# Patient Record
Sex: FEMALE | Race: WHITE | URBAN - METROPOLITAN AREA
[De-identification: names, ages, dates, MRNs, and addresses within clinical notes are randomized per-mention and may not be internally consistent; named-entity substitution may affect disease eponyms.]

---

## 2019-11-14 ENCOUNTER — EMERGENCY (EMERGENCY)
Facility: HOSPITAL | Age: 24
LOS: 0 days | Discharge: HOME | End: 2019-11-14
Attending: EMERGENCY MEDICINE | Admitting: EMERGENCY MEDICINE
Payer: COMMERCIAL

## 2019-11-14 VITALS
DIASTOLIC BLOOD PRESSURE: 57 MMHG | RESPIRATION RATE: 19 BRPM | HEART RATE: 87 BPM | OXYGEN SATURATION: 98 % | TEMPERATURE: 97 F | SYSTOLIC BLOOD PRESSURE: 121 MMHG

## 2019-11-14 DIAGNOSIS — R55 SYNCOPE AND COLLAPSE: ICD-10-CM

## 2019-11-14 DIAGNOSIS — R06.02 SHORTNESS OF BREATH: ICD-10-CM

## 2019-11-14 DIAGNOSIS — N93.9 ABNORMAL UTERINE AND VAGINAL BLEEDING, UNSPECIFIED: ICD-10-CM

## 2019-11-14 LAB
ALBUMIN SERPL ELPH-MCNC: 5 G/DL — SIGNIFICANT CHANGE UP (ref 3.5–5.2)
ALP SERPL-CCNC: 53 U/L — SIGNIFICANT CHANGE UP (ref 30–115)
ALT FLD-CCNC: 25 U/L — SIGNIFICANT CHANGE UP (ref 0–41)
ANION GAP SERPL CALC-SCNC: 16 MMOL/L — HIGH (ref 7–14)
APPEARANCE UR: CLEAR — SIGNIFICANT CHANGE UP
AST SERPL-CCNC: 27 U/L — SIGNIFICANT CHANGE UP (ref 0–41)
BACTERIA # UR AUTO: NEGATIVE — SIGNIFICANT CHANGE UP
BASOPHILS # BLD AUTO: 0.05 K/UL — SIGNIFICANT CHANGE UP (ref 0–0.2)
BASOPHILS NFR BLD AUTO: 0.5 % — SIGNIFICANT CHANGE UP (ref 0–1)
BILIRUB SERPL-MCNC: 0.7 MG/DL — SIGNIFICANT CHANGE UP (ref 0.2–1.2)
BILIRUB UR-MCNC: NEGATIVE — SIGNIFICANT CHANGE UP
BUN SERPL-MCNC: 11 MG/DL — SIGNIFICANT CHANGE UP (ref 10–20)
CALCIUM SERPL-MCNC: 9.9 MG/DL — SIGNIFICANT CHANGE UP (ref 8.5–10.1)
CHLORIDE SERPL-SCNC: 106 MMOL/L — SIGNIFICANT CHANGE UP (ref 98–110)
CO2 SERPL-SCNC: 22 MMOL/L — SIGNIFICANT CHANGE UP (ref 17–32)
COLOR SPEC: SIGNIFICANT CHANGE UP
CREAT SERPL-MCNC: 0.9 MG/DL — SIGNIFICANT CHANGE UP (ref 0.7–1.5)
DIFF PNL FLD: ABNORMAL
EOSINOPHIL # BLD AUTO: 0.04 K/UL — SIGNIFICANT CHANGE UP (ref 0–0.7)
EOSINOPHIL NFR BLD AUTO: 0.4 % — SIGNIFICANT CHANGE UP (ref 0–8)
EPI CELLS # UR: 3 /HPF — SIGNIFICANT CHANGE UP (ref 0–5)
GLUCOSE SERPL-MCNC: 92 MG/DL — SIGNIFICANT CHANGE UP (ref 70–99)
GLUCOSE UR QL: NEGATIVE — SIGNIFICANT CHANGE UP
HCT VFR BLD CALC: 42.3 % — SIGNIFICANT CHANGE UP (ref 37–47)
HGB BLD-MCNC: 14 G/DL — SIGNIFICANT CHANGE UP (ref 12–16)
HYALINE CASTS # UR AUTO: 1 /LPF — SIGNIFICANT CHANGE UP (ref 0–7)
IMM GRANULOCYTES NFR BLD AUTO: 0.6 % — HIGH (ref 0.1–0.3)
KETONES UR-MCNC: NEGATIVE — SIGNIFICANT CHANGE UP
LEUKOCYTE ESTERASE UR-ACNC: NEGATIVE — SIGNIFICANT CHANGE UP
LYMPHOCYTES # BLD AUTO: 1.41 K/UL — SIGNIFICANT CHANGE UP (ref 1.2–3.4)
LYMPHOCYTES # BLD AUTO: 14.5 % — LOW (ref 20.5–51.1)
MCHC RBC-ENTMCNC: 27.9 PG — SIGNIFICANT CHANGE UP (ref 27–31)
MCHC RBC-ENTMCNC: 33.1 G/DL — SIGNIFICANT CHANGE UP (ref 32–37)
MCV RBC AUTO: 84.4 FL — SIGNIFICANT CHANGE UP (ref 81–99)
MONOCYTES # BLD AUTO: 0.53 K/UL — SIGNIFICANT CHANGE UP (ref 0.1–0.6)
MONOCYTES NFR BLD AUTO: 5.5 % — SIGNIFICANT CHANGE UP (ref 1.7–9.3)
NEUTROPHILS # BLD AUTO: 7.61 K/UL — HIGH (ref 1.4–6.5)
NEUTROPHILS NFR BLD AUTO: 78.5 % — HIGH (ref 42.2–75.2)
NITRITE UR-MCNC: NEGATIVE — SIGNIFICANT CHANGE UP
NRBC # BLD: 0 /100 WBCS — SIGNIFICANT CHANGE UP (ref 0–0)
PH UR: 7.5 — SIGNIFICANT CHANGE UP (ref 5–8)
PLATELET # BLD AUTO: 222 K/UL — SIGNIFICANT CHANGE UP (ref 130–400)
POTASSIUM SERPL-MCNC: 4.3 MMOL/L — SIGNIFICANT CHANGE UP (ref 3.5–5)
POTASSIUM SERPL-SCNC: 4.3 MMOL/L — SIGNIFICANT CHANGE UP (ref 3.5–5)
PROT SERPL-MCNC: 7.6 G/DL — SIGNIFICANT CHANGE UP (ref 6–8)
PROT UR-MCNC: NEGATIVE — SIGNIFICANT CHANGE UP
RBC # BLD: 5.01 M/UL — SIGNIFICANT CHANGE UP (ref 4.2–5.4)
RBC # FLD: 12.8 % — SIGNIFICANT CHANGE UP (ref 11.5–14.5)
RBC CASTS # UR COMP ASSIST: 23 /HPF — HIGH (ref 0–4)
SODIUM SERPL-SCNC: 144 MMOL/L — SIGNIFICANT CHANGE UP (ref 135–146)
SP GR SPEC: 1.01 — LOW (ref 1.01–1.02)
UROBILINOGEN FLD QL: SIGNIFICANT CHANGE UP
WBC # BLD: 9.7 K/UL — SIGNIFICANT CHANGE UP (ref 4.8–10.8)
WBC # FLD AUTO: 9.7 K/UL — SIGNIFICANT CHANGE UP (ref 4.8–10.8)
WBC UR QL: 1 /HPF — SIGNIFICANT CHANGE UP (ref 0–5)

## 2019-11-14 PROCEDURE — 93010 ELECTROCARDIOGRAM REPORT: CPT

## 2019-11-14 PROCEDURE — 99284 EMERGENCY DEPT VISIT MOD MDM: CPT

## 2019-11-14 RX ORDER — IBUPROFEN 200 MG
600 TABLET ORAL ONCE
Refills: 0 | Status: COMPLETED | OUTPATIENT
Start: 2019-11-14 | End: 2019-11-14

## 2019-11-14 RX ADMIN — Medication 600 MILLIGRAM(S): at 19:32

## 2019-11-14 NOTE — ED PROVIDER NOTE - OBJECTIVE STATEMENT
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 PT was working when she began to feel hot and lightheaded, sat down, and reportedly lost consciousness for a few seconds to minutes. PT denies CP, SOB, n/v/d, abd pain, dysuria, hematuria.

## 2019-11-14 NOTE — ED PROVIDER NOTE - ATTENDING CONTRIBUTION TO CARE
24yoF previously healthy, smoker, presents with near syncope. States was at work and felt lightheaded, hot, and sat down and may have passed out. Had some SOB during that but none since. Is on her period but not bleeding heavily and is her usual. Has her usual menstrual cramps as well. Denies CP, leg pain or swelling, OCP use, vomiting, hematochezia, or any other symptoms. Had only coffee today. On exam, afebrile, hemodynamically stable, saturating well, NAD, well appearing, head NCAT, EOMI grossly, anicteric, MMM, no JVD, RRR, nml S1/S2, no m/r/g, lungs CTAB, no w/r/r, abd soft, NT, ND, nml BS, no rebound or guarding, AAO, CN's 3-12 grossly intact, NICHOLE spontaneously, no leg cyanosis or edema, skin warm, well perfused, no rashes or hives. Character low suspicion for PE and no PERC negative. No e/o arrhythmia. No e/o aortic outflow obstruction. No anemia and no large bleeding. No free fluid on bedside u/s to suggest ruptured cyst or other process. She has been dehydrated today. Given fluids. 24yoF previously healthy, smoker, presents with near syncope. States was at work and felt lightheaded, hot, and sat down and may have passed out. Had some SOB during that but none since. Is on her period but not bleeding heavily and is her usual. Has her usual menstrual cramps as well. Denies CP, leg pain or swelling, OCP use, vomiting, hematochezia, or any other symptoms. Had only coffee today. On exam, afebrile, hemodynamically stable, saturating well, NAD, well appearing, head NCAT, EOMI grossly, anicteric, MMM, no JVD, RRR, nml S1/S2, no m/r/g, lungs CTAB, no w/r/r, abd soft, NT, ND, nml BS, no rebound or guarding, AAO, CN's 3-12 grossly intact, NICHOLE spontaneously, no leg cyanosis or edema, skin warm, well perfused, no rashes or hives. Character low suspicion for PE and no PERC negative. No e/o arrhythmia. No e/o aortic outflow obstruction. No anemia and no large bleeding. No free fluid on bedside u/s to suggest ruptured cyst or other process. She has been dehydrated today. Given fluids. Patient is well appearing, NAD, afebrile, hemodynamically stable. Asymptomatic. Discharged with instructions in further symptomatic care, return precautions, and need for PMD f/u.

## 2019-11-14 NOTE — ED PROVIDER NOTE - CLINICAL SUMMARY MEDICAL DECISION MAKING FREE TEXT BOX
24yoF previously healthy, smoker, presents with near syncope. States was at work and felt lightheaded, hot, and sat down and may have passed out. Had some SOB during that but none since. Is on her period but not bleeding heavily and is her usual. Has her usual menstrual cramps as well. Denies CP, leg pain or swelling, OCP use, vomiting, hematochezia, or any other symptoms. Had only coffee today. On exam, afebrile, hemodynamically stable, saturating well, NAD, well appearing, head NCAT, EOMI grossly, anicteric, MMM, no JVD, RRR, nml S1/S2, no m/r/g, lungs CTAB, no w/r/r, abd soft, NT, ND, nml BS, no rebound or guarding, AAO, CN's 3-12 grossly intact, NICHOLE spontaneously, no leg cyanosis or edema, skin warm, well perfused, no rashes or hives. Character low suspicion for PE and no PERC negative. No e/o arrhythmia. No e/o aortic outflow obstruction. No anemia and no large bleeding. No free fluid on bedside u/s to suggest ruptured cyst or other process. She has been dehydrated today. Given fluids. Patient is well appearing, NAD, afebrile, hemodynamically stable. Asymptomatic. Discharged with instructions in further symptomatic care, return precautions, and need for PMD f/u.

## 2019-11-14 NOTE — ED ADULT NURSE NOTE - NSIMPLEMENTINTERV_GEN_ALL_ED
Implemented All Universal Safety Interventions:  [place] to call system. Call bell, personal items and telephone within reach. Instruct patient to call for assistance. Room bathroom lighting operational. Non-slip footwear when patient is off stretcher. Physically safe environment: no spills, clutter or unnecessary equipment. Stretcher in lowest position, wheels locked, appropriate side rails in place.

## 2019-11-14 NOTE — ED PROVIDER NOTE - PATIENT PORTAL LINK FT
You can access the FollowMyHealth Patient Portal offered by [facility] by registering at the following website: [email or web address]. By joining [employer]’s FollowMyHealth portal, you will also be able to view your health information using other applications (apps) compatible with our system.

## 2019-11-14 NOTE — ED PROVIDER NOTE - NS ED ROS FT
Constitutional: (-) fever (-) vomiting  Eyes/ENT: (-) eye discharge, (-) runny nose  Cardiovascular: (-) chest pain, (-) syncope  Respiratory: (-) cough, (-) shortness of breath  Gastrointestinal: (-) vomiting, (-) diarrhea, (-) abdominal pain  : (-) dysuria   Musculoskeletal: (-) neck pain, (-) back pain, (-) joint pain  Integumentary: (-) rash, (-) edema  Neurological: (-) headache, (+) loc  Allergic/Immunologic: (-) pruritus  Endocrine: No history of thyroid disease or diabetes.

## 2019-11-14 NOTE — ED ADULT TRIAGE NOTE - CHIEF COMPLAINT QUOTE
pt c.o abdominal pain x 2 days. associated symptoms include vaginal bleeding due to her menstrual cycle. pt stated her flow is more than normal the past 24hrs

## 2019-11-14 NOTE — ED ADULT NURSE NOTE - OBJECTIVE STATEMENT
Pt a&ox3, pt states she "blacked out" while at work, pt states she sat in a chair because she felt dizzy  in one room wand woke up not knowing what was going on. Pt c/o menstrual pain which began today. Pt bleeding heavily, day 1 of menstrual cycle. Pt denies n/v/d, Cp/ sob, palpitations, change in vision, numbness or tingling.

## 2020-09-17 ENCOUNTER — APPOINTMENT (OUTPATIENT)
Dept: OBGYN | Facility: CLINIC | Age: 25
End: 2020-09-17

## 2020-09-18 ENCOUNTER — APPOINTMENT (OUTPATIENT)
Dept: OBGYN | Facility: CLINIC | Age: 25
End: 2020-09-18
Payer: COMMERCIAL

## 2020-09-18 PROCEDURE — 81025 URINE PREGNANCY TEST: CPT

## 2020-09-18 PROCEDURE — 99385 PREV VISIT NEW AGE 18-39: CPT

## 2020-09-18 PROCEDURE — 81002 URINALYSIS NONAUTO W/O SCOPE: CPT

## 2020-10-23 ENCOUNTER — APPOINTMENT (OUTPATIENT)
Dept: OBGYN | Facility: CLINIC | Age: 25
End: 2020-10-23
Payer: COMMERCIAL

## 2020-10-23 PROCEDURE — 0502F SUBSEQUENT PRENATAL CARE: CPT

## 2020-11-06 ENCOUNTER — APPOINTMENT (OUTPATIENT)
Dept: OBGYN | Facility: CLINIC | Age: 25
End: 2020-11-06
Payer: COMMERCIAL

## 2020-11-06 PROCEDURE — 0502F SUBSEQUENT PRENATAL CARE: CPT

## 2020-12-01 PROBLEM — Z00.00 ENCOUNTER FOR PREVENTIVE HEALTH EXAMINATION: Status: ACTIVE | Noted: 2020-12-01

## 2020-12-04 ENCOUNTER — APPOINTMENT (OUTPATIENT)
Dept: OBGYN | Facility: CLINIC | Age: 25
End: 2020-12-04
Payer: COMMERCIAL

## 2020-12-04 PROCEDURE — 0502F SUBSEQUENT PRENATAL CARE: CPT

## 2020-12-22 ENCOUNTER — APPOINTMENT (OUTPATIENT)
Dept: OBGYN | Facility: CLINIC | Age: 25
End: 2020-12-22
Payer: COMMERCIAL

## 2020-12-22 PROCEDURE — 0502F SUBSEQUENT PRENATAL CARE: CPT

## 2021-01-06 ENCOUNTER — NON-APPOINTMENT (OUTPATIENT)
Age: 26
End: 2021-01-06

## 2021-01-06 DIAGNOSIS — Z34.00 ENCOUNTER FOR SUPERVISION OF NORMAL FIRST PREGNANCY, UNSPECIFIED TRIMESTER: ICD-10-CM

## 2021-01-06 DIAGNOSIS — F17.200 NICOTINE DEPENDENCE, UNSPECIFIED, UNCOMPLICATED: ICD-10-CM

## 2021-01-06 LAB — CYTOLOGY CVX/VAG DOC THIN PREP: NORMAL

## 2021-01-06 RX ORDER — PNV/FERROUS SULFATE/FOLIC ACID 27-<0.5MG
TABLET ORAL
Refills: 0 | Status: ACTIVE | COMMUNITY

## 2021-01-14 ENCOUNTER — APPOINTMENT (OUTPATIENT)
Dept: OBGYN | Facility: CLINIC | Age: 26
End: 2021-01-14
Payer: COMMERCIAL

## 2021-01-14 PROCEDURE — 0502F SUBSEQUENT PRENATAL CARE: CPT

## 2021-01-21 ENCOUNTER — APPOINTMENT (OUTPATIENT)
Dept: OBGYN | Facility: CLINIC | Age: 26
End: 2021-01-21
Payer: COMMERCIAL

## 2021-01-21 PROCEDURE — 0502F SUBSEQUENT PRENATAL CARE: CPT

## 2021-02-11 ENCOUNTER — APPOINTMENT (OUTPATIENT)
Dept: OBGYN | Facility: CLINIC | Age: 26
End: 2021-02-11
Payer: COMMERCIAL

## 2021-02-11 PROCEDURE — 0502F SUBSEQUENT PRENATAL CARE: CPT

## 2021-03-01 ENCOUNTER — LABORATORY RESULT (OUTPATIENT)
Age: 26
End: 2021-03-01

## 2021-03-04 ENCOUNTER — APPOINTMENT (OUTPATIENT)
Dept: OBGYN | Facility: CLINIC | Age: 26
End: 2021-03-04
Payer: COMMERCIAL

## 2021-03-04 PROCEDURE — 0502F SUBSEQUENT PRENATAL CARE: CPT

## 2021-03-19 ENCOUNTER — APPOINTMENT (OUTPATIENT)
Dept: OBGYN | Facility: CLINIC | Age: 26
End: 2021-03-19
Payer: COMMERCIAL

## 2021-03-19 PROCEDURE — 0502F SUBSEQUENT PRENATAL CARE: CPT

## 2021-04-06 ENCOUNTER — APPOINTMENT (OUTPATIENT)
Dept: OBGYN | Facility: CLINIC | Age: 26
End: 2021-04-06
Payer: COMMERCIAL

## 2021-04-06 PROCEDURE — 0502F SUBSEQUENT PRENATAL CARE: CPT

## 2021-04-20 ENCOUNTER — APPOINTMENT (OUTPATIENT)
Dept: OBGYN | Facility: CLINIC | Age: 26
End: 2021-04-20

## 2021-05-01 ENCOUNTER — APPOINTMENT (OUTPATIENT)
Dept: OBGYN | Facility: CLINIC | Age: 26
End: 2021-05-01
Payer: COMMERCIAL

## 2021-05-01 PROCEDURE — 0502F SUBSEQUENT PRENATAL CARE: CPT

## 2021-05-07 ENCOUNTER — LABORATORY RESULT (OUTPATIENT)
Age: 26
End: 2021-05-07

## 2021-05-15 ENCOUNTER — APPOINTMENT (OUTPATIENT)
Dept: OBGYN | Facility: CLINIC | Age: 26
End: 2021-05-15
Payer: COMMERCIAL

## 2021-05-15 ENCOUNTER — LABORATORY RESULT (OUTPATIENT)
Age: 26
End: 2021-05-15

## 2021-05-15 PROCEDURE — 0502F SUBSEQUENT PRENATAL CARE: CPT

## 2021-05-19 ENCOUNTER — APPOINTMENT (OUTPATIENT)
Dept: OBGYN | Facility: CLINIC | Age: 26
End: 2021-05-19
Payer: COMMERCIAL

## 2021-05-19 PROCEDURE — 0502F SUBSEQUENT PRENATAL CARE: CPT

## 2021-05-19 PROCEDURE — 59025 FETAL NON-STRESS TEST: CPT

## 2021-05-26 ENCOUNTER — APPOINTMENT (OUTPATIENT)
Dept: OBGYN | Facility: CLINIC | Age: 26
End: 2021-05-26

## 2021-05-26 ENCOUNTER — APPOINTMENT (OUTPATIENT)
Dept: OBGYN | Facility: CLINIC | Age: 26
End: 2021-05-26
Payer: COMMERCIAL

## 2021-05-26 ENCOUNTER — NON-APPOINTMENT (OUTPATIENT)
Age: 26
End: 2021-05-26

## 2021-05-26 PROCEDURE — 59025 FETAL NON-STRESS TEST: CPT

## 2021-05-26 PROCEDURE — 0502F SUBSEQUENT PRENATAL CARE: CPT

## 2021-06-01 ENCOUNTER — APPOINTMENT (OUTPATIENT)
Dept: OBGYN | Facility: CLINIC | Age: 26
End: 2021-06-01
Payer: COMMERCIAL

## 2021-06-01 PROCEDURE — 0502F SUBSEQUENT PRENATAL CARE: CPT

## 2021-06-01 PROCEDURE — 59025 FETAL NON-STRESS TEST: CPT

## 2021-12-21 ENCOUNTER — LABORATORY RESULT (OUTPATIENT)
Age: 26
End: 2021-12-21

## 2021-12-22 ENCOUNTER — LABORATORY RESULT (OUTPATIENT)
Age: 26
End: 2021-12-22

## 2021-12-22 ENCOUNTER — APPOINTMENT (OUTPATIENT)
Dept: OBGYN | Facility: CLINIC | Age: 26
End: 2021-12-22
Payer: MEDICAID

## 2021-12-22 VITALS
BODY MASS INDEX: 26.67 KG/M2 | HEART RATE: 97 BPM | TEMPERATURE: 98.6 F | WEIGHT: 176 LBS | DIASTOLIC BLOOD PRESSURE: 62 MMHG | OXYGEN SATURATION: 98 % | SYSTOLIC BLOOD PRESSURE: 102 MMHG | HEIGHT: 68 IN

## 2021-12-22 DIAGNOSIS — Z86.19 PERSONAL HISTORY OF OTHER INFECTIOUS AND PARASITIC DISEASES: ICD-10-CM

## 2021-12-22 DIAGNOSIS — R87.612 LOW GRADE SQUAMOUS INTRAEPITHELIAL LESION ON CYTOLOGIC SMEAR OF CERVIX (LGSIL): ICD-10-CM

## 2021-12-22 DIAGNOSIS — Z01.419 ENCOUNTER FOR GYNECOLOGICAL EXAMINATION (GENERAL) (ROUTINE) W/OUT ABNORMAL FINDINGS: ICD-10-CM

## 2021-12-22 LAB
BILIRUB UR QL STRIP: NORMAL
GLUCOSE UR-MCNC: NORMAL
HCG UR QL: POSITIVE
HGB UR QL STRIP.AUTO: NORMAL
KETONES UR-MCNC: NORMAL
LEUKOCYTE ESTERASE UR QL STRIP: NORMAL
NITRITE UR QL STRIP: NORMAL
PROT UR STRIP-MCNC: NORMAL
QUALITY CONTROL: YES

## 2021-12-22 PROCEDURE — 81003 URINALYSIS AUTO W/O SCOPE: CPT | Mod: QW

## 2021-12-22 PROCEDURE — 81025 URINE PREGNANCY TEST: CPT

## 2021-12-22 PROCEDURE — 99395 PREV VISIT EST AGE 18-39: CPT

## 2021-12-22 NOTE — HISTORY OF PRESENT ILLNESS
[Patient reported PAP Smear was abnormal] : Patient reported PAP Smear was abnormal [Gonorrhea test offered] : Gonorrhea test offered [Chlamydia test offered] : Chlamydia test offered [Trichomonas test offered] : Trichomonas test offered [HPV test offered] : HPV test offered [Y] : Positive pregnancy history [FreeTextEntry1] : pt present for new pregnancy  [TextBox_4] : annual/amenorrhea [Mammogramdate] : 0 [PapSmeardate] : 9/18/2020 [TextBox_31] : lgsil [LMPDate] : 11/7/2021 [PGxTotal] : 1 [[facility]] : 1

## 2021-12-22 NOTE — PLAN
[FreeTextEntry1] : annual with amenorrhea \par tvs- + early IUP at 6.3 weeks with haley 8/14/22 \par pnv qd/c/d/ e/ folic acid \par prenatal w/u and then repeat pn labs #2\par increase po fluids \par pregnancy counseling \par rto 3 weeks /prn

## 2021-12-22 NOTE — PROCEDURE
[Cervical Pap Smear] : cervical Pap smear [Liquid Base] : liquid base [GC & Chlamydia via Pap] : GC & Chlamydia via Pap [Tolerated Well] : the patient tolerated the procedure well [No Complications] : there were no complications [de-identified] : HPV

## 2022-01-12 ENCOUNTER — NON-APPOINTMENT (OUTPATIENT)
Age: 27
End: 2022-01-12

## 2022-01-13 ENCOUNTER — NON-APPOINTMENT (OUTPATIENT)
Age: 27
End: 2022-01-13

## 2022-01-13 ENCOUNTER — APPOINTMENT (OUTPATIENT)
Dept: OBGYN | Facility: CLINIC | Age: 27
End: 2022-01-13
Payer: MEDICAID

## 2022-01-13 VITALS
DIASTOLIC BLOOD PRESSURE: 60 MMHG | TEMPERATURE: 98 F | BODY MASS INDEX: 26.67 KG/M2 | HEART RATE: 88 BPM | SYSTOLIC BLOOD PRESSURE: 110 MMHG | HEIGHT: 68 IN | OXYGEN SATURATION: 99 % | WEIGHT: 176 LBS

## 2022-01-13 PROCEDURE — 0502F SUBSEQUENT PRENATAL CARE: CPT

## 2022-01-18 ENCOUNTER — APPOINTMENT (OUTPATIENT)
Dept: OBGYN | Facility: CLINIC | Age: 27
End: 2022-01-18
Payer: MEDICAID

## 2022-01-18 VITALS
DIASTOLIC BLOOD PRESSURE: 70 MMHG | HEART RATE: 88 BPM | OXYGEN SATURATION: 99 % | SYSTOLIC BLOOD PRESSURE: 110 MMHG | TEMPERATURE: 97.9 F | BODY MASS INDEX: 26.98 KG/M2 | HEIGHT: 68 IN | WEIGHT: 178 LBS

## 2022-01-18 PROCEDURE — 0502F SUBSEQUENT PRENATAL CARE: CPT

## 2022-02-03 ENCOUNTER — APPOINTMENT (OUTPATIENT)
Dept: OBGYN | Facility: CLINIC | Age: 27
End: 2022-02-03
Payer: MEDICAID

## 2022-02-03 VITALS
SYSTOLIC BLOOD PRESSURE: 110 MMHG | HEART RATE: 92 BPM | HEIGHT: 68 IN | OXYGEN SATURATION: 100 % | DIASTOLIC BLOOD PRESSURE: 70 MMHG | WEIGHT: 177 LBS | TEMPERATURE: 98 F | BODY MASS INDEX: 26.83 KG/M2

## 2022-02-03 PROCEDURE — 0502F SUBSEQUENT PRENATAL CARE: CPT

## 2022-02-17 ENCOUNTER — NON-APPOINTMENT (OUTPATIENT)
Age: 27
End: 2022-02-17

## 2022-02-17 ENCOUNTER — APPOINTMENT (OUTPATIENT)
Dept: OBGYN | Facility: CLINIC | Age: 27
End: 2022-02-17
Payer: MEDICAID

## 2022-02-17 VITALS
SYSTOLIC BLOOD PRESSURE: 118 MMHG | TEMPERATURE: 97.7 F | WEIGHT: 179 LBS | HEIGHT: 68 IN | HEART RATE: 90 BPM | DIASTOLIC BLOOD PRESSURE: 71 MMHG | OXYGEN SATURATION: 99 % | BODY MASS INDEX: 27.13 KG/M2

## 2022-02-17 PROCEDURE — 0502F SUBSEQUENT PRENATAL CARE: CPT

## 2022-02-25 ENCOUNTER — APPOINTMENT (OUTPATIENT)
Dept: OBGYN | Facility: CLINIC | Age: 27
End: 2022-02-25
Payer: MEDICAID

## 2022-02-25 VITALS
OXYGEN SATURATION: 98 % | BODY MASS INDEX: 26.67 KG/M2 | DIASTOLIC BLOOD PRESSURE: 80 MMHG | HEIGHT: 68 IN | HEART RATE: 110 BPM | WEIGHT: 176 LBS | SYSTOLIC BLOOD PRESSURE: 120 MMHG | TEMPERATURE: 98 F

## 2022-02-25 PROCEDURE — 59425 ANTEPARTUM CARE ONLY: CPT

## 2022-02-25 PROCEDURE — 0502F SUBSEQUENT PRENATAL CARE: CPT

## 2022-03-03 ENCOUNTER — APPOINTMENT (OUTPATIENT)
Dept: OBGYN | Facility: CLINIC | Age: 27
End: 2022-03-03
Payer: MEDICAID

## 2022-03-03 VITALS
DIASTOLIC BLOOD PRESSURE: 70 MMHG | HEIGHT: 68 IN | WEIGHT: 171 LBS | TEMPERATURE: 97 F | OXYGEN SATURATION: 98 % | SYSTOLIC BLOOD PRESSURE: 110 MMHG | BODY MASS INDEX: 25.91 KG/M2 | HEART RATE: 85 BPM

## 2022-03-03 DIAGNOSIS — Z87.59 PERSONAL HISTORY OF OTHER COMPLICATIONS OF PREGNANCY, CHILDBIRTH AND THE PUERPERIUM: ICD-10-CM

## 2022-03-03 PROCEDURE — 99213 OFFICE O/P EST LOW 20 MIN: CPT

## 2022-03-03 PROCEDURE — 81003 URINALYSIS AUTO W/O SCOPE: CPT | Mod: QW

## 2022-03-03 NOTE — HISTORY OF PRESENT ILLNESS
[Patient reported mammogram was normal] : Patient reported mammogram was normal [Patient reported PAP Smear was abnormal] : Patient reported PAP Smear was abnormal [Y] : Positive pregnancy history [FreeTextEntry1] : PT PRESENT FOR FOLLOW UP MAB [TextBox_4] : pt s/p SAB 2/25/22 at 15/5 weeks gestation  [Mammogramdate] : 0 [PapSmeardate] : 9/18/2020 [TextBox_31] : LSIL [LMPDate] : 11/7/2021 [PGHxTotal] : 2 [[facility] Center[facility]ulerm] : 1 [[facility] Hospitaliving] : 1 [PGHxABSpont] : 1

## 2022-03-03 NOTE — PLAN
[FreeTextEntry1] : S/P sab at 15.5 weeks on 2/25/22 \par pt currently denies pain, no dysuria, no s/s of infection \par pt has light bleeding \par pt will have labs bhcg/and repeat if necessary \par pt also to f/u 2 weeks for TVS \par report any s/s of infection/any problems \par d/w/p pregnancy/loss and future pregnancy \par pt coping well and has good family support \par pt advised to cotninue PNV qd/c/d/e//folic acid \par matinain healthy diet/lifestyle \par avoid smoking \par increaes po fluids \par rto 2 weeks/prn \par

## 2022-03-09 ENCOUNTER — LABORATORY RESULT (OUTPATIENT)
Age: 27
End: 2022-03-09

## 2022-03-17 ENCOUNTER — APPOINTMENT (OUTPATIENT)
Dept: OBGYN | Facility: CLINIC | Age: 27
End: 2022-03-17
Payer: MEDICAID

## 2022-03-17 ENCOUNTER — LABORATORY RESULT (OUTPATIENT)
Age: 27
End: 2022-03-17

## 2022-03-17 VITALS
BODY MASS INDEX: 25.76 KG/M2 | HEIGHT: 68 IN | OXYGEN SATURATION: 97 % | HEART RATE: 97 BPM | SYSTOLIC BLOOD PRESSURE: 100 MMHG | TEMPERATURE: 98.2 F | WEIGHT: 170 LBS | DIASTOLIC BLOOD PRESSURE: 70 MMHG

## 2022-03-17 LAB
BILIRUB UR QL STRIP: NORMAL
CLARITY UR: CLEAR
COLLECTION METHOD: NORMAL
GLUCOSE UR-MCNC: NEGATIVE
HCG UR QL: 0.2 EU/DL
HGB UR QL STRIP.AUTO: NORMAL
KETONES UR-MCNC: NEGATIVE
LEUKOCYTE ESTERASE UR QL STRIP: NORMAL
NITRITE UR QL STRIP: NEGATIVE
PH UR STRIP: 6
PROT UR STRIP-MCNC: NORMAL
SP GR UR STRIP: 1.03

## 2022-03-17 PROCEDURE — 81003 URINALYSIS AUTO W/O SCOPE: CPT | Mod: QW

## 2022-03-17 PROCEDURE — 99213 OFFICE O/P EST LOW 20 MIN: CPT

## 2022-03-17 NOTE — PLAN
[FreeTextEntry1] : pt s/p MAB 2/22/22 with last beta on 3/9/22  66.6 \par TVS today reveals vascularity to endometrium \par pt reports continues with some bleeding/light staining\par pt denies pain, no temp, \par pt advised f/u beta \par will discuss with dr. shen as well \par report any s/s of pain, heavy bleeding/temp \par rto 1 week for f/u sono

## 2022-03-17 NOTE — HISTORY OF PRESENT ILLNESS
[Ultrasound] : ultrasound [Yes] : pregnancy [TextBox_4] : pt for f/u to MAB @ 15+ weeks \par no c/o pain, light spotting , no dysuria  [Mammogramdate] : -0- [BreastSonogramDate] : -0- [PapSmeardate] : 12/22/21 [BoneDensityDate] : -0- [ColonoscopyDate] : -0- [LMPDate] : 11/7/21 [PGHxTotal] : 2 [[facility] CenterxFulerm] : 1 [[facility]iving] : 1 [PGHxABSpont] : 1 [TextBox_27] : TVS/ PELVIC US- 3/17/22 [TextBox_6] : 11/7/21 [TextBox_9] : 13 [TextBox_13] : 30 [TextBox_15] : 5 [FreeTextEntry1] : 11/7/21

## 2022-03-24 ENCOUNTER — APPOINTMENT (OUTPATIENT)
Dept: OBGYN | Facility: CLINIC | Age: 27
End: 2022-03-24
Payer: MEDICAID

## 2022-03-24 VITALS
SYSTOLIC BLOOD PRESSURE: 100 MMHG | TEMPERATURE: 96.7 F | HEIGHT: 68 IN | HEART RATE: 100 BPM | DIASTOLIC BLOOD PRESSURE: 60 MMHG | BODY MASS INDEX: 25.76 KG/M2 | OXYGEN SATURATION: 99 % | WEIGHT: 170 LBS

## 2022-03-24 PROCEDURE — 99213 OFFICE O/P EST LOW 20 MIN: CPT

## 2022-03-24 NOTE — REVIEW OF SYSTEMS
[Abn Vaginal bleeding] : abnormal vaginal bleeding [Negative] : Heme/Lymph [FreeTextEntry8] : pt still with light bleeding has turned a brighter red with mucus but not heavy

## 2022-03-24 NOTE — HISTORY OF PRESENT ILLNESS
[Ultrasound] : ultrasound [FreeTextEntry1] : PATIENT PRESENT FOR FOLLOW UP VISIT/ MAB 2/25//22. [Mammogramdate] : -0- [BreastSonogramDate] : -0- [PapSmeardate] : 12/22/21 [BoneDensityDate] : -0- [ColonoscopyDate] : -0- [LMPDate] : 11/7/21 [PGHxTotal] : 2 [[facility] Centeriving] : 1 [PGHxABSpont] : 1 [TextBox_27] : TVS/ PELVIC US-

## 2022-03-24 NOTE — PLAN
[FreeTextEntry1] : pt with s/p sab on 2/25/22 \par no pain, no temp, light vaginal bleeding now a brighter red with mucus but not heavy \par had pain x 1 day tuesday but resolved \par beta decreased from 66.6 to 20.8 \par tvs- irregular endometrium\par will repeat beta/monitor symptoms \par will consult with covering doctor \par rto 1 week/prn

## 2022-03-30 ENCOUNTER — LABORATORY RESULT (OUTPATIENT)
Age: 27
End: 2022-03-30

## 2022-03-31 ENCOUNTER — APPOINTMENT (OUTPATIENT)
Dept: OBGYN | Facility: CLINIC | Age: 27
End: 2022-03-31
Payer: MEDICAID

## 2022-03-31 VITALS
SYSTOLIC BLOOD PRESSURE: 110 MMHG | OXYGEN SATURATION: 99 % | DIASTOLIC BLOOD PRESSURE: 70 MMHG | BODY MASS INDEX: 25.76 KG/M2 | HEIGHT: 68 IN | HEART RATE: 78 BPM | TEMPERATURE: 98 F | WEIGHT: 170 LBS

## 2022-03-31 DIAGNOSIS — O03.4 INCOMPLETE SPONTANEOUS ABORTION W/OUT COMPLICATION: ICD-10-CM

## 2022-03-31 PROCEDURE — 81003 URINALYSIS AUTO W/O SCOPE: CPT | Mod: QW

## 2022-03-31 PROCEDURE — 99213 OFFICE O/P EST LOW 20 MIN: CPT

## 2022-03-31 NOTE — REVIEW OF SYSTEMS
[Abn Vaginal bleeding] : abnormal vaginal bleeding [Negative] : Heme/Lymph [FreeTextEntry8] : started menses 3/24/22 and states still bleeding-heavy

## 2022-03-31 NOTE — PLAN
[FreeTextEntry1] : pt s/p incomplete ab \par bhcg-1.7 as of 3/30/22 \par TVS-still with irregular endometrium r/o retained POC \par pt has been bleeding since she feels she started a menstrual cycle on 3/24/22 \par and states its heavy \par deneis pain,denies temp \par pt requests a D & C \par pt has been d/w Dr. Enamorado-office notified   \par pt will be scheduled for D & C \par and f/u in this office 2 weeks after procedure/prn

## 2022-03-31 NOTE — HISTORY OF PRESENT ILLNESS
[Patient reported PAP Smear was normal] : Patient reported PAP Smear was normal [Gonorrhea test offered] : Gonorrhea test offered [Chlamydia test offered] : Chlamydia test offered [Trichomonas test offered] : Trichomonas test offered [HPV test offered] : HPV test offered [Ultrasound] : ultrasound [FreeTextEntry1] : PT PRESENT FOLLOW UP MAB [TextBox_4] : pt s/p MAB on 2/25/22 \par have been following with beta and sono \par pt reports started menses 3/24/22 and still bleeding \par  [PapSmeardate] : 12/21 [GonorrheaDate] : 12/21 [ChlamydiaDate] : 12/21 [TrichomonasDate] : 12/21 [HPVDate] : 12/21 [TextBox_27] : 03/31/22 TOTAL SONO TVS

## 2022-04-01 ENCOUNTER — APPOINTMENT (OUTPATIENT)
Dept: OBGYN | Facility: CLINIC | Age: 27
End: 2022-04-01
Payer: MEDICAID

## 2022-04-01 ENCOUNTER — OUTPATIENT (OUTPATIENT)
Dept: OUTPATIENT SERVICES | Facility: HOSPITAL | Age: 27
LOS: 1 days | Discharge: HOME | End: 2022-04-01

## 2022-04-01 VITALS
DIASTOLIC BLOOD PRESSURE: 66 MMHG | SYSTOLIC BLOOD PRESSURE: 110 MMHG | BODY MASS INDEX: 25.16 KG/M2 | HEIGHT: 68 IN | WEIGHT: 166 LBS

## 2022-04-01 DIAGNOSIS — O03.9 COMPLETE OR UNSPECIFIED SPONTANEOUS ABORTION W/OUT COMPLICATION: ICD-10-CM

## 2022-04-01 PROCEDURE — 99214 OFFICE O/P EST MOD 30 MIN: CPT | Mod: 25

## 2022-04-01 PROCEDURE — 76817 TRANSVAGINAL US OBSTETRIC: CPT | Mod: 26

## 2022-04-01 NOTE — COUNSELING
[Contraception/ Emergency Contraception/ Safe Sexual Practices] : contraception, emergency contraception, safe sexual practices [Pre/Post Op Instructions] : pre/post op instructions

## 2022-04-01 NOTE — HISTORY OF PRESENT ILLNESS
[FreeTextEntry1] : 26yo P1 s/p SAB in February here with continued bleeding since that time. Now using 3-4 pads per day. Pt was trending HCG level which is now undetectable, however she continues to bleed. CBC  was sent and HGB was 14. Pt frustrated that she has been continuously bleeding for this long. Pt was given cytotec when she went to ER in Feb to pass the pregnancy, wanted a D&C at that time.

## 2022-04-01 NOTE — DISCUSSION/SUMMARY
[FreeTextEntry1] : 28yo s/p SAB with continued bleeding, negative HCG\par -discusssed conservative and surgical options for management, discussed that HGB is 14 so bleeding is not dangerous, can be observed, can try another dose of cytotec, or can try a course of birth control. Pt refusing any hormonal treatment and wants a D&C so bleeding will end sooner.\par -reviewed risks of D&C include bleeding, infection, damage to uterus\par -contraception discussed, pt does not want birth control, she was not trying to prevent pregnancy, has used pull out method, and would be ok if she got pregnant soon.\par -schedule for next week wed.

## 2022-04-03 ENCOUNTER — TRANSCRIPTION ENCOUNTER (OUTPATIENT)
Age: 27
End: 2022-04-03

## 2022-04-04 ENCOUNTER — LABORATORY RESULT (OUTPATIENT)
Age: 27
End: 2022-04-04

## 2022-04-06 ENCOUNTER — RESULT REVIEW (OUTPATIENT)
Age: 27
End: 2022-04-06

## 2022-04-06 ENCOUNTER — OUTPATIENT (OUTPATIENT)
Dept: OUTPATIENT SERVICES | Facility: HOSPITAL | Age: 27
LOS: 1 days | Discharge: HOME | End: 2022-04-06
Payer: MEDICAID

## 2022-04-06 ENCOUNTER — TRANSCRIPTION ENCOUNTER (OUTPATIENT)
Age: 27
End: 2022-04-06

## 2022-04-06 VITALS
HEART RATE: 60 BPM | SYSTOLIC BLOOD PRESSURE: 101 MMHG | RESPIRATION RATE: 20 BRPM | OXYGEN SATURATION: 100 % | TEMPERATURE: 98 F | DIASTOLIC BLOOD PRESSURE: 65 MMHG

## 2022-04-06 VITALS
HEART RATE: 76 BPM | RESPIRATION RATE: 18 BRPM | SYSTOLIC BLOOD PRESSURE: 110 MMHG | TEMPERATURE: 99 F | WEIGHT: 170.42 LBS | HEIGHT: 67 IN | DIASTOLIC BLOOD PRESSURE: 66 MMHG

## 2022-04-06 PROCEDURE — 88305 TISSUE EXAM BY PATHOLOGIST: CPT | Mod: 26

## 2022-04-06 PROCEDURE — 59812 TREATMENT OF MISCARRIAGE: CPT

## 2022-04-06 RX ORDER — HYDROMORPHONE HYDROCHLORIDE 2 MG/ML
0.5 INJECTION INTRAMUSCULAR; INTRAVENOUS; SUBCUTANEOUS ONCE
Refills: 0 | Status: DISCONTINUED | OUTPATIENT
Start: 2022-04-06 | End: 2022-04-06

## 2022-04-06 RX ORDER — SODIUM CHLORIDE 9 MG/ML
1000 INJECTION, SOLUTION INTRAVENOUS
Refills: 0 | Status: DISCONTINUED | OUTPATIENT
Start: 2022-04-06 | End: 2022-04-20

## 2022-04-06 RX ORDER — ACETAMINOPHEN 500 MG
650 TABLET ORAL ONCE
Refills: 0 | Status: DISCONTINUED | OUTPATIENT
Start: 2022-04-06 | End: 2022-04-20

## 2022-04-06 RX ADMIN — SODIUM CHLORIDE 100 MILLILITER(S): 9 INJECTION, SOLUTION INTRAVENOUS at 08:04

## 2022-04-06 NOTE — CHART NOTE - NSCHARTNOTEFT_GEN_A_CORE
PACU ANESTHESIA ADMISSION NOTE      Procedure: Surgical treatment, , incomplete      Post op diagnosis:  Incomplete         ____  Intubated  TV:______       Rate: ______      FiO2: ______    _x___  Patent Airway    _x___  Full return of protective reflexes    _x___  Full recovery from anesthesia / back to baseline status    Vitals:    See anesthesia record      Mental Status:  _x___ Awake   _____ Alert   _____ Drowsy   _____ Sedated    Nausea/Vomiting:  _x___  NO       ______Yes,   See Post - Op Orders         Pain Scale (0-10):  __0___    Treatment: _x___ None    ____ See Post - Op/PCA Orders    Post - Operative Fluids:   __x__ Oral   ____ See Post - Op Orders    Plan: Discharge:   _x___Home       _____Floor     _____Critical Care    _____  Other:_________________    Comments:  No anesthesia issues or complications noted.  Discharge when criteria met.

## 2022-04-06 NOTE — ASU DISCHARGE PLAN (ADULT/PEDIATRIC) - CARE PROVIDER_API CALL
Toby Melo)  OBN  [address] [place] [address]  Phone: [phone]  Fax: [phone]  Established Patient  Follow Up Time: 2 weeks

## 2022-04-06 NOTE — ASU DISCHARGE PLAN (ADULT/PEDIATRIC) - NS MD DC FALL RISK RISK
For information on Fall & Injury Prevention, visit: [email or web address] OR  [email or web address] OR  https://www.cdc.gov/steadi/patient.html

## 2022-04-06 NOTE — H&P ADULT - ASSESSMENT
28yo, incomplete ab, continued bleeding  -reviewed risks of procedure include bleeding, infection, damage to uterus and perforation  -all questions answered, informed consent signed

## 2022-04-06 NOTE — BRIEF OPERATIVE NOTE - OPERATION/FINDINGS
uterus 8wk size, anteverted, no adnexal mass  normal vagina and cervix  retained POC removed with suction curette

## 2022-04-06 NOTE — H&P ADULT - HISTORY OF PRESENT ILLNESS
26yo  s/p 16 week pregnancy loss/miscarriage in Feb here for D&C due to continued heavy vaginal bleeding. HCG level tracked down to 0, but pt continues to have bleeding and possible retained POC on ultrasound. Desiring D&C, offered conservative management as well

## 2022-04-07 LAB — SURGICAL PATHOLOGY STUDY: SIGNIFICANT CHANGE UP

## 2022-04-12 DIAGNOSIS — N71.1 CHRONIC INFLAMMATORY DISEASE OF UTERUS: ICD-10-CM

## 2022-04-12 RX ORDER — DOXYCYCLINE 100 MG/1
100 CAPSULE ORAL TWICE DAILY
Qty: 20 | Refills: 0 | Status: ACTIVE | COMMUNITY
Start: 2022-04-12 | End: 1900-01-01

## 2022-04-13 DIAGNOSIS — O03.4 INCOMPLETE SPONTANEOUS ABORTION WITHOUT COMPLICATION: ICD-10-CM

## 2022-05-12 ENCOUNTER — OUTPATIENT (OUTPATIENT)
Dept: OUTPATIENT SERVICES | Facility: HOSPITAL | Age: 27
LOS: 1 days | Discharge: HOME | End: 2022-05-12

## 2022-05-12 ENCOUNTER — APPOINTMENT (OUTPATIENT)
Dept: OBGYN | Facility: CLINIC | Age: 27
End: 2022-05-12
Payer: MEDICAID

## 2022-05-12 VITALS
BODY MASS INDEX: 25.31 KG/M2 | DIASTOLIC BLOOD PRESSURE: 61 MMHG | SYSTOLIC BLOOD PRESSURE: 110 MMHG | WEIGHT: 167 LBS | HEIGHT: 68 IN

## 2022-05-12 DIAGNOSIS — N91.2 AMENORRHEA, UNSPECIFIED: ICD-10-CM

## 2022-05-12 PROCEDURE — 99024 POSTOP FOLLOW-UP VISIT: CPT

## 2022-05-16 PROBLEM — N91.2 AMENORRHEA: Status: ACTIVE | Noted: 2021-12-22

## 2022-05-16 NOTE — DISCUSSION/SUMMARY
[FreeTextEntry1] : 28yo s/p D&C for incomplete ab, doing well\par -f/u for annual exam\par -reviewed again contraception, pt planning to use timed intercourse, understands risk of pregnancy and would be ok if she were pregnant again

## 2022-05-16 NOTE — HISTORY OF PRESENT ILLNESS
[FreeTextEntry1] : 26yo s/p d&c for retained POC, doing well, no complaints, no bleeding, no pain\par s/p doxy for chronic endometritis

## 2022-05-16 NOTE — PHYSICAL EXAM
[Chaperone Present] : A chaperone was present in the examining room during all aspects of the physical examination [Appropriately responsive] : appropriately responsive [Alert] : alert [No Acute Distress] : no acute distress [Labia Majora] : normal [Normal] : normal [Tenderness] : nontender

## 2023-05-01 NOTE — ED ADULT NURSE NOTE - NS ED NURSE RECORD ANOTHER VITAL SIGN
Refill Routing Note   Medication(s) are not appropriate for processing by [facility] for the following reason(s):      Patient seen in ED/Hospital since LOV with PCP  Required labs outdated    [facility] action(s):  Defer Labs due          Appointments  past 12m or future 3m with PCP    Date Provider   Last Visit   8/23/2022 Masood Khan MD   Next Visit   Visit date not found Masood Khan MD   ED visits in past 90 days: 0        Note composed:9:24 AM 05/01/2023           Yes

## 2023-07-15 ENCOUNTER — EMERGENCY (EMERGENCY)
Facility: HOSPITAL | Age: 28
LOS: 0 days | Discharge: HOME | End: 2023-07-15
Attending: EMERGENCY MEDICINE
Payer: MEDICAID

## 2023-07-15 VITALS
RESPIRATION RATE: 19 BRPM | HEART RATE: 92 BPM | OXYGEN SATURATION: 100 % | DIASTOLIC BLOOD PRESSURE: 80 MMHG | SYSTOLIC BLOOD PRESSURE: 116 MMHG

## 2023-07-15 VITALS
HEART RATE: 109 BPM | DIASTOLIC BLOOD PRESSURE: 89 MMHG | OXYGEN SATURATION: 100 % | SYSTOLIC BLOOD PRESSURE: 124 MMHG | RESPIRATION RATE: 24 BRPM | TEMPERATURE: 99 F

## 2023-07-15 DIAGNOSIS — O26.891 OTHER SPECIFIED PREGNANCY RELATED CONDITIONS, FIRST TRIMESTER: ICD-10-CM

## 2023-07-15 DIAGNOSIS — Z3A.01 LESS THAN 8 WEEKS GESTATION OF PREGNANCY: ICD-10-CM

## 2023-07-15 DIAGNOSIS — R07.81 PLEURODYNIA: ICD-10-CM

## 2023-07-15 DIAGNOSIS — M54.89 OTHER DORSALGIA: ICD-10-CM

## 2023-07-15 DIAGNOSIS — R07.89 OTHER CHEST PAIN: ICD-10-CM

## 2023-07-15 LAB
ALBUMIN SERPL ELPH-MCNC: 4.5 G/DL — SIGNIFICANT CHANGE UP (ref 3.5–5.2)
ALP SERPL-CCNC: 65 U/L — SIGNIFICANT CHANGE UP (ref 30–115)
ALT FLD-CCNC: 17 U/L — SIGNIFICANT CHANGE UP (ref 0–41)
ANION GAP SERPL CALC-SCNC: 13 MMOL/L — SIGNIFICANT CHANGE UP (ref 7–14)
APPEARANCE UR: CLEAR — SIGNIFICANT CHANGE UP
AST SERPL-CCNC: 19 U/L — SIGNIFICANT CHANGE UP (ref 0–41)
BACTERIA # UR AUTO: ABNORMAL /HPF
BASOPHILS # BLD AUTO: 0.03 K/UL — SIGNIFICANT CHANGE UP (ref 0–0.2)
BASOPHILS NFR BLD AUTO: 0.4 % — SIGNIFICANT CHANGE UP (ref 0–1)
BILIRUB SERPL-MCNC: 0.6 MG/DL — SIGNIFICANT CHANGE UP (ref 0.2–1.2)
BILIRUB UR-MCNC: NEGATIVE — SIGNIFICANT CHANGE UP
BUN SERPL-MCNC: 7 MG/DL — LOW (ref 10–20)
CALCIUM SERPL-MCNC: 9.5 MG/DL — SIGNIFICANT CHANGE UP (ref 8.4–10.5)
CHLORIDE SERPL-SCNC: 106 MMOL/L — SIGNIFICANT CHANGE UP (ref 98–110)
CO2 SERPL-SCNC: 22 MMOL/L — SIGNIFICANT CHANGE UP (ref 17–32)
COD CRY URNS QL: NEGATIVE — SIGNIFICANT CHANGE UP
COLOR SPEC: YELLOW — SIGNIFICANT CHANGE UP
CREAT SERPL-MCNC: 0.8 MG/DL — SIGNIFICANT CHANGE UP (ref 0.7–1.5)
D DIMER BLD IA.RAPID-MCNC: 154 NG/ML DDU — SIGNIFICANT CHANGE UP
DIFF PNL FLD: NEGATIVE — SIGNIFICANT CHANGE UP
EGFR: 103 ML/MIN/1.73M2 — SIGNIFICANT CHANGE UP
EOSINOPHIL # BLD AUTO: 0.11 K/UL — SIGNIFICANT CHANGE UP (ref 0–0.7)
EOSINOPHIL NFR BLD AUTO: 1.3 % — SIGNIFICANT CHANGE UP (ref 0–8)
EPI CELLS # UR: 25 /HPF — SIGNIFICANT CHANGE UP (ref 0–5)
GLUCOSE SERPL-MCNC: 79 MG/DL — SIGNIFICANT CHANGE UP (ref 70–99)
GLUCOSE UR QL: NEGATIVE MG/DL — SIGNIFICANT CHANGE UP
GRAN CASTS # UR COMP ASSIST: NEGATIVE — SIGNIFICANT CHANGE UP
HCG SERPL QL: POSITIVE — SIGNIFICANT CHANGE UP
HCG SERPL-ACNC: 17.9 MIU/ML — HIGH
HCT VFR BLD CALC: 37.7 % — SIGNIFICANT CHANGE UP (ref 37–47)
HGB BLD-MCNC: 12.3 G/DL — SIGNIFICANT CHANGE UP (ref 12–16)
HYALINE CASTS # UR AUTO: NEGATIVE — SIGNIFICANT CHANGE UP
IMM GRANULOCYTES NFR BLD AUTO: 0.2 % — SIGNIFICANT CHANGE UP (ref 0.1–0.3)
KETONES UR-MCNC: NEGATIVE — SIGNIFICANT CHANGE UP
LEUKOCYTE ESTERASE UR-ACNC: ABNORMAL
LYMPHOCYTES # BLD AUTO: 2.59 K/UL — SIGNIFICANT CHANGE UP (ref 1.2–3.4)
LYMPHOCYTES # BLD AUTO: 30.3 % — SIGNIFICANT CHANGE UP (ref 20.5–51.1)
MAGNESIUM SERPL-MCNC: 1.9 MG/DL — SIGNIFICANT CHANGE UP (ref 1.8–2.4)
MCHC RBC-ENTMCNC: 26.3 PG — LOW (ref 27–31)
MCHC RBC-ENTMCNC: 32.6 G/DL — SIGNIFICANT CHANGE UP (ref 32–37)
MCV RBC AUTO: 80.7 FL — LOW (ref 81–99)
MONOCYTES # BLD AUTO: 0.51 K/UL — SIGNIFICANT CHANGE UP (ref 0.1–0.6)
MONOCYTES NFR BLD AUTO: 6 % — SIGNIFICANT CHANGE UP (ref 1.7–9.3)
NEUTROPHILS # BLD AUTO: 5.3 K/UL — SIGNIFICANT CHANGE UP (ref 1.4–6.5)
NEUTROPHILS NFR BLD AUTO: 61.8 % — SIGNIFICANT CHANGE UP (ref 42.2–75.2)
NITRITE UR-MCNC: NEGATIVE — SIGNIFICANT CHANGE UP
NRBC # BLD: 0 /100 WBCS — SIGNIFICANT CHANGE UP (ref 0–0)
PH UR: 7.5 — SIGNIFICANT CHANGE UP (ref 5–8)
PLATELET # BLD AUTO: 274 K/UL — SIGNIFICANT CHANGE UP (ref 130–400)
PMV BLD: 9.9 FL — SIGNIFICANT CHANGE UP (ref 7.4–10.4)
POTASSIUM SERPL-MCNC: 4.1 MMOL/L — SIGNIFICANT CHANGE UP (ref 3.5–5)
POTASSIUM SERPL-SCNC: 4.1 MMOL/L — SIGNIFICANT CHANGE UP (ref 3.5–5)
PROT SERPL-MCNC: 7.2 G/DL — SIGNIFICANT CHANGE UP (ref 6–8)
PROT UR-MCNC: ABNORMAL MG/DL
RBC # BLD: 4.67 M/UL — SIGNIFICANT CHANGE UP (ref 4.2–5.4)
RBC # FLD: 13.2 % — SIGNIFICANT CHANGE UP (ref 11.5–14.5)
RBC CASTS # UR COMP ASSIST: ABNORMAL /HPF (ref 0–4)
SODIUM SERPL-SCNC: 141 MMOL/L — SIGNIFICANT CHANGE UP (ref 135–146)
SP GR SPEC: 1.02 — SIGNIFICANT CHANGE UP (ref 1.01–1.03)
TRI-PHOS CRY UR QL COMP ASSIST: NEGATIVE — SIGNIFICANT CHANGE UP
TROPONIN T SERPL-MCNC: <0.01 NG/ML — SIGNIFICANT CHANGE UP
URATE CRY FLD QL MICRO: NEGATIVE — SIGNIFICANT CHANGE UP
UROBILINOGEN FLD QL: 0.2 MG/DL — SIGNIFICANT CHANGE UP
WBC # BLD: 8.56 K/UL — SIGNIFICANT CHANGE UP (ref 4.8–10.8)
WBC # FLD AUTO: 8.56 K/UL — SIGNIFICANT CHANGE UP (ref 4.8–10.8)
WBC UR QL: ABNORMAL /HPF (ref 0–5)

## 2023-07-15 PROCEDURE — 85379 FIBRIN DEGRADATION QUANT: CPT

## 2023-07-15 PROCEDURE — 99285 EMERGENCY DEPT VISIT HI MDM: CPT

## 2023-07-15 PROCEDURE — 99285 EMERGENCY DEPT VISIT HI MDM: CPT | Mod: 25

## 2023-07-15 PROCEDURE — 80053 COMPREHEN METABOLIC PANEL: CPT

## 2023-07-15 PROCEDURE — 76856 US EXAM PELVIC COMPLETE: CPT

## 2023-07-15 PROCEDURE — 71046 X-RAY EXAM CHEST 2 VIEWS: CPT | Mod: 26

## 2023-07-15 PROCEDURE — 93010 ELECTROCARDIOGRAM REPORT: CPT

## 2023-07-15 PROCEDURE — 84703 CHORIONIC GONADOTROPIN ASSAY: CPT

## 2023-07-15 PROCEDURE — 84702 CHORIONIC GONADOTROPIN TEST: CPT

## 2023-07-15 PROCEDURE — 93005 ELECTROCARDIOGRAM TRACING: CPT

## 2023-07-15 PROCEDURE — 76856 US EXAM PELVIC COMPLETE: CPT | Mod: 26

## 2023-07-15 PROCEDURE — 71046 X-RAY EXAM CHEST 2 VIEWS: CPT

## 2023-07-15 PROCEDURE — 81001 URINALYSIS AUTO W/SCOPE: CPT

## 2023-07-15 PROCEDURE — 85025 COMPLETE CBC W/AUTO DIFF WBC: CPT

## 2023-07-15 PROCEDURE — 36415 COLL VENOUS BLD VENIPUNCTURE: CPT

## 2023-07-15 PROCEDURE — 84484 ASSAY OF TROPONIN QUANT: CPT

## 2023-07-15 PROCEDURE — 83735 ASSAY OF MAGNESIUM: CPT

## 2023-07-15 RX ORDER — ACETAMINOPHEN 500 MG
975 TABLET ORAL ONCE
Refills: 0 | Status: COMPLETED | OUTPATIENT
Start: 2023-07-15 | End: 2023-07-15

## 2023-07-15 RX ORDER — NITROFURANTOIN MACROCRYSTAL 50 MG
1 CAPSULE ORAL
Qty: 14 | Refills: 0
Start: 2023-07-15 | End: 2023-07-21

## 2023-07-15 RX ADMIN — Medication 975 MILLIGRAM(S): at 17:34

## 2023-07-15 RX ADMIN — Medication 975 MILLIGRAM(S): at 18:13

## 2023-07-15 NOTE — ED PROVIDER NOTE - NS ED ATTENDING STATEMENT MOD
This was a shared visit with the SHANDA. I reviewed and verified the documentation and independently performed the documented:

## 2023-07-15 NOTE — ED PROVIDER NOTE - ATTENDING APP SHARED VISIT CONTRIBUTION OF CARE
29 yo F no pmh present with right upper chest pain. States that for the last few days has been having pain to the right upper ribs. Pain worse with movement and taking deep breaths. no fall or trauma. no heavy lifting or unusual activity. no fevers or recent illness. no n/v, no abdominal pain. no leg swelling or pain. no chest pain or pressure. + shortness of breath, no palpitations. no OCP use. no recent travel or surgeries. LMP 1 month ago.     CONSTITUTIONAL: Well-developed; well-nourished; in no acute distress.   SKIN: warm, dry. no ecchymosis, no rashes.   HEAD: Normocephalic; atraumatic.  EYES: PERRL, EOMI, no conjunctival erythema  ENT: No nasal discharge; airway clear.  NECK: Supple; non tender.  CARD: S1, S2 normal;  Regular rate and rhythm.   RESP: No wheezes, rales or rhonchi. + right lateral chest wall tenderness.   ABD: soft non tender, non distended, no rebound or guarding  EXT: Normal ROM.  5/5 strength in all 4 extremities   LYMPH: No acute cervical adenopathy.  NEURO: Alert, oriented, grossly unremarkable. neurovascularly intact  PSYCH: Cooperative, appropriate.

## 2023-07-15 NOTE — ED PROVIDER NOTE - OBJECTIVE STATEMENT
27 y/o female presents to the Ed with 3 days of worsening right mid back pain worse with movement and deep breathing . patient denies any heavy lifting or falls. no hemoptysis, not on birth control and no recent travel . no calf pain or swelling. no palpitations or chest pain . patient states pain worse today.

## 2023-07-15 NOTE — ED PROVIDER NOTE - PROGRESS NOTE DETAILS
patient's pregnancy test positive. Results discussed. labs, sono ordered. LMP was june 22nd. Patient signed out to Dr. Plascencia pending imaging and reassessment.

## 2023-07-15 NOTE — ED ADULT NURSE NOTE - NSFALLHARMRISKINTERV_ED_ALL_ED

## 2023-07-15 NOTE — ED PROVIDER NOTE - PATIENT PORTAL LINK FT
You can access the FollowMyHealth Patient Portal offered by [facility] by registering at the following website: [email or web address]. By joining [employer]’s FollowMyHealth portal, you will also be able to view your health information using other applications (apps) compatible with our system.

## 2023-07-15 NOTE — ED PROVIDER NOTE - PHYSICAL EXAMINATION
Vital Signs: I have reviewed the initial vital signs.  Constitutional: well-nourished, no acute distress  Eyes: PERRLA, EOMI, clear conjunctiva  ENT: MMM,   Cardiovascular: regular rate, regular rhythm, no murmur appreciated  Respiratory: unlabored respiratory effort, clear to auscultation bilaterally, +tenderness right mid chest wall to palpation, no bony step off   Gastrointestinal: soft, non-tender, non-distended  abdomen, no pulsatile mass  Musculoskeletal: supple neck, no lower extremity edema or tenderness , no bony tenderness  Integumentary: warm, dry, no rash  Neurologic: awake, alert, cranial nerves II-XII grossly intact, extremities’ motor and sensory functions grossly intact, no focal deficits

## 2023-07-15 NOTE — ED PROVIDER NOTE - CARE PROVIDER_API CALL
Amrik Dobbins  Obstetrics and Gynecology  [address] [place] [id number]  Phone: [phone]  Fax: [phone]  Follow Up Time:

## 2023-07-15 NOTE — ED PROVIDER NOTE - CLINICAL SUMMARY MEDICAL DECISION MAKING FREE TEXT BOX
28-year-old female, no past medical history presents with right rib pain for few days.  Exam unremarkable.  Labs negative including troponin and D-dimer.  Urinalysis WBC 6-10, bacteria moderate.  Serum beta-hCG 17.9.  EKG normal sinus rhythm no acute changes.  Chest x-ray no acute disease.  Pelvic ultrasound no IUP.  Will DC on Macrobid and refer to OB.

## 2023-07-15 NOTE — ED PROVIDER NOTE - NSFOLLOWUPINSTRUCTIONS_ED_ALL_ED_FT
Urinary Tract Infection    A urinary tract infection (UTI) is an infection of any part of the urinary tract, which includes the kidneys, ureters, bladder, and urethra. Risk factors include ignoring your need to urinate, wiping back to front if female, being an uncircumcised male, and having diabetes or a weak immune system. Symptoms include frequent urination, pain or burning with urination, foul smelling urine, cloudy urine, pain in the lower abdomen, blood in the urine, and fever. If you were prescribed an antibiotic medicine, take it as told by your health care provider. Do not stop taking the antibiotic even if you start to feel better.    SEEK IMMEDIATE MEDICAL CARE IF YOU HAVE ANY OF THE FOLLOWING SYMPTOMS: severe back or abdominal pain, fever, inability to keep fluids or medicine down, dizziness/lightheadedness, or a change in mental status.      Chest Wall Pain    Chest wall pain is pain in or around the bones and muscles of your chest. Sometimes, an injury causes this pain. Sometimes, the cause may not be known. This pain may take several weeks or longer to get better.     HOME CARE  Pay attention to any changes in your symptoms. Take these actions to help with your pain:    Rest as told by your doctor.  Avoid activities that cause pain. Try not to use your chest, belly (abdominal), or side muscles to lift heavy things.  If directed, apply ice to the painful area:  Put ice in a plastic bag.  Place a towel between your skin and the bag.  Leave the ice on for 20 minutes, 2–3 times per day.  Take over-the-counter and prescription medicines only as told by your doctor.  Do not use tobacco products, including cigarettes, chewing tobacco, and e-cigarettes. If you need help quitting, ask your doctor.  Keep all follow-up visits as told by your doctor. This is important.    GET HELP IF:  You have a fever.  Your chest pain gets worse.  You have new symptoms.    GET HELP RIGHT AWAY IF:  You feel sick to your stomach (nauseous) or you throw up (vomit).  You feel sweaty or light-headed.  You have a cough with phlegm (sputum) or you cough up blood.  You are short of breath.    ADDITIONAL NOTES AND INSTRUCTIONS    Please follow up with your Primary MD in 24-48 hr.  Seek immediate medical care for any new/worsening signs or symptoms.

## 2023-07-19 ENCOUNTER — EMERGENCY (EMERGENCY)
Facility: HOSPITAL | Age: 28
LOS: 0 days | Discharge: HOME | End: 2023-07-19
Attending: EMERGENCY MEDICINE
Payer: MEDICAID

## 2023-07-19 VITALS
WEIGHT: 158.07 LBS | OXYGEN SATURATION: 100 % | RESPIRATION RATE: 17 BRPM | TEMPERATURE: 99 F | SYSTOLIC BLOOD PRESSURE: 124 MMHG | DIASTOLIC BLOOD PRESSURE: 82 MMHG | HEART RATE: 98 BPM | HEIGHT: 66 IN

## 2023-07-19 VITALS
DIASTOLIC BLOOD PRESSURE: 69 MMHG | RESPIRATION RATE: 18 BRPM | HEART RATE: 71 BPM | OXYGEN SATURATION: 99 % | SYSTOLIC BLOOD PRESSURE: 106 MMHG

## 2023-07-19 DIAGNOSIS — R07.81 PLEURODYNIA: ICD-10-CM

## 2023-07-19 DIAGNOSIS — Z3A.01 LESS THAN 8 WEEKS GESTATION OF PREGNANCY: ICD-10-CM

## 2023-07-19 DIAGNOSIS — O99.891 OTHER SPECIFIED DISEASES AND CONDITIONS COMPLICATING PREGNANCY: ICD-10-CM

## 2023-07-19 LAB
ALBUMIN SERPL ELPH-MCNC: 4.6 G/DL — SIGNIFICANT CHANGE UP (ref 3.5–5.2)
ALP SERPL-CCNC: 68 U/L — SIGNIFICANT CHANGE UP (ref 30–115)
ALT FLD-CCNC: 19 U/L — SIGNIFICANT CHANGE UP (ref 0–41)
ANION GAP SERPL CALC-SCNC: 11 MMOL/L — SIGNIFICANT CHANGE UP (ref 7–14)
APPEARANCE UR: CLEAR — SIGNIFICANT CHANGE UP
APTT BLD: 37.8 SEC — SIGNIFICANT CHANGE UP (ref 27–39.2)
AST SERPL-CCNC: 18 U/L — SIGNIFICANT CHANGE UP (ref 0–41)
BACTERIA # UR AUTO: ABNORMAL /HPF
BASOPHILS # BLD AUTO: 0.04 K/UL — SIGNIFICANT CHANGE UP (ref 0–0.2)
BASOPHILS NFR BLD AUTO: 0.5 % — SIGNIFICANT CHANGE UP (ref 0–1)
BILIRUB SERPL-MCNC: 0.3 MG/DL — SIGNIFICANT CHANGE UP (ref 0.2–1.2)
BILIRUB UR-MCNC: NEGATIVE — SIGNIFICANT CHANGE UP
BLD GP AB SCN SERPL QL: SIGNIFICANT CHANGE UP
BUN SERPL-MCNC: 8 MG/DL — LOW (ref 10–20)
CALCIUM SERPL-MCNC: 9.3 MG/DL — SIGNIFICANT CHANGE UP (ref 8.4–10.5)
CHLORIDE SERPL-SCNC: 104 MMOL/L — SIGNIFICANT CHANGE UP (ref 98–110)
CO2 SERPL-SCNC: 24 MMOL/L — SIGNIFICANT CHANGE UP (ref 17–32)
COD CRY URNS QL: NEGATIVE — SIGNIFICANT CHANGE UP
COLOR SPEC: YELLOW — SIGNIFICANT CHANGE UP
CREAT SERPL-MCNC: 0.7 MG/DL — SIGNIFICANT CHANGE UP (ref 0.7–1.5)
DIFF PNL FLD: NEGATIVE — SIGNIFICANT CHANGE UP
EGFR: 121 ML/MIN/1.73M2 — SIGNIFICANT CHANGE UP
EOSINOPHIL # BLD AUTO: 0.17 K/UL — SIGNIFICANT CHANGE UP (ref 0–0.7)
EOSINOPHIL NFR BLD AUTO: 2 % — SIGNIFICANT CHANGE UP (ref 0–8)
EPI CELLS # UR: ABNORMAL /HPF (ref 0–5)
GLUCOSE SERPL-MCNC: 107 MG/DL — HIGH (ref 70–99)
GLUCOSE UR QL: NEGATIVE MG/DL — SIGNIFICANT CHANGE UP
GRAN CASTS # UR COMP ASSIST: NEGATIVE — SIGNIFICANT CHANGE UP
HCG SERPL-ACNC: 268.3 MIU/ML — HIGH
HCT VFR BLD CALC: 40.6 % — SIGNIFICANT CHANGE UP (ref 37–47)
HGB BLD-MCNC: 13.3 G/DL — SIGNIFICANT CHANGE UP (ref 12–16)
HYALINE CASTS # UR AUTO: NEGATIVE — SIGNIFICANT CHANGE UP
IMM GRANULOCYTES NFR BLD AUTO: 0.5 % — HIGH (ref 0.1–0.3)
INR BLD: 0.97 RATIO — SIGNIFICANT CHANGE UP (ref 0.65–1.3)
KETONES UR-MCNC: NEGATIVE — SIGNIFICANT CHANGE UP
LEUKOCYTE ESTERASE UR-ACNC: ABNORMAL
LYMPHOCYTES # BLD AUTO: 1.99 K/UL — SIGNIFICANT CHANGE UP (ref 1.2–3.4)
LYMPHOCYTES # BLD AUTO: 23.5 % — SIGNIFICANT CHANGE UP (ref 20.5–51.1)
MCHC RBC-ENTMCNC: 26.5 PG — LOW (ref 27–31)
MCHC RBC-ENTMCNC: 32.8 G/DL — SIGNIFICANT CHANGE UP (ref 32–37)
MCV RBC AUTO: 81 FL — SIGNIFICANT CHANGE UP (ref 81–99)
MONOCYTES # BLD AUTO: 0.45 K/UL — SIGNIFICANT CHANGE UP (ref 0.1–0.6)
MONOCYTES NFR BLD AUTO: 5.3 % — SIGNIFICANT CHANGE UP (ref 1.7–9.3)
NEUTROPHILS # BLD AUTO: 5.79 K/UL — SIGNIFICANT CHANGE UP (ref 1.4–6.5)
NEUTROPHILS NFR BLD AUTO: 68.2 % — SIGNIFICANT CHANGE UP (ref 42.2–75.2)
NITRITE UR-MCNC: NEGATIVE — SIGNIFICANT CHANGE UP
NRBC # BLD: 0 /100 WBCS — SIGNIFICANT CHANGE UP (ref 0–0)
PH UR: 7 — SIGNIFICANT CHANGE UP (ref 5–8)
PLATELET # BLD AUTO: 271 K/UL — SIGNIFICANT CHANGE UP (ref 130–400)
PMV BLD: 10 FL — SIGNIFICANT CHANGE UP (ref 7.4–10.4)
POTASSIUM SERPL-MCNC: 4.2 MMOL/L — SIGNIFICANT CHANGE UP (ref 3.5–5)
POTASSIUM SERPL-SCNC: 4.2 MMOL/L — SIGNIFICANT CHANGE UP (ref 3.5–5)
PROT SERPL-MCNC: 7.2 G/DL — SIGNIFICANT CHANGE UP (ref 6–8)
PROT UR-MCNC: NEGATIVE MG/DL — SIGNIFICANT CHANGE UP
PROTHROM AB SERPL-ACNC: 11.1 SEC — SIGNIFICANT CHANGE UP (ref 9.95–12.87)
RBC # BLD: 5.01 M/UL — SIGNIFICANT CHANGE UP (ref 4.2–5.4)
RBC # FLD: 13.3 % — SIGNIFICANT CHANGE UP (ref 11.5–14.5)
RBC CASTS # UR COMP ASSIST: NEGATIVE — SIGNIFICANT CHANGE UP (ref 0–4)
SODIUM SERPL-SCNC: 139 MMOL/L — SIGNIFICANT CHANGE UP (ref 135–146)
SP GR SPEC: 1.02 — SIGNIFICANT CHANGE UP (ref 1.01–1.03)
TRI-PHOS CRY UR QL COMP ASSIST: NEGATIVE — SIGNIFICANT CHANGE UP
URATE CRY FLD QL MICRO: NEGATIVE — SIGNIFICANT CHANGE UP
UROBILINOGEN FLD QL: 0.2 MG/DL — SIGNIFICANT CHANGE UP
WBC # BLD: 8.48 K/UL — SIGNIFICANT CHANGE UP (ref 4.8–10.8)
WBC # FLD AUTO: 8.48 K/UL — SIGNIFICANT CHANGE UP (ref 4.8–10.8)
WBC UR QL: ABNORMAL /HPF (ref 0–5)

## 2023-07-19 PROCEDURE — 76830 TRANSVAGINAL US NON-OB: CPT

## 2023-07-19 PROCEDURE — 81001 URINALYSIS AUTO W/SCOPE: CPT

## 2023-07-19 PROCEDURE — 86900 BLOOD TYPING SEROLOGIC ABO: CPT

## 2023-07-19 PROCEDURE — 80053 COMPREHEN METABOLIC PANEL: CPT

## 2023-07-19 PROCEDURE — 85730 THROMBOPLASTIN TIME PARTIAL: CPT

## 2023-07-19 PROCEDURE — 99284 EMERGENCY DEPT VISIT MOD MDM: CPT | Mod: 25

## 2023-07-19 PROCEDURE — 76830 TRANSVAGINAL US NON-OB: CPT | Mod: 26

## 2023-07-19 PROCEDURE — 85025 COMPLETE CBC W/AUTO DIFF WBC: CPT

## 2023-07-19 PROCEDURE — 36415 COLL VENOUS BLD VENIPUNCTURE: CPT

## 2023-07-19 PROCEDURE — 99284 EMERGENCY DEPT VISIT MOD MDM: CPT

## 2023-07-19 PROCEDURE — 85610 PROTHROMBIN TIME: CPT

## 2023-07-19 PROCEDURE — 86850 RBC ANTIBODY SCREEN: CPT

## 2023-07-19 PROCEDURE — 86901 BLOOD TYPING SEROLOGIC RH(D): CPT

## 2023-07-19 PROCEDURE — 84702 CHORIONIC GONADOTROPIN TEST: CPT

## 2023-07-19 NOTE — ED PROVIDER NOTE - OBJECTIVE STATEMENT
28-year-old female presents to the ED for evaluation.  Patient was seen in the ED over the weekend had a full work-up was found to be pregnant.  Patient states that she was given antibiotics for bacteria in her urine.  Patient states she received a call the next day saying that she had a lucency on her rib.  Patient followed up with her PMD and was told to see a pulmonologist.  Patient came to the ED for further evaluation.  Patient's PMD also wanted her to have repeat labs and ultrasound for the pregnancy

## 2023-07-19 NOTE — ED ADULT NURSE NOTE - NSFALLUNIVINTERV_ED_ALL_ED
Bed/Stretcher in lowest position, wheels locked, appropriate side rails in place/Call bell, personal items and telephone in reach/Instruct patient to call for assistance before getting out of bed/chair/stretcher/Non-slip footwear applied when patient is off stretcher/[place] to call system/Physically safe environment - no spills, clutter or unnecessary equipment/Purposeful proactive rounding/Room/bathroom lighting operational, light cord in reach
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
## 2023-07-19 NOTE — ED PROVIDER NOTE - CLINICAL SUMMARY MEDICAL DECISION MAKING FREE TEXT BOX
28yF p/w R rib pain, sent back to the ED by her PCP given lucency on her rib identified by radiology on the recent CXR and given early pregnancy w/o visualized IUP on US (yet).  HCG rising appropriately but still below threshhold for detection on US; accordingly, US done tonight once again does not show IUP.  Pt counseled on likely findings from earlier chest xray and plan for further o/p f/u.  Also counseled pt on need for f/u with gyn to r/o ectopic.  Pt to f/u as indicated.  Recommend supportive care and return precautions.

## 2023-07-19 NOTE — ED PROVIDER NOTE - ATTENDING APP SHARED VISIT CONTRIBUTION OF CARE
28yF seen a few days ago for R side pain (also found to be pregnant, though HCG 17 and US w/o visualized IUP) was called back that radiology saw a lucency on her rib on CXR.  Pt went to her PCP who gave her a referral for a thoracic surgeon but also told her to go back to the ED for further evaluation.  Pain is still present in her side but improved compared to prior.

## 2023-07-19 NOTE — ED PROVIDER NOTE - PATIENT PORTAL LINK FT
You can access the FollowMyHealth Patient Portal offered by [facility] by registering at the following website: [email or web address]. By joining [employer]’s FollowMyHealth portal, you will also be able to view your health information using other applications (apps) compatible with our system.
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
## 2023-07-19 NOTE — ED PROVIDER NOTE - NSFOLLOWUPINSTRUCTIONS_ED_ALL_ED_FT
you need to follow up with your ob gyn this week for further evaluation      follow up with pulmonology as Suggested by your PCP

## 2023-07-31 ENCOUNTER — LABORATORY RESULT (OUTPATIENT)
Age: 28
End: 2023-07-31

## 2023-08-01 ENCOUNTER — APPOINTMENT (OUTPATIENT)
Dept: OBGYN | Facility: CLINIC | Age: 28
End: 2023-08-01
Payer: MEDICAID

## 2023-08-01 ENCOUNTER — LABORATORY RESULT (OUTPATIENT)
Age: 28
End: 2023-08-01

## 2023-08-01 PROCEDURE — 81003 URINALYSIS AUTO W/O SCOPE: CPT | Mod: QW

## 2023-08-01 PROCEDURE — 99395 PREV VISIT EST AGE 18-39: CPT

## 2023-08-01 PROCEDURE — 99213 OFFICE O/P EST LOW 20 MIN: CPT | Mod: 25

## 2023-08-03 ENCOUNTER — LABORATORY RESULT (OUTPATIENT)
Age: 28
End: 2023-08-03

## 2023-08-08 ENCOUNTER — APPOINTMENT (OUTPATIENT)
Dept: OBGYN | Facility: CLINIC | Age: 28
End: 2023-08-08
Payer: MEDICAID

## 2023-08-08 VITALS
OXYGEN SATURATION: 99 % | BODY MASS INDEX: 27.67 KG/M2 | TEMPERATURE: 98.2 F | DIASTOLIC BLOOD PRESSURE: 66 MMHG | SYSTOLIC BLOOD PRESSURE: 108 MMHG | WEIGHT: 182 LBS | HEART RATE: 88 BPM

## 2023-08-08 DIAGNOSIS — O30.001 TWIN PREGNANCY, UNSPECIFIED NUMBER OF PLACENTA AND UNSPECIFIED NUMBER OF AMNIOTIC SACS, FIRST TRIMESTER: ICD-10-CM

## 2023-08-08 PROCEDURE — 0502F SUBSEQUENT PRENATAL CARE: CPT

## 2023-08-19 ENCOUNTER — LABORATORY RESULT (OUTPATIENT)
Age: 28
End: 2023-08-19

## 2023-08-19 LAB
ABO + RH PNL BLD: NORMAL
BLD GP AB SCN SERPL QL: NORMAL
GLUCOSE SERPL-MCNC: 124 MG/DL
HCG SERPL-MCNC: ABNORMAL MIU/ML

## 2023-08-20 LAB
APPEARANCE: ABNORMAL
BILIRUBIN URINE: NEGATIVE
BLOOD URINE: NEGATIVE
COLOR: YELLOW
GLUCOSE QUALITATIVE U: NEGATIVE MG/DL
HBV CORE IGG+IGM SER QL: NONREACTIVE
HBV SURFACE AB SER QL: REACTIVE
HBV SURFACE AG SER QL: NONREACTIVE
HCV AB SER QL: NONREACTIVE
HCV S/CO RATIO: 0.09 S/CO
HEPATITIS A IGG ANTIBODY: REACTIVE
HGB A MFR BLD: 97.3 %
HGB A2 MFR BLD: 2.7 %
HGB FRACT BLD-IMP: NORMAL
KETONES URINE: NEGATIVE MG/DL
LEUKOCYTE ESTERASE URINE: ABNORMAL
NITRITE URINE: NEGATIVE
PH URINE: 6.5
PROGEST SERPL-MCNC: 20.1 NG/ML
PROTEIN URINE: NEGATIVE MG/DL
RUBV IGG FLD-ACNC: 2.6 INDEX
RUBV IGG SER-IMP: POSITIVE
SPECIFIC GRAVITY URINE: 1.01
T GONDII AB SER-IMP: NEGATIVE
T GONDII AB SER-IMP: NEGATIVE
T GONDII IGG SER QL: <3 IU/ML
T GONDII IGM SER QL: <3 AU/ML
T PALLIDUM AB SER QL IA: NEGATIVE
UROBILINOGEN URINE: 0.2 MG/DL
VZV AB TITR SER: POSITIVE
VZV IGG SER IF-ACNC: 435.6 INDEX

## 2023-08-21 LAB — BACTERIA UR CULT: NORMAL

## 2023-08-22 ENCOUNTER — APPOINTMENT (OUTPATIENT)
Dept: OBGYN | Facility: CLINIC | Age: 28
End: 2023-08-22
Payer: MEDICAID

## 2023-08-22 VITALS
OXYGEN SATURATION: 99 % | TEMPERATURE: 98 F | HEIGHT: 68 IN | WEIGHT: 184 LBS | BODY MASS INDEX: 27.89 KG/M2 | DIASTOLIC BLOOD PRESSURE: 60 MMHG | SYSTOLIC BLOOD PRESSURE: 104 MMHG | HEART RATE: 95 BPM

## 2023-08-22 PROCEDURE — 0502F SUBSEQUENT PRENATAL CARE: CPT

## 2023-08-25 LAB — FMR1 GENE MUT ANL BLD/T: NORMAL

## 2023-08-26 LAB — AR GENE MUT ANL BLD/T: NORMAL

## 2023-08-28 LAB — CFTR MUT TESTED BLD/T: NEGATIVE

## 2023-09-05 ENCOUNTER — APPOINTMENT (OUTPATIENT)
Dept: OBGYN | Facility: CLINIC | Age: 28
End: 2023-09-05
Payer: MEDICAID

## 2023-09-05 VITALS
BODY MASS INDEX: 28.43 KG/M2 | SYSTOLIC BLOOD PRESSURE: 100 MMHG | DIASTOLIC BLOOD PRESSURE: 64 MMHG | WEIGHT: 187 LBS | OXYGEN SATURATION: 99 % | TEMPERATURE: 97.9 F | HEART RATE: 88 BPM

## 2023-09-05 PROCEDURE — 0502F SUBSEQUENT PRENATAL CARE: CPT

## 2023-09-18 ENCOUNTER — ASOB RESULT (OUTPATIENT)
Age: 28
End: 2023-09-18

## 2023-09-18 ENCOUNTER — OUTPATIENT (OUTPATIENT)
Dept: OUTPATIENT SERVICES | Facility: HOSPITAL | Age: 28
LOS: 1 days | End: 2023-09-18
Payer: MEDICAID

## 2023-09-18 ENCOUNTER — APPOINTMENT (OUTPATIENT)
Dept: ANTEPARTUM | Facility: CLINIC | Age: 28
End: 2023-09-18
Payer: MEDICAID

## 2023-09-18 ENCOUNTER — NON-APPOINTMENT (OUTPATIENT)
Age: 28
End: 2023-09-18

## 2023-09-18 ENCOUNTER — OUTPATIENT (OUTPATIENT)
Dept: OUTPATIENT SERVICES | Facility: HOSPITAL | Age: 28
LOS: 1 days | End: 2023-09-18

## 2023-09-18 DIAGNOSIS — Z34.90 ENCOUNTER FOR SUPERVISION OF NORMAL PREGNANCY, UNSPECIFIED, UNSPECIFIED TRIMESTER: ICD-10-CM

## 2023-09-18 DIAGNOSIS — O30.001 TWIN PREGNANCY, UNSPECIFIED NUMBER OF PLACENTA AND UNSPECIFIED NUMBER OF AMNIOTIC SACS, FIRST TRIMESTER: ICD-10-CM

## 2023-09-18 PROCEDURE — 76802 OB US < 14 WKS ADDL FETUS: CPT

## 2023-09-18 PROCEDURE — 76801 OB US < 14 WKS SINGLE FETUS: CPT | Mod: 26

## 2023-09-18 PROCEDURE — 76813 OB US NUCHAL MEAS 1 GEST: CPT | Mod: 26

## 2023-09-18 PROCEDURE — 76813 OB US NUCHAL MEAS 1 GEST: CPT

## 2023-09-18 PROCEDURE — 76801 OB US < 14 WKS SINGLE FETUS: CPT

## 2023-09-18 PROCEDURE — 76814 OB US NUCHAL MEAS ADD-ON: CPT

## 2023-09-19 ENCOUNTER — APPOINTMENT (OUTPATIENT)
Dept: OBGYN | Facility: CLINIC | Age: 28
End: 2023-09-19
Payer: MEDICAID

## 2023-09-19 VITALS
TEMPERATURE: 98.2 F | DIASTOLIC BLOOD PRESSURE: 70 MMHG | WEIGHT: 186 LBS | OXYGEN SATURATION: 98 % | SYSTOLIC BLOOD PRESSURE: 104 MMHG | HEART RATE: 85 BPM | BODY MASS INDEX: 28.28 KG/M2

## 2023-09-19 DIAGNOSIS — O30.001 TWIN PREGNANCY, UNSPECIFIED NUMBER OF PLACENTA AND UNSPECIFIED NUMBER OF AMNIOTIC SACS, FIRST TRIMESTER: ICD-10-CM

## 2023-09-19 PROCEDURE — 0502F SUBSEQUENT PRENATAL CARE: CPT

## 2023-09-26 DIAGNOSIS — Z3A.12 12 WEEKS GESTATION OF PREGNANCY: ICD-10-CM

## 2023-09-26 DIAGNOSIS — Z36.82 ENCOUNTER FOR ANTENATAL SCREENING FOR NUCHAL TRANSLUCENCY: ICD-10-CM

## 2023-09-26 DIAGNOSIS — O30.031 TWIN PREGNANCY, MONOCHORIONIC/DIAMNIOTIC, FIRST TRIMESTER: ICD-10-CM

## 2023-09-26 DIAGNOSIS — Z36.89 ENCOUNTER FOR OTHER SPECIFIED ANTENATAL SCREENING: ICD-10-CM

## 2023-09-26 LAB
ADDITIONAL US: NORMAL
CHROMOSOME13 INTERPRETATION: NORMAL
CHROMOSOME13 TEST RESULT: NORMAL
CHROMOSOME18 INTERPRETATION: NORMAL
CHROMOSOME18 TEST RESULT: NORMAL
CHROMOSOME21 INTERPRETATION: NORMAL
CHROMOSOME21 TEST RESULT: NORMAL
COMMENTS: AFP: NORMAL
CRL SCAN TWIN B: NORMAL
CRL SCAN: NORMAL
CROWN RUMP LENGTH TWIN B: 73.9 MM
CROWN RUMP LENGTH: 66.8 MM
DOWN SYNDROME AGE RISK: NORMAL
DOWN SYNDROME INTERPRETATION: NORMAL
DOWN SYNDROME SCREENING RISK: NORMAL
FETAL FRACTION: NORMAL
GEST. AGE ON COLLECTION DATE: 13.3 WEEKS
HCG MOM: 3.22
HCG VALUE: 239.8 IU/ML
MATERNAL AGE AT EDD: 29.1 YR
NOTE: AFP: NORMAL
NT MOM TWIN B: 0.88
NT TWIN B: 1.7 MM
NUCHAL TRANSLUCENCY (NT): 1.7 MM
NUCHAL TRANSLUCENCY MOM: 0.98
NUMBER OF FETUSES: 2
PAPP-A MOM: 1.85
PAPP-A VALUE: 1918.9 NG/ML
PERFORMANCE AND LIMITATIONS: NORMAL
RACE: NORMAL
RESULTS AFP: NORMAL
SEX CHROMOSOME INTERPRETATION: NORMAL
SEX CHROMOSOME TEST RESULT: DETECTED
SONOGRAPHER ID#: NORMAL
SUBMIT PART 2 SAMPLE USING: NORMAL
TEST RESULTS: AFP: NORMAL
TRISOMY 18 AGE RISK: NORMAL
TRISOMY 18 INTERPRETATION: NORMAL
TRISOMY 18 SCREENING RISK: NORMAL
VERIFI WITH MICRODELETIONS: NOT DETECTED
WEIGHT AFP: 187 LBS

## 2023-10-03 ENCOUNTER — APPOINTMENT (OUTPATIENT)
Dept: OBGYN | Facility: CLINIC | Age: 28
End: 2023-10-03
Payer: MEDICAID

## 2023-10-03 VITALS
DIASTOLIC BLOOD PRESSURE: 60 MMHG | BODY MASS INDEX: 28.43 KG/M2 | WEIGHT: 187 LBS | OXYGEN SATURATION: 97 % | HEART RATE: 93 BPM | SYSTOLIC BLOOD PRESSURE: 100 MMHG | TEMPERATURE: 98.2 F

## 2023-10-03 PROCEDURE — 0502F SUBSEQUENT PRENATAL CARE: CPT

## 2023-10-16 ENCOUNTER — APPOINTMENT (OUTPATIENT)
Dept: ANTEPARTUM | Facility: CLINIC | Age: 28
End: 2023-10-16
Payer: MEDICAID

## 2023-10-16 ENCOUNTER — ASOB RESULT (OUTPATIENT)
Age: 28
End: 2023-10-16

## 2023-10-16 ENCOUNTER — OUTPATIENT (OUTPATIENT)
Dept: OUTPATIENT SERVICES | Facility: HOSPITAL | Age: 28
LOS: 1 days | End: 2023-10-16
Payer: MEDICAID

## 2023-10-16 DIAGNOSIS — Z34.90 ENCOUNTER FOR SUPERVISION OF NORMAL PREGNANCY, UNSPECIFIED, UNSPECIFIED TRIMESTER: ICD-10-CM

## 2023-10-16 DIAGNOSIS — O09.90 SUPERVISION OF HIGH RISK PREGNANCY, UNSPECIFIED, UNSPECIFIED TRIMESTER: ICD-10-CM

## 2023-10-16 PROCEDURE — 76810 OB US >/= 14 WKS ADDL FETUS: CPT | Mod: 26

## 2023-10-16 PROCEDURE — 76805 OB US >/= 14 WKS SNGL FETUS: CPT | Mod: 26

## 2023-10-16 PROCEDURE — 76817 TRANSVAGINAL US OBSTETRIC: CPT | Mod: 26

## 2023-10-16 PROCEDURE — 76820 UMBILICAL ARTERY ECHO: CPT

## 2023-10-16 PROCEDURE — 76805 OB US >/= 14 WKS SNGL FETUS: CPT

## 2023-10-16 PROCEDURE — 76817 TRANSVAGINAL US OBSTETRIC: CPT

## 2023-10-16 PROCEDURE — 76810 OB US >/= 14 WKS ADDL FETUS: CPT

## 2023-10-16 PROCEDURE — 76821 MIDDLE CEREBRAL ARTERY ECHO: CPT

## 2023-10-17 ENCOUNTER — APPOINTMENT (OUTPATIENT)
Dept: OBGYN | Facility: CLINIC | Age: 28
End: 2023-10-17

## 2023-10-18 DIAGNOSIS — O30.032 TWIN PREGNANCY, MONOCHORIONIC/DIAMNIOTIC, SECOND TRIMESTER: ICD-10-CM

## 2023-10-18 DIAGNOSIS — Z3A.16 16 WEEKS GESTATION OF PREGNANCY: ICD-10-CM

## 2023-10-18 DIAGNOSIS — O40.2XX1 POLYHYDRAMNIOS, SECOND TRIMESTER, FETUS 1: ICD-10-CM

## 2023-10-18 DIAGNOSIS — O99.332 SMOKING (TOBACCO) COMPLICATING PREGNANCY, SECOND TRIMESTER: ICD-10-CM

## 2023-10-18 DIAGNOSIS — O41.02X2: ICD-10-CM

## 2023-10-18 DIAGNOSIS — O35.8XX2 MATERNAL CARE FOR OTHER (SUSPECTED) FETAL ABNORMALITY AND DAMAGE, FETUS 2: ICD-10-CM

## 2023-10-18 DIAGNOSIS — O43.022 FETUS-TO-FETUS PLACENTAL TRANSFUSION SYNDROME, SECOND TRIMESTER: ICD-10-CM

## 2023-10-19 ENCOUNTER — APPOINTMENT (OUTPATIENT)
Dept: OBGYN | Facility: CLINIC | Age: 28
End: 2023-10-19
Payer: MEDICAID

## 2023-10-19 VITALS
BODY MASS INDEX: 29.19 KG/M2 | SYSTOLIC BLOOD PRESSURE: 108 MMHG | HEART RATE: 98 BPM | WEIGHT: 192 LBS | DIASTOLIC BLOOD PRESSURE: 60 MMHG | TEMPERATURE: 98 F | OXYGEN SATURATION: 98 %

## 2023-10-19 VITALS
WEIGHT: 192 LBS | SYSTOLIC BLOOD PRESSURE: 108 MMHG | OXYGEN SATURATION: 98 % | DIASTOLIC BLOOD PRESSURE: 60 MMHG | HEART RATE: 98 BPM | TEMPERATURE: 98 F | BODY MASS INDEX: 29.19 KG/M2

## 2023-10-19 PROCEDURE — 0502F SUBSEQUENT PRENATAL CARE: CPT

## 2023-11-07 ENCOUNTER — APPOINTMENT (OUTPATIENT)
Dept: OBGYN | Facility: CLINIC | Age: 28
End: 2023-11-07
Payer: MEDICAID

## 2023-11-07 VITALS
TEMPERATURE: 98.4 F | BODY MASS INDEX: 29.5 KG/M2 | HEART RATE: 92 BPM | DIASTOLIC BLOOD PRESSURE: 60 MMHG | SYSTOLIC BLOOD PRESSURE: 100 MMHG | WEIGHT: 194 LBS | OXYGEN SATURATION: 98 %

## 2023-11-07 PROCEDURE — 0502F SUBSEQUENT PRENATAL CARE: CPT

## 2023-11-13 ENCOUNTER — APPOINTMENT (OUTPATIENT)
Dept: ANTEPARTUM | Facility: CLINIC | Age: 28
End: 2023-11-13
Payer: MEDICAID

## 2023-11-13 ENCOUNTER — ASOB RESULT (OUTPATIENT)
Age: 28
End: 2023-11-13

## 2023-11-13 ENCOUNTER — OUTPATIENT (OUTPATIENT)
Dept: OUTPATIENT SERVICES | Facility: HOSPITAL | Age: 28
LOS: 1 days | End: 2023-11-13
Payer: MEDICAID

## 2023-11-13 DIAGNOSIS — O09.90 SUPERVISION OF HIGH RISK PREGNANCY, UNSPECIFIED, UNSPECIFIED TRIMESTER: ICD-10-CM

## 2023-11-13 DIAGNOSIS — Z34.90 ENCOUNTER FOR SUPERVISION OF NORMAL PREGNANCY, UNSPECIFIED, UNSPECIFIED TRIMESTER: ICD-10-CM

## 2023-11-13 PROCEDURE — 76820 UMBILICAL ARTERY ECHO: CPT | Mod: 26

## 2023-11-13 PROCEDURE — 76817 TRANSVAGINAL US OBSTETRIC: CPT

## 2023-11-13 PROCEDURE — 76820 UMBILICAL ARTERY ECHO: CPT | Mod: 26,59

## 2023-11-13 PROCEDURE — 76821 MIDDLE CEREBRAL ARTERY ECHO: CPT

## 2023-11-13 PROCEDURE — 76812 OB US DETAILED ADDL FETUS: CPT | Mod: 26,59

## 2023-11-13 PROCEDURE — 76820 UMBILICAL ARTERY ECHO: CPT

## 2023-11-13 PROCEDURE — 76812 OB US DETAILED ADDL FETUS: CPT

## 2023-11-13 PROCEDURE — 76817 TRANSVAGINAL US OBSTETRIC: CPT | Mod: 26

## 2023-11-13 PROCEDURE — 76811 OB US DETAILED SNGL FETUS: CPT | Mod: 26

## 2023-11-13 PROCEDURE — 76821 MIDDLE CEREBRAL ARTERY ECHO: CPT | Mod: 26,59

## 2023-11-13 PROCEDURE — 76811 OB US DETAILED SNGL FETUS: CPT

## 2023-11-17 DIAGNOSIS — O43.022 FETUS-TO-FETUS PLACENTAL TRANSFUSION SYNDROME, SECOND TRIMESTER: ICD-10-CM

## 2023-11-17 DIAGNOSIS — O30.032 TWIN PREGNANCY, MONOCHORIONIC/DIAMNIOTIC, SECOND TRIMESTER: ICD-10-CM

## 2023-11-17 DIAGNOSIS — O09.292 SUPERVISION OF PREGNANCY WITH OTHER POOR REPRODUCTIVE OR OBSTETRIC HISTORY, SECOND TRIMESTER: ICD-10-CM

## 2023-11-17 DIAGNOSIS — O40.2XX1 POLYHYDRAMNIOS, SECOND TRIMESTER, FETUS 1: ICD-10-CM

## 2023-11-17 DIAGNOSIS — O35.8XX1 MATERNAL CARE FOR OTHER (SUSPECTED) FETAL ABNORMALITY AND DAMAGE, FETUS 1: ICD-10-CM

## 2023-11-17 DIAGNOSIS — O99.332 SMOKING (TOBACCO) COMPLICATING PREGNANCY, SECOND TRIMESTER: ICD-10-CM

## 2023-11-17 DIAGNOSIS — O35.8XX2 MATERNAL CARE FOR OTHER (SUSPECTED) FETAL ABNORMALITY AND DAMAGE, FETUS 2: ICD-10-CM

## 2023-11-17 DIAGNOSIS — Z3A.20 20 WEEKS GESTATION OF PREGNANCY: ICD-10-CM

## 2023-11-17 DIAGNOSIS — O41.02X2: ICD-10-CM

## 2023-11-20 ENCOUNTER — APPOINTMENT (OUTPATIENT)
Dept: ANTEPARTUM | Facility: CLINIC | Age: 28
End: 2023-11-20
Payer: MEDICAID

## 2023-11-20 ENCOUNTER — ASOB RESULT (OUTPATIENT)
Age: 28
End: 2023-11-20

## 2023-11-20 ENCOUNTER — OUTPATIENT (OUTPATIENT)
Dept: OUTPATIENT SERVICES | Facility: HOSPITAL | Age: 28
LOS: 1 days | End: 2023-11-20
Payer: MEDICAID

## 2023-11-20 DIAGNOSIS — Z34.90 ENCOUNTER FOR SUPERVISION OF NORMAL PREGNANCY, UNSPECIFIED, UNSPECIFIED TRIMESTER: ICD-10-CM

## 2023-11-20 PROCEDURE — 76820 UMBILICAL ARTERY ECHO: CPT | Mod: 26,59

## 2023-11-20 PROCEDURE — 76821 MIDDLE CEREBRAL ARTERY ECHO: CPT | Mod: 26

## 2023-11-20 PROCEDURE — 76815 OB US LIMITED FETUS(S): CPT

## 2023-11-20 PROCEDURE — 76815 OB US LIMITED FETUS(S): CPT | Mod: 26,59

## 2023-11-20 PROCEDURE — 76820 UMBILICAL ARTERY ECHO: CPT

## 2023-11-20 PROCEDURE — 76821 MIDDLE CEREBRAL ARTERY ECHO: CPT | Mod: 26,59

## 2023-11-20 PROCEDURE — 76821 MIDDLE CEREBRAL ARTERY ECHO: CPT

## 2023-11-21 ENCOUNTER — APPOINTMENT (OUTPATIENT)
Dept: OBGYN | Facility: CLINIC | Age: 28
End: 2023-11-21
Payer: MEDICAID

## 2023-11-21 ENCOUNTER — NON-APPOINTMENT (OUTPATIENT)
Age: 28
End: 2023-11-21

## 2023-11-21 VITALS
SYSTOLIC BLOOD PRESSURE: 100 MMHG | DIASTOLIC BLOOD PRESSURE: 60 MMHG | OXYGEN SATURATION: 99 % | TEMPERATURE: 98 F | WEIGHT: 200 LBS | HEART RATE: 90 BPM | BODY MASS INDEX: 30.31 KG/M2 | HEIGHT: 68 IN

## 2023-11-21 PROCEDURE — 0502F SUBSEQUENT PRENATAL CARE: CPT

## 2023-11-22 DIAGNOSIS — O30.032 TWIN PREGNANCY, MONOCHORIONIC/DIAMNIOTIC, SECOND TRIMESTER: ICD-10-CM

## 2023-11-22 DIAGNOSIS — O43.022 FETUS-TO-FETUS PLACENTAL TRANSFUSION SYNDROME, SECOND TRIMESTER: ICD-10-CM

## 2023-11-22 DIAGNOSIS — Z3A.21 21 WEEKS GESTATION OF PREGNANCY: ICD-10-CM

## 2023-11-22 DIAGNOSIS — O99.332 SMOKING (TOBACCO) COMPLICATING PREGNANCY, SECOND TRIMESTER: ICD-10-CM

## 2023-11-22 DIAGNOSIS — O09.292 SUPERVISION OF PREGNANCY WITH OTHER POOR REPRODUCTIVE OR OBSTETRIC HISTORY, SECOND TRIMESTER: ICD-10-CM

## 2023-11-27 ENCOUNTER — APPOINTMENT (OUTPATIENT)
Dept: ANTEPARTUM | Facility: CLINIC | Age: 28
End: 2023-11-27
Payer: MEDICAID

## 2023-11-27 ENCOUNTER — ASOB RESULT (OUTPATIENT)
Age: 28
End: 2023-11-27

## 2023-11-27 ENCOUNTER — OUTPATIENT (OUTPATIENT)
Dept: OUTPATIENT SERVICES | Facility: HOSPITAL | Age: 28
LOS: 1 days | End: 2023-11-27
Payer: MEDICAID

## 2023-11-27 DIAGNOSIS — Z34.90 ENCOUNTER FOR SUPERVISION OF NORMAL PREGNANCY, UNSPECIFIED, UNSPECIFIED TRIMESTER: ICD-10-CM

## 2023-11-27 PROCEDURE — 76820 UMBILICAL ARTERY ECHO: CPT | Mod: 26,59

## 2023-11-27 PROCEDURE — 76821 MIDDLE CEREBRAL ARTERY ECHO: CPT | Mod: 26,59

## 2023-11-27 PROCEDURE — 76820 UMBILICAL ARTERY ECHO: CPT

## 2023-11-27 PROCEDURE — 76816 OB US FOLLOW-UP PER FETUS: CPT

## 2023-11-27 PROCEDURE — 76820 UMBILICAL ARTERY ECHO: CPT | Mod: 26

## 2023-11-27 PROCEDURE — 76816 OB US FOLLOW-UP PER FETUS: CPT | Mod: 26,59

## 2023-11-27 PROCEDURE — 76821 MIDDLE CEREBRAL ARTERY ECHO: CPT

## 2023-11-27 PROCEDURE — 76816 OB US FOLLOW-UP PER FETUS: CPT | Mod: 26

## 2023-11-27 PROCEDURE — 76821 MIDDLE CEREBRAL ARTERY ECHO: CPT | Mod: 26

## 2023-11-28 DIAGNOSIS — Z3A.22 22 WEEKS GESTATION OF PREGNANCY: ICD-10-CM

## 2023-11-28 DIAGNOSIS — O99.332 SMOKING (TOBACCO) COMPLICATING PREGNANCY, SECOND TRIMESTER: ICD-10-CM

## 2023-11-28 DIAGNOSIS — O43.022 FETUS-TO-FETUS PLACENTAL TRANSFUSION SYNDROME, SECOND TRIMESTER: ICD-10-CM

## 2023-11-28 DIAGNOSIS — O09.292 SUPERVISION OF PREGNANCY WITH OTHER POOR REPRODUCTIVE OR OBSTETRIC HISTORY, SECOND TRIMESTER: ICD-10-CM

## 2023-11-28 DIAGNOSIS — O30.032 TWIN PREGNANCY, MONOCHORIONIC/DIAMNIOTIC, SECOND TRIMESTER: ICD-10-CM

## 2023-12-01 ENCOUNTER — APPOINTMENT (OUTPATIENT)
Dept: OBGYN | Facility: CLINIC | Age: 28
End: 2023-12-01
Payer: MEDICAID

## 2023-12-01 VITALS
WEIGHT: 207 LBS | HEART RATE: 95 BPM | BODY MASS INDEX: 31.37 KG/M2 | DIASTOLIC BLOOD PRESSURE: 60 MMHG | TEMPERATURE: 97.9 F | HEIGHT: 68 IN | OXYGEN SATURATION: 97 % | SYSTOLIC BLOOD PRESSURE: 100 MMHG

## 2023-12-01 PROCEDURE — 0502F SUBSEQUENT PRENATAL CARE: CPT

## 2023-12-04 ENCOUNTER — APPOINTMENT (OUTPATIENT)
Dept: ANTEPARTUM | Facility: CLINIC | Age: 28
End: 2023-12-04
Payer: MEDICAID

## 2023-12-04 ENCOUNTER — ASOB RESULT (OUTPATIENT)
Age: 28
End: 2023-12-04

## 2023-12-04 ENCOUNTER — OUTPATIENT (OUTPATIENT)
Dept: OUTPATIENT SERVICES | Facility: HOSPITAL | Age: 28
LOS: 1 days | End: 2023-12-04
Payer: MEDICAID

## 2023-12-04 DIAGNOSIS — Z34.90 ENCOUNTER FOR SUPERVISION OF NORMAL PREGNANCY, UNSPECIFIED, UNSPECIFIED TRIMESTER: ICD-10-CM

## 2023-12-04 PROCEDURE — 76820 UMBILICAL ARTERY ECHO: CPT | Mod: 26,59

## 2023-12-04 PROCEDURE — 76821 MIDDLE CEREBRAL ARTERY ECHO: CPT | Mod: 26,59

## 2023-12-04 PROCEDURE — 76817 TRANSVAGINAL US OBSTETRIC: CPT

## 2023-12-04 PROCEDURE — 76816 OB US FOLLOW-UP PER FETUS: CPT | Mod: 26

## 2023-12-04 PROCEDURE — 76817 TRANSVAGINAL US OBSTETRIC: CPT | Mod: 26

## 2023-12-04 PROCEDURE — 76820 UMBILICAL ARTERY ECHO: CPT

## 2023-12-04 PROCEDURE — 76821 MIDDLE CEREBRAL ARTERY ECHO: CPT

## 2023-12-04 PROCEDURE — 76816 OB US FOLLOW-UP PER FETUS: CPT

## 2023-12-06 DIAGNOSIS — O09.292 SUPERVISION OF PREGNANCY WITH OTHER POOR REPRODUCTIVE OR OBSTETRIC HISTORY, SECOND TRIMESTER: ICD-10-CM

## 2023-12-06 DIAGNOSIS — Z36.3 ENCOUNTER FOR ANTENATAL SCREENING FOR MALFORMATIONS: ICD-10-CM

## 2023-12-06 DIAGNOSIS — O99.332 SMOKING (TOBACCO) COMPLICATING PREGNANCY, SECOND TRIMESTER: ICD-10-CM

## 2023-12-06 DIAGNOSIS — O30.032 TWIN PREGNANCY, MONOCHORIONIC/DIAMNIOTIC, SECOND TRIMESTER: ICD-10-CM

## 2023-12-06 DIAGNOSIS — O43.022 FETUS-TO-FETUS PLACENTAL TRANSFUSION SYNDROME, SECOND TRIMESTER: ICD-10-CM

## 2023-12-06 DIAGNOSIS — Z36.86 ENCOUNTER FOR ANTENATAL SCREENING FOR CERVICAL LENGTH: ICD-10-CM

## 2023-12-06 DIAGNOSIS — Z3A.23 23 WEEKS GESTATION OF PREGNANCY: ICD-10-CM

## 2023-12-11 ENCOUNTER — APPOINTMENT (OUTPATIENT)
Dept: ANTEPARTUM | Facility: CLINIC | Age: 28
End: 2023-12-11

## 2023-12-12 ENCOUNTER — NON-APPOINTMENT (OUTPATIENT)
Age: 28
End: 2023-12-12

## 2023-12-12 ENCOUNTER — APPOINTMENT (OUTPATIENT)
Dept: OBGYN | Facility: CLINIC | Age: 28
End: 2023-12-12
Payer: MEDICAID

## 2023-12-12 VITALS
TEMPERATURE: 97.9 F | BODY MASS INDEX: 31.37 KG/M2 | SYSTOLIC BLOOD PRESSURE: 110 MMHG | DIASTOLIC BLOOD PRESSURE: 60 MMHG | HEIGHT: 68 IN | OXYGEN SATURATION: 98 % | WEIGHT: 207 LBS | HEART RATE: 105 BPM

## 2023-12-12 DIAGNOSIS — Z11.3 ENCOUNTER FOR SCREENING FOR INFECTIONS WITH A PREDOMINANTLY SEXUAL MODE OF TRANSMISSION: ICD-10-CM

## 2023-12-12 DIAGNOSIS — Z13.1 ENCOUNTER FOR SCREENING FOR DIABETES MELLITUS: ICD-10-CM

## 2023-12-12 PROCEDURE — 0502F SUBSEQUENT PRENATAL CARE: CPT

## 2023-12-18 ENCOUNTER — OUTPATIENT (OUTPATIENT)
Dept: OUTPATIENT SERVICES | Facility: HOSPITAL | Age: 28
LOS: 1 days | End: 2023-12-18
Payer: MEDICAID

## 2023-12-18 ENCOUNTER — APPOINTMENT (OUTPATIENT)
Dept: ANTEPARTUM | Facility: CLINIC | Age: 28
End: 2023-12-18
Payer: MEDICAID

## 2023-12-18 ENCOUNTER — ASOB RESULT (OUTPATIENT)
Age: 28
End: 2023-12-18

## 2023-12-18 DIAGNOSIS — Z34.90 ENCOUNTER FOR SUPERVISION OF NORMAL PREGNANCY, UNSPECIFIED, UNSPECIFIED TRIMESTER: ICD-10-CM

## 2023-12-18 PROCEDURE — 76816 OB US FOLLOW-UP PER FETUS: CPT

## 2023-12-18 PROCEDURE — 76816 OB US FOLLOW-UP PER FETUS: CPT | Mod: 26,59

## 2023-12-18 PROCEDURE — 76816 OB US FOLLOW-UP PER FETUS: CPT | Mod: 26

## 2023-12-18 PROCEDURE — 76820 UMBILICAL ARTERY ECHO: CPT

## 2023-12-18 PROCEDURE — 76820 UMBILICAL ARTERY ECHO: CPT | Mod: 26,59

## 2023-12-18 PROCEDURE — 76821 MIDDLE CEREBRAL ARTERY ECHO: CPT | Mod: 26

## 2023-12-18 PROCEDURE — 76819 FETAL BIOPHYS PROFIL W/O NST: CPT | Mod: 26,59

## 2023-12-18 PROCEDURE — 76821 MIDDLE CEREBRAL ARTERY ECHO: CPT

## 2023-12-18 PROCEDURE — 76820 UMBILICAL ARTERY ECHO: CPT | Mod: 26

## 2023-12-18 PROCEDURE — 76819 FETAL BIOPHYS PROFIL W/O NST: CPT

## 2023-12-19 ENCOUNTER — NON-APPOINTMENT (OUTPATIENT)
Age: 28
End: 2023-12-19

## 2023-12-20 ENCOUNTER — LABORATORY RESULT (OUTPATIENT)
Age: 28
End: 2023-12-20

## 2023-12-20 DIAGNOSIS — Z36.3 ENCOUNTER FOR ANTENATAL SCREENING FOR MALFORMATIONS: ICD-10-CM

## 2023-12-20 DIAGNOSIS — Z36.86 ENCOUNTER FOR ANTENATAL SCREENING FOR CERVICAL LENGTH: ICD-10-CM

## 2023-12-20 DIAGNOSIS — O30.032 TWIN PREGNANCY, MONOCHORIONIC/DIAMNIOTIC, SECOND TRIMESTER: ICD-10-CM

## 2023-12-20 DIAGNOSIS — Z3A.25 25 WEEKS GESTATION OF PREGNANCY: ICD-10-CM

## 2023-12-20 DIAGNOSIS — O99.332 SMOKING (TOBACCO) COMPLICATING PREGNANCY, SECOND TRIMESTER: ICD-10-CM

## 2023-12-20 DIAGNOSIS — O43.022 FETUS-TO-FETUS PLACENTAL TRANSFUSION SYNDROME, SECOND TRIMESTER: ICD-10-CM

## 2023-12-20 DIAGNOSIS — O09.292 SUPERVISION OF PREGNANCY WITH OTHER POOR REPRODUCTIVE OR OBSTETRIC HISTORY, SECOND TRIMESTER: ICD-10-CM

## 2023-12-20 LAB — GLUCOSE 1H P 100 G GLC PO SERPL-MCNC: 168 MG/DL

## 2023-12-21 LAB — T PALLIDUM AB SER QL IA: NEGATIVE

## 2023-12-26 ENCOUNTER — APPOINTMENT (OUTPATIENT)
Dept: ANTEPARTUM | Facility: CLINIC | Age: 28
End: 2023-12-26

## 2023-12-26 ENCOUNTER — NON-APPOINTMENT (OUTPATIENT)
Age: 28
End: 2023-12-26

## 2023-12-26 ENCOUNTER — APPOINTMENT (OUTPATIENT)
Dept: OBGYN | Facility: CLINIC | Age: 28
End: 2023-12-26
Payer: MEDICAID

## 2023-12-26 VITALS
DIASTOLIC BLOOD PRESSURE: 60 MMHG | HEIGHT: 68 IN | OXYGEN SATURATION: 98 % | SYSTOLIC BLOOD PRESSURE: 100 MMHG | WEIGHT: 214 LBS | TEMPERATURE: 98 F | HEART RATE: 94 BPM | BODY MASS INDEX: 32.43 KG/M2

## 2023-12-26 DIAGNOSIS — R73.09 OTHER ABNORMAL GLUCOSE: ICD-10-CM

## 2023-12-26 PROCEDURE — 0502F SUBSEQUENT PRENATAL CARE: CPT

## 2023-12-28 ENCOUNTER — INPATIENT (INPATIENT)
Facility: HOSPITAL | Age: 28
LOS: 8 days | Discharge: HOME | DRG: 540 | End: 2024-01-06
Attending: OBSTETRICS & GYNECOLOGY | Admitting: OBSTETRICS & GYNECOLOGY
Payer: MEDICAID

## 2023-12-28 VITALS — SYSTOLIC BLOOD PRESSURE: 125 MMHG | DIASTOLIC BLOOD PRESSURE: 62 MMHG | HEART RATE: 81 BPM

## 2023-12-28 DIAGNOSIS — O26.892 OTHER SPECIFIED PREGNANCY RELATED CONDITIONS, SECOND TRIMESTER: ICD-10-CM

## 2023-12-28 DIAGNOSIS — Z98.890 OTHER SPECIFIED POSTPROCEDURAL STATES: Chronic | ICD-10-CM

## 2023-12-28 DIAGNOSIS — Z3A.27 27 WEEKS GESTATION OF PREGNANCY: ICD-10-CM

## 2023-12-28 DIAGNOSIS — O30.039 TWIN PREGNANCY, MONOCHORIONIC/DIAMNIOTIC, UNSPECIFIED TRIMESTER: ICD-10-CM

## 2023-12-28 DIAGNOSIS — O42.919 PRETERM PREMATURE RUPTURE OF MEMBRANES, UNSPECIFIED AS TO LENGTH OF TIME BETWEEN RUPTURE AND ONSET OF LABOR, UNSPECIFIED TRIMESTER: ICD-10-CM

## 2023-12-28 DIAGNOSIS — O26.899 OTHER SPECIFIED PREGNANCY RELATED CONDITIONS, UNSPECIFIED TRIMESTER: ICD-10-CM

## 2023-12-28 LAB
A1C WITH ESTIMATED AVERAGE GLUCOSE RESULT: 5.6 % — SIGNIFICANT CHANGE UP (ref 4–5.6)
A1C WITH ESTIMATED AVERAGE GLUCOSE RESULT: 5.6 % — SIGNIFICANT CHANGE UP (ref 4–5.6)
ALBUMIN SERPL ELPH-MCNC: 3.7 G/DL — SIGNIFICANT CHANGE UP (ref 3.5–5.2)
ALBUMIN SERPL ELPH-MCNC: 3.7 G/DL — SIGNIFICANT CHANGE UP (ref 3.5–5.2)
ALP SERPL-CCNC: 131 U/L — HIGH (ref 30–115)
ALP SERPL-CCNC: 131 U/L — HIGH (ref 30–115)
ALT FLD-CCNC: 11 U/L — SIGNIFICANT CHANGE UP (ref 0–41)
ALT FLD-CCNC: 11 U/L — SIGNIFICANT CHANGE UP (ref 0–41)
ANION GAP SERPL CALC-SCNC: 12 MMOL/L — SIGNIFICANT CHANGE UP (ref 7–14)
ANION GAP SERPL CALC-SCNC: 12 MMOL/L — SIGNIFICANT CHANGE UP (ref 7–14)
APPEARANCE UR: CLEAR — SIGNIFICANT CHANGE UP
APPEARANCE UR: CLEAR — SIGNIFICANT CHANGE UP
APTT BLD: 30.6 SEC — SIGNIFICANT CHANGE UP (ref 27–39.2)
APTT BLD: 30.6 SEC — SIGNIFICANT CHANGE UP (ref 27–39.2)
AST SERPL-CCNC: 15 U/L — SIGNIFICANT CHANGE UP (ref 0–41)
AST SERPL-CCNC: 15 U/L — SIGNIFICANT CHANGE UP (ref 0–41)
BASOPHILS # BLD AUTO: 0.03 K/UL — SIGNIFICANT CHANGE UP (ref 0–0.2)
BASOPHILS # BLD AUTO: 0.03 K/UL — SIGNIFICANT CHANGE UP (ref 0–0.2)
BASOPHILS # BLD AUTO: 0.04 K/UL — SIGNIFICANT CHANGE UP (ref 0–0.2)
BASOPHILS NFR BLD AUTO: 0.2 % — SIGNIFICANT CHANGE UP (ref 0–1)
BASOPHILS NFR BLD AUTO: 0.2 % — SIGNIFICANT CHANGE UP (ref 0–1)
BASOPHILS NFR BLD AUTO: 0.3 % — SIGNIFICANT CHANGE UP (ref 0–1)
BILIRUB SERPL-MCNC: 0.2 MG/DL — SIGNIFICANT CHANGE UP (ref 0.2–1.2)
BILIRUB SERPL-MCNC: 0.2 MG/DL — SIGNIFICANT CHANGE UP (ref 0.2–1.2)
BILIRUB UR-MCNC: NEGATIVE — SIGNIFICANT CHANGE UP
BILIRUB UR-MCNC: NEGATIVE — SIGNIFICANT CHANGE UP
BLD GP AB SCN SERPL QL: SIGNIFICANT CHANGE UP
BLD GP AB SCN SERPL QL: SIGNIFICANT CHANGE UP
BUN SERPL-MCNC: 7 MG/DL — LOW (ref 10–20)
BUN SERPL-MCNC: 7 MG/DL — LOW (ref 10–20)
C TRACH RRNA SPEC QL NAA+PROBE: SIGNIFICANT CHANGE UP
C TRACH RRNA SPEC QL NAA+PROBE: SIGNIFICANT CHANGE UP
CALCIUM SERPL-MCNC: 8.1 MG/DL — LOW (ref 8.4–10.5)
CALCIUM SERPL-MCNC: 8.1 MG/DL — LOW (ref 8.4–10.5)
CHLORIDE SERPL-SCNC: 103 MMOL/L — SIGNIFICANT CHANGE UP (ref 98–110)
CHLORIDE SERPL-SCNC: 103 MMOL/L — SIGNIFICANT CHANGE UP (ref 98–110)
CO2 SERPL-SCNC: 19 MMOL/L — SIGNIFICANT CHANGE UP (ref 17–32)
CO2 SERPL-SCNC: 19 MMOL/L — SIGNIFICANT CHANGE UP (ref 17–32)
COLOR SPEC: YELLOW — SIGNIFICANT CHANGE UP
COLOR SPEC: YELLOW — SIGNIFICANT CHANGE UP
CREAT SERPL-MCNC: 0.5 MG/DL — LOW (ref 0.7–1.5)
CREAT SERPL-MCNC: 0.5 MG/DL — LOW (ref 0.7–1.5)
DIFF PNL FLD: NEGATIVE — SIGNIFICANT CHANGE UP
DIFF PNL FLD: NEGATIVE — SIGNIFICANT CHANGE UP
EGFR: 131 ML/MIN/1.73M2 — SIGNIFICANT CHANGE UP
EGFR: 131 ML/MIN/1.73M2 — SIGNIFICANT CHANGE UP
EOSINOPHIL # BLD AUTO: 0 K/UL — SIGNIFICANT CHANGE UP (ref 0–0.7)
EOSINOPHIL # BLD AUTO: 0 K/UL — SIGNIFICANT CHANGE UP (ref 0–0.7)
EOSINOPHIL # BLD AUTO: 0.03 K/UL — SIGNIFICANT CHANGE UP (ref 0–0.7)
EOSINOPHIL # BLD AUTO: 0.03 K/UL — SIGNIFICANT CHANGE UP (ref 0–0.7)
EOSINOPHIL # BLD AUTO: 0.3 K/UL — SIGNIFICANT CHANGE UP (ref 0–0.7)
EOSINOPHIL # BLD AUTO: 0.3 K/UL — SIGNIFICANT CHANGE UP (ref 0–0.7)
EOSINOPHIL NFR BLD AUTO: 0 % — SIGNIFICANT CHANGE UP (ref 0–8)
EOSINOPHIL NFR BLD AUTO: 0 % — SIGNIFICANT CHANGE UP (ref 0–8)
EOSINOPHIL NFR BLD AUTO: 0.2 % — SIGNIFICANT CHANGE UP (ref 0–8)
EOSINOPHIL NFR BLD AUTO: 0.2 % — SIGNIFICANT CHANGE UP (ref 0–8)
EOSINOPHIL NFR BLD AUTO: 2.6 % — SIGNIFICANT CHANGE UP (ref 0–8)
EOSINOPHIL NFR BLD AUTO: 2.6 % — SIGNIFICANT CHANGE UP (ref 0–8)
ESTIMATED AVERAGE GLUCOSE: 114 MG/DL — SIGNIFICANT CHANGE UP (ref 68–114)
ESTIMATED AVERAGE GLUCOSE: 114 MG/DL — SIGNIFICANT CHANGE UP (ref 68–114)
FIBRINOGEN PPP-MCNC: 511 MG/DL — HIGH (ref 200–435)
FIBRINOGEN PPP-MCNC: 511 MG/DL — HIGH (ref 200–435)
GLUCOSE BLDC GLUCOMTR-MCNC: 111 MG/DL — HIGH (ref 70–99)
GLUCOSE BLDC GLUCOMTR-MCNC: 111 MG/DL — HIGH (ref 70–99)
GLUCOSE BLDC GLUCOMTR-MCNC: 158 MG/DL — HIGH (ref 70–99)
GLUCOSE BLDC GLUCOMTR-MCNC: 158 MG/DL — HIGH (ref 70–99)
GLUCOSE BLDC GLUCOMTR-MCNC: 192 MG/DL — HIGH (ref 70–99)
GLUCOSE BLDC GLUCOMTR-MCNC: 192 MG/DL — HIGH (ref 70–99)
GLUCOSE SERPL-MCNC: 181 MG/DL — HIGH (ref 70–99)
GLUCOSE SERPL-MCNC: 181 MG/DL — HIGH (ref 70–99)
GLUCOSE UR QL: NEGATIVE MG/DL — SIGNIFICANT CHANGE UP
GLUCOSE UR QL: NEGATIVE MG/DL — SIGNIFICANT CHANGE UP
HCT VFR BLD CALC: 28.7 % — LOW (ref 37–47)
HCT VFR BLD CALC: 28.7 % — LOW (ref 37–47)
HCT VFR BLD CALC: 30.7 % — LOW (ref 37–47)
HCT VFR BLD CALC: 30.7 % — LOW (ref 37–47)
HCT VFR BLD CALC: 30.8 % — LOW (ref 37–47)
HCT VFR BLD CALC: 30.8 % — LOW (ref 37–47)
HGB BLD-MCNC: 10.2 G/DL — LOW (ref 12–16)
HGB BLD-MCNC: 10.2 G/DL — LOW (ref 12–16)
HGB BLD-MCNC: 10.5 G/DL — LOW (ref 12–16)
HGB BLD-MCNC: 10.5 G/DL — LOW (ref 12–16)
HGB BLD-MCNC: 9.6 G/DL — LOW (ref 12–16)
HGB BLD-MCNC: 9.6 G/DL — LOW (ref 12–16)
HIV 1 & 2 AB SERPL IA.RAPID: SIGNIFICANT CHANGE UP
HIV 1 & 2 AB SERPL IA.RAPID: SIGNIFICANT CHANGE UP
IMM GRANULOCYTES NFR BLD AUTO: 1.9 % — HIGH (ref 0.1–0.3)
IMM GRANULOCYTES NFR BLD AUTO: 2.2 % — HIGH (ref 0.1–0.3)
IMM GRANULOCYTES NFR BLD AUTO: 2.2 % — HIGH (ref 0.1–0.3)
INR BLD: 0.94 RATIO — SIGNIFICANT CHANGE UP (ref 0.65–1.3)
INR BLD: 0.94 RATIO — SIGNIFICANT CHANGE UP (ref 0.65–1.3)
KETONES UR-MCNC: NEGATIVE MG/DL — SIGNIFICANT CHANGE UP
KETONES UR-MCNC: NEGATIVE MG/DL — SIGNIFICANT CHANGE UP
LEUKOCYTE ESTERASE UR-ACNC: NEGATIVE — SIGNIFICANT CHANGE UP
LEUKOCYTE ESTERASE UR-ACNC: NEGATIVE — SIGNIFICANT CHANGE UP
LYMPHOCYTES # BLD AUTO: 1.29 K/UL — SIGNIFICANT CHANGE UP (ref 1.2–3.4)
LYMPHOCYTES # BLD AUTO: 1.29 K/UL — SIGNIFICANT CHANGE UP (ref 1.2–3.4)
LYMPHOCYTES # BLD AUTO: 1.32 K/UL — SIGNIFICANT CHANGE UP (ref 1.2–3.4)
LYMPHOCYTES # BLD AUTO: 1.32 K/UL — SIGNIFICANT CHANGE UP (ref 1.2–3.4)
LYMPHOCYTES # BLD AUTO: 17.9 % — LOW (ref 20.5–51.1)
LYMPHOCYTES # BLD AUTO: 17.9 % — LOW (ref 20.5–51.1)
LYMPHOCYTES # BLD AUTO: 2.05 K/UL — SIGNIFICANT CHANGE UP (ref 1.2–3.4)
LYMPHOCYTES # BLD AUTO: 2.05 K/UL — SIGNIFICANT CHANGE UP (ref 1.2–3.4)
LYMPHOCYTES # BLD AUTO: 9.2 % — LOW (ref 20.5–51.1)
LYMPHOCYTES # BLD AUTO: 9.2 % — LOW (ref 20.5–51.1)
LYMPHOCYTES # BLD AUTO: 9.5 % — LOW (ref 20.5–51.1)
LYMPHOCYTES # BLD AUTO: 9.5 % — LOW (ref 20.5–51.1)
MAGNESIUM SERPL-MCNC: 4.3 MG/DL — CRITICAL HIGH (ref 1.8–2.4)
MAGNESIUM SERPL-MCNC: 4.3 MG/DL — CRITICAL HIGH (ref 1.8–2.4)
MAGNESIUM SERPL-MCNC: 4.9 MG/DL — CRITICAL HIGH (ref 1.8–2.4)
MAGNESIUM SERPL-MCNC: 4.9 MG/DL — CRITICAL HIGH (ref 1.8–2.4)
MCHC RBC-ENTMCNC: 27.5 PG — SIGNIFICANT CHANGE UP (ref 27–31)
MCHC RBC-ENTMCNC: 27.5 PG — SIGNIFICANT CHANGE UP (ref 27–31)
MCHC RBC-ENTMCNC: 28.2 PG — SIGNIFICANT CHANGE UP (ref 27–31)
MCHC RBC-ENTMCNC: 28.2 PG — SIGNIFICANT CHANGE UP (ref 27–31)
MCHC RBC-ENTMCNC: 28.5 PG — SIGNIFICANT CHANGE UP (ref 27–31)
MCHC RBC-ENTMCNC: 28.5 PG — SIGNIFICANT CHANGE UP (ref 27–31)
MCHC RBC-ENTMCNC: 33.1 G/DL — SIGNIFICANT CHANGE UP (ref 32–37)
MCHC RBC-ENTMCNC: 33.1 G/DL — SIGNIFICANT CHANGE UP (ref 32–37)
MCHC RBC-ENTMCNC: 33.4 G/DL — SIGNIFICANT CHANGE UP (ref 32–37)
MCHC RBC-ENTMCNC: 33.4 G/DL — SIGNIFICANT CHANGE UP (ref 32–37)
MCHC RBC-ENTMCNC: 34.2 G/DL — SIGNIFICANT CHANGE UP (ref 32–37)
MCHC RBC-ENTMCNC: 34.2 G/DL — SIGNIFICANT CHANGE UP (ref 32–37)
MCV RBC AUTO: 83 FL — SIGNIFICANT CHANGE UP (ref 81–99)
MCV RBC AUTO: 83 FL — SIGNIFICANT CHANGE UP (ref 81–99)
MCV RBC AUTO: 83.4 FL — SIGNIFICANT CHANGE UP (ref 81–99)
MCV RBC AUTO: 83.4 FL — SIGNIFICANT CHANGE UP (ref 81–99)
MCV RBC AUTO: 84.2 FL — SIGNIFICANT CHANGE UP (ref 81–99)
MCV RBC AUTO: 84.2 FL — SIGNIFICANT CHANGE UP (ref 81–99)
MONOCYTES # BLD AUTO: 0.22 K/UL — SIGNIFICANT CHANGE UP (ref 0.1–0.6)
MONOCYTES # BLD AUTO: 0.22 K/UL — SIGNIFICANT CHANGE UP (ref 0.1–0.6)
MONOCYTES # BLD AUTO: 0.44 K/UL — SIGNIFICANT CHANGE UP (ref 0.1–0.6)
MONOCYTES # BLD AUTO: 0.44 K/UL — SIGNIFICANT CHANGE UP (ref 0.1–0.6)
MONOCYTES # BLD AUTO: 0.8 K/UL — HIGH (ref 0.1–0.6)
MONOCYTES # BLD AUTO: 0.8 K/UL — HIGH (ref 0.1–0.6)
MONOCYTES NFR BLD AUTO: 1.6 % — LOW (ref 1.7–9.3)
MONOCYTES NFR BLD AUTO: 1.6 % — LOW (ref 1.7–9.3)
MONOCYTES NFR BLD AUTO: 3.2 % — SIGNIFICANT CHANGE UP (ref 1.7–9.3)
MONOCYTES NFR BLD AUTO: 3.2 % — SIGNIFICANT CHANGE UP (ref 1.7–9.3)
MONOCYTES NFR BLD AUTO: 7 % — SIGNIFICANT CHANGE UP (ref 1.7–9.3)
MONOCYTES NFR BLD AUTO: 7 % — SIGNIFICANT CHANGE UP (ref 1.7–9.3)
N GONORRHOEA RRNA SPEC QL NAA+PROBE: SIGNIFICANT CHANGE UP
N GONORRHOEA RRNA SPEC QL NAA+PROBE: SIGNIFICANT CHANGE UP
NEUTROPHILS # BLD AUTO: 11.8 K/UL — HIGH (ref 1.4–6.5)
NEUTROPHILS # BLD AUTO: 11.8 K/UL — HIGH (ref 1.4–6.5)
NEUTROPHILS # BLD AUTO: 12.19 K/UL — HIGH (ref 1.4–6.5)
NEUTROPHILS # BLD AUTO: 12.19 K/UL — HIGH (ref 1.4–6.5)
NEUTROPHILS # BLD AUTO: 8 K/UL — HIGH (ref 1.4–6.5)
NEUTROPHILS # BLD AUTO: 8 K/UL — HIGH (ref 1.4–6.5)
NEUTROPHILS NFR BLD AUTO: 70 % — SIGNIFICANT CHANGE UP (ref 42.2–75.2)
NEUTROPHILS NFR BLD AUTO: 70 % — SIGNIFICANT CHANGE UP (ref 42.2–75.2)
NEUTROPHILS NFR BLD AUTO: 85.1 % — HIGH (ref 42.2–75.2)
NEUTROPHILS NFR BLD AUTO: 85.1 % — HIGH (ref 42.2–75.2)
NEUTROPHILS NFR BLD AUTO: 86.9 % — HIGH (ref 42.2–75.2)
NEUTROPHILS NFR BLD AUTO: 86.9 % — HIGH (ref 42.2–75.2)
NITRITE UR-MCNC: NEGATIVE — SIGNIFICANT CHANGE UP
NITRITE UR-MCNC: NEGATIVE — SIGNIFICANT CHANGE UP
NRBC # BLD: 0 /100 WBCS — SIGNIFICANT CHANGE UP (ref 0–0)
PH UR: 7 — SIGNIFICANT CHANGE UP (ref 5–8)
PH UR: 7 — SIGNIFICANT CHANGE UP (ref 5–8)
PLATELET # BLD AUTO: 236 K/UL — SIGNIFICANT CHANGE UP (ref 130–400)
PLATELET # BLD AUTO: 236 K/UL — SIGNIFICANT CHANGE UP (ref 130–400)
PLATELET # BLD AUTO: 244 K/UL — SIGNIFICANT CHANGE UP (ref 130–400)
PLATELET # BLD AUTO: 244 K/UL — SIGNIFICANT CHANGE UP (ref 130–400)
PLATELET # BLD AUTO: 253 K/UL — SIGNIFICANT CHANGE UP (ref 130–400)
PLATELET # BLD AUTO: 253 K/UL — SIGNIFICANT CHANGE UP (ref 130–400)
PMV BLD: 10.4 FL — SIGNIFICANT CHANGE UP (ref 7.4–10.4)
PMV BLD: 10.4 FL — SIGNIFICANT CHANGE UP (ref 7.4–10.4)
PMV BLD: 10.5 FL — HIGH (ref 7.4–10.4)
PMV BLD: 10.5 FL — HIGH (ref 7.4–10.4)
PMV BLD: 10.6 FL — HIGH (ref 7.4–10.4)
PMV BLD: 10.6 FL — HIGH (ref 7.4–10.4)
POTASSIUM SERPL-MCNC: 4.2 MMOL/L — SIGNIFICANT CHANGE UP (ref 3.5–5)
POTASSIUM SERPL-MCNC: 4.2 MMOL/L — SIGNIFICANT CHANGE UP (ref 3.5–5)
POTASSIUM SERPL-SCNC: 4.2 MMOL/L — SIGNIFICANT CHANGE UP (ref 3.5–5)
POTASSIUM SERPL-SCNC: 4.2 MMOL/L — SIGNIFICANT CHANGE UP (ref 3.5–5)
PRENATAL SYPHILIS TEST: SIGNIFICANT CHANGE UP
PRENATAL SYPHILIS TEST: SIGNIFICANT CHANGE UP
PROT SERPL-MCNC: 6.5 G/DL — SIGNIFICANT CHANGE UP (ref 6–8)
PROT SERPL-MCNC: 6.5 G/DL — SIGNIFICANT CHANGE UP (ref 6–8)
PROT UR-MCNC: NEGATIVE MG/DL — SIGNIFICANT CHANGE UP
PROT UR-MCNC: NEGATIVE MG/DL — SIGNIFICANT CHANGE UP
PROTHROM AB SERPL-ACNC: 10.7 SEC — SIGNIFICANT CHANGE UP (ref 9.95–12.87)
PROTHROM AB SERPL-ACNC: 10.7 SEC — SIGNIFICANT CHANGE UP (ref 9.95–12.87)
RBC # BLD: 3.41 M/UL — LOW (ref 4.2–5.4)
RBC # BLD: 3.41 M/UL — LOW (ref 4.2–5.4)
RBC # BLD: 3.68 M/UL — LOW (ref 4.2–5.4)
RBC # BLD: 3.68 M/UL — LOW (ref 4.2–5.4)
RBC # BLD: 3.71 M/UL — LOW (ref 4.2–5.4)
RBC # BLD: 3.71 M/UL — LOW (ref 4.2–5.4)
RBC # FLD: 13.4 % — SIGNIFICANT CHANGE UP (ref 11.5–14.5)
RBC # FLD: 13.4 % — SIGNIFICANT CHANGE UP (ref 11.5–14.5)
RBC # FLD: 13.5 % — SIGNIFICANT CHANGE UP (ref 11.5–14.5)
RBC # FLD: 13.5 % — SIGNIFICANT CHANGE UP (ref 11.5–14.5)
RBC # FLD: 13.6 % — SIGNIFICANT CHANGE UP (ref 11.5–14.5)
RBC # FLD: 13.6 % — SIGNIFICANT CHANGE UP (ref 11.5–14.5)
SODIUM SERPL-SCNC: 134 MMOL/L — LOW (ref 135–146)
SODIUM SERPL-SCNC: 134 MMOL/L — LOW (ref 135–146)
SP GR SPEC: 1.01 — SIGNIFICANT CHANGE UP (ref 1–1.03)
SP GR SPEC: 1.01 — SIGNIFICANT CHANGE UP (ref 1–1.03)
SPECIMEN SOURCE: SIGNIFICANT CHANGE UP
SPECIMEN SOURCE: SIGNIFICANT CHANGE UP
UROBILINOGEN FLD QL: 0.2 MG/DL — SIGNIFICANT CHANGE UP (ref 0.2–1)
UROBILINOGEN FLD QL: 0.2 MG/DL — SIGNIFICANT CHANGE UP (ref 0.2–1)
WBC # BLD: 11.44 K/UL — HIGH (ref 4.8–10.8)
WBC # BLD: 11.44 K/UL — HIGH (ref 4.8–10.8)
WBC # BLD: 13.87 K/UL — HIGH (ref 4.8–10.8)
WBC # BLD: 13.87 K/UL — HIGH (ref 4.8–10.8)
WBC # BLD: 14.03 K/UL — HIGH (ref 4.8–10.8)
WBC # BLD: 14.03 K/UL — HIGH (ref 4.8–10.8)
WBC # FLD AUTO: 11.44 K/UL — HIGH (ref 4.8–10.8)
WBC # FLD AUTO: 11.44 K/UL — HIGH (ref 4.8–10.8)
WBC # FLD AUTO: 13.87 K/UL — HIGH (ref 4.8–10.8)
WBC # FLD AUTO: 13.87 K/UL — HIGH (ref 4.8–10.8)
WBC # FLD AUTO: 14.03 K/UL — HIGH (ref 4.8–10.8)
WBC # FLD AUTO: 14.03 K/UL — HIGH (ref 4.8–10.8)

## 2023-12-28 PROCEDURE — 76819 FETAL BIOPHYS PROFIL W/O NST: CPT | Mod: 26

## 2023-12-28 PROCEDURE — 82728 ASSAY OF FERRITIN: CPT

## 2023-12-28 PROCEDURE — 85027 COMPLETE CBC AUTOMATED: CPT

## 2023-12-28 PROCEDURE — 85025 COMPLETE CBC W/AUTO DIFF WBC: CPT

## 2023-12-28 PROCEDURE — 85610 PROTHROMBIN TIME: CPT

## 2023-12-28 PROCEDURE — 81003 URINALYSIS AUTO W/O SCOPE: CPT

## 2023-12-28 PROCEDURE — 83735 ASSAY OF MAGNESIUM: CPT

## 2023-12-28 PROCEDURE — 87591 N.GONORRHOEAE DNA AMP PROB: CPT

## 2023-12-28 PROCEDURE — 85384 FIBRINOGEN ACTIVITY: CPT

## 2023-12-28 PROCEDURE — 36415 COLL VENOUS BLD VENIPUNCTURE: CPT

## 2023-12-28 PROCEDURE — 87491 CHLMYD TRACH DNA AMP PROBE: CPT

## 2023-12-28 PROCEDURE — 86592 SYPHILIS TEST NON-TREP QUAL: CPT

## 2023-12-28 PROCEDURE — 86923 COMPATIBILITY TEST ELECTRIC: CPT

## 2023-12-28 PROCEDURE — 87086 URINE CULTURE/COLONY COUNT: CPT

## 2023-12-28 PROCEDURE — 76821 MIDDLE CEREBRAL ARTERY ECHO: CPT | Mod: 26

## 2023-12-28 PROCEDURE — 59025 FETAL NON-STRESS TEST: CPT

## 2023-12-28 PROCEDURE — P9040: CPT

## 2023-12-28 PROCEDURE — 83605 ASSAY OF LACTIC ACID: CPT

## 2023-12-28 PROCEDURE — 36430 TRANSFUSION BLD/BLD COMPNT: CPT

## 2023-12-28 PROCEDURE — 86901 BLOOD TYPING SEROLOGIC RH(D): CPT

## 2023-12-28 PROCEDURE — 83550 IRON BINDING TEST: CPT

## 2023-12-28 PROCEDURE — 82962 GLUCOSE BLOOD TEST: CPT

## 2023-12-28 PROCEDURE — 80053 COMPREHEN METABOLIC PANEL: CPT

## 2023-12-28 PROCEDURE — 83036 HEMOGLOBIN GLYCOSYLATED A1C: CPT

## 2023-12-28 PROCEDURE — 86900 BLOOD TYPING SEROLOGIC ABO: CPT

## 2023-12-28 PROCEDURE — 88307 TISSUE EXAM BY PATHOLOGIST: CPT

## 2023-12-28 PROCEDURE — 83540 ASSAY OF IRON: CPT

## 2023-12-28 PROCEDURE — 76815 OB US LIMITED FETUS(S): CPT | Mod: 26,59

## 2023-12-28 PROCEDURE — 86850 RBC ANTIBODY SCREEN: CPT

## 2023-12-28 PROCEDURE — 76820 UMBILICAL ARTERY ECHO: CPT | Mod: 26

## 2023-12-28 PROCEDURE — 82607 VITAMIN B-12: CPT

## 2023-12-28 PROCEDURE — 87653 STREP B DNA AMP PROBE: CPT

## 2023-12-28 PROCEDURE — 99222 1ST HOSP IP/OBS MODERATE 55: CPT | Mod: GC

## 2023-12-28 PROCEDURE — 99221 1ST HOSP IP/OBS SF/LOW 40: CPT

## 2023-12-28 PROCEDURE — 82746 ASSAY OF FOLIC ACID SERUM: CPT

## 2023-12-28 PROCEDURE — 85730 THROMBOPLASTIN TIME PARTIAL: CPT

## 2023-12-28 PROCEDURE — 86703 HIV-1/HIV-2 1 RESULT ANTBDY: CPT

## 2023-12-28 RX ORDER — FAMOTIDINE 10 MG/ML
20 INJECTION INTRAVENOUS DAILY
Refills: 0 | Status: DISCONTINUED | OUTPATIENT
Start: 2023-12-28 | End: 2023-12-30

## 2023-12-28 RX ORDER — MAGNESIUM SULFATE 500 MG/ML
2 VIAL (ML) INJECTION
Qty: 40 | Refills: 0 | Status: DISCONTINUED | OUTPATIENT
Start: 2023-12-28 | End: 2023-12-29

## 2023-12-28 RX ORDER — MAGNESIUM SULFATE 500 MG/ML
4 VIAL (ML) INJECTION ONCE
Refills: 0 | Status: COMPLETED | OUTPATIENT
Start: 2023-12-28 | End: 2023-12-28

## 2023-12-28 RX ORDER — AMPICILLIN TRIHYDRATE 250 MG
CAPSULE ORAL
Refills: 0 | Status: DISCONTINUED | OUTPATIENT
Start: 2023-12-28 | End: 2023-12-29

## 2023-12-28 RX ORDER — AMPICILLIN TRIHYDRATE 250 MG
2 CAPSULE ORAL ONCE
Refills: 0 | Status: COMPLETED | OUTPATIENT
Start: 2023-12-28 | End: 2023-12-28

## 2023-12-28 RX ORDER — SODIUM CHLORIDE 9 MG/ML
1000 INJECTION, SOLUTION INTRAVENOUS
Refills: 0 | Status: DISCONTINUED | OUTPATIENT
Start: 2023-12-28 | End: 2023-12-29

## 2023-12-28 RX ORDER — AZITHROMYCIN 500 MG/1
1000 TABLET, FILM COATED ORAL ONCE
Refills: 0 | Status: COMPLETED | OUTPATIENT
Start: 2023-12-28 | End: 2023-12-28

## 2023-12-28 RX ORDER — AMPICILLIN TRIHYDRATE 250 MG
2 CAPSULE ORAL EVERY 6 HOURS
Refills: 0 | Status: DISCONTINUED | OUTPATIENT
Start: 2023-12-28 | End: 2023-12-29

## 2023-12-28 RX ORDER — INFLUENZA VIRUS VACCINE 15; 15; 15; 15 UG/.5ML; UG/.5ML; UG/.5ML; UG/.5ML
0.5 SUSPENSION INTRAMUSCULAR ONCE
Refills: 0 | Status: COMPLETED | OUTPATIENT
Start: 2023-12-28 | End: 2023-12-28

## 2023-12-28 RX ORDER — AMOXICILLIN 250 MG/5ML
500 SUSPENSION, RECONSTITUTED, ORAL (ML) ORAL EVERY 8 HOURS
Refills: 0 | Status: COMPLETED | OUTPATIENT
Start: 2023-12-28

## 2023-12-28 RX ORDER — INSULIN LISPRO 100/ML
4 VIAL (ML) SUBCUTANEOUS ONCE
Refills: 0 | Status: COMPLETED | OUTPATIENT
Start: 2023-12-28 | End: 2023-12-28

## 2023-12-28 RX ADMIN — Medication 100 GRAM(S): at 12:06

## 2023-12-28 RX ADMIN — Medication 100 GRAM(S): at 03:35

## 2023-12-28 RX ADMIN — Medication 12 MILLIGRAM(S): at 04:04

## 2023-12-28 RX ADMIN — Medication 100 GRAM(S): at 18:10

## 2023-12-28 RX ADMIN — Medication 100 GRAM(S): at 23:56

## 2023-12-28 RX ADMIN — Medication 300 GRAM(S): at 03:53

## 2023-12-28 RX ADMIN — FAMOTIDINE 104 MILLIGRAM(S): 10 INJECTION INTRAVENOUS at 05:38

## 2023-12-28 RX ADMIN — Medication 4 UNIT(S): at 20:10

## 2023-12-28 RX ADMIN — SODIUM CHLORIDE 75 MILLILITER(S): 9 INJECTION, SOLUTION INTRAVENOUS at 03:37

## 2023-12-28 RX ADMIN — Medication 1 TABLET(S): at 18:23

## 2023-12-28 RX ADMIN — AZITHROMYCIN 1000 MILLIGRAM(S): 500 TABLET, FILM COATED ORAL at 04:23

## 2023-12-28 RX ADMIN — Medication 50 GM/HR: at 04:18

## 2023-12-28 NOTE — PROGRESS NOTE ADULT - SUBJECTIVE AND OBJECTIVE BOX
PGY1 Magnesium Note     Subjective:   Pt evaluated at bedside for magnesium check. She denies headache, vision changes, dizziness, SOB, chest pain, RUQ/epigastric pain.    Objective:   T(C): 36.6 (23 @ 16:18), Max: 36.8 (23 @ 01:16)  T(F): 97.88 (23 @ 16:18), Max: 98.2 (23 @ 01:16)  HR: 78 (23 @ 21:45) (67 - 88)  BP: 105/58 (23 @ 21:45) (83/48 - 125/62)  RR: 18 (23 @ 06:53) (16 - 18)  SpO2: 92% (23 @ 06:47) (90% - 99%)    I&O's Summary    27 Dec 2023 07:  -  28 Dec 2023 07:00  --------------------------------------------------------  IN: 375 mL / OUT: 700 mL / NET: -325 mL    28 Dec 2023 07:  -  28 Dec 2023 22:30  --------------------------------------------------------  IN: 1125 mL / OUT: 2400 mL / NET: -1275 mL    Gen: NAD, AAOx3  CV: S1S2, RRR, no M/R/G  Pulm: CTAB, no rhonchi or rales or wheezing  Ext: no calf tenderness or edema SCDs in place  Neuro: 2+ DTR bilaterally  Abd: soft, gravid, nontender, no rebound or guarding, no epigastric tenderness      EFM: A: 125/mod/+accels, B: 125/mod/+accels  [place]: not evonne  SVE: deferred    Medications:  ampicillin  IVPB: 100 (23 @ 03:35)  ampicillin  IVPB: 100 (23 @ 18:10),  100 (23 @ 12:06)  azithromycin   Tablet: 1000 (23 @ 04:23)  betamethasone Injectable: 12 (23 @ 04:04)  famotidine  IVPB: 104 (23 @ 05:38)  insulin lispro Injectable (ADMELOG).: 4 (23 @ 20:10)  lactated ringers.: 75 (23 @ 03:35)  magnesium sulfate  IVPB: 300 (23 @ 03:53)  magnesium sulfate Infusion: 50 (23 @ 03:00)  prenatal multivitamin: 1 (23 @ 18:23)      Labs:                         9.6    13.87 )-----------( 244      ( 28 Dec 2023 18:59 )             28.7       134<L>  |  103  |  7<L>  ----------------------------<  181<H>  4.2   |  19  |  0.5<L>    Ca    8.1<L>      28 Dec 2023 09:30  Mg     4.9         TPro  6.5  /  Alb  3.7  /  TBili  0.2  /  DBili  x   /  AST  15  /  ALT  11  /  AlkPhos  131<H>        Urinalysis Basic - ( 28 Dec 2023 10:41 )    Color: Yellow / Appearance: Clear / S.009 / pH: x  Gluc: x / Ketone: Negative mg/dL  / Bili: Negative / Urobili: 0.2 mg/dL   Blood: x / Protein: Negative mg/dL / Nitrite: Negative   Leuk Esterase: Negative / RBC: x / WBC x   Sq Epi: x / Non Sq Epi: x / Bacteria: x         PGY1 Magnesium Note     Subjective:   Pt evaluated at bedside for magnesium check. She denies headache, vision changes, dizziness, SOB, chest pain, RUQ/epigastric pain.    Objective:   T(C): 36.6 (23 @ 16:18), Max: 36.8 (23 @ 01:16)  T(F): 97.88 (23 @ 16:18), Max: 98.2 (23 @ 01:16)  HR: 78 (23 @ 21:45) (67 - 88)  BP: 105/58 (23 @ 21:45) (83/48 - 125/62)  RR: 18 (23 @ 06:53) (16 - 18)  SpO2: 92% (23 @ 06:47) (90% - 99%)    I&O's Summary    27 Dec 2023 07:  -  28 Dec 2023 07:00  --------------------------------------------------------  IN: 375 mL / OUT: 700 mL / NET: -325 mL    28 Dec 2023 07:  -  28 Dec 2023 22:30  --------------------------------------------------------  IN: 1125 mL / OUT: 2400 mL / NET: -1275 mL    Gen: NAD, AAOx3  CV: S1S2, RRR, no M/R/G  Pulm: CTAB, no rhonchi or rales or wheezing  Ext: no calf tenderness or edema SCDs in place  Neuro: 2+ DTR bilaterally  Abd: soft, gravid, nontender, no rebound or guarding, no epigastric tenderness      EFM: A: 125/mod/+accels, B: 125/mod/+accels  [place]: not evonne  SVE: deferred    Medications:  ampicillin  IVPB: 100 (23 @ 03:35)  ampicillin  IVPB: 100 (23 @ 18:10),  100 (23 @ 12:06)  azithromycin   Tablet: 1000 (23 @ 04:23)  betamethasone Injectable: 12 (23 @ 04:04)  famotidine  IVPB: 104 (23 @ 05:38)  insulin lispro Injectable (ADMELOG).: 4 (23 @ 20:10)  lactated ringers.: 75 (23 @ 03:35)  magnesium sulfate  IVPB: 300 (23 @ 03:53)  magnesium sulfate Infusion: 50 (23 @ 03:00)  prenatal multivitamin: 1 (23 @ 18:23)      Labs:                         9.6    13.87 )-----------( 244      ( 28 Dec 2023 18:59 )             28.7       134<L>  |  103  |  7<L>  ----------------------------<  181<H>  4.2   |  19  |  0.5<L>    Ca    8.1<L>      28 Dec 2023 09:30  Mg     4.9         TPro  6.5  /  Alb  3.7  /  TBili  0.2  /  DBili  x   /  AST  15  /  ALT  11  /  AlkPhos  131<H>        Urinalysis Basic - ( 28 Dec 2023 10:41 )    Color: Yellow / Appearance: Clear / S.009 / pH: x  Gluc: x / Ketone: Negative mg/dL  / Bili: Negative / Urobili: 0.2 mg/dL   Blood: x / Protein: Negative mg/dL / Nitrite: Negative   Leuk Esterase: Negative / RBC: x / WBC x   Sq Epi: x / Non Sq Epi: x / Bacteria: x

## 2023-12-28 NOTE — OB PROVIDER H&P - ASSESSMENT
29yo  at 27w0d, monochorionic/diamniotic twin pregnancy, glucose intolerance, admitted for PPROM.   #Admission  -Admit to L+D  -Monitor EFM and TOCO   -IVF and admission labs  -Pain control PRN  -Clear liquid diet as tolerated  -Monitor vitals    #PPROM @27wks  -Ordered Latency antibiotics   -Ordered Betamethasone x2 q24hrs for fetal lung maturity  -Ordered Magnesium for fetal neuroprotection  -MFM consulted  -NICU consulted  - f/u GC/CT and GBS     #Glucose intolerance  - FS q4hrs  - Will need 3hr GTT inpatient 27yo  at 27w0d, monochorionic/diamniotic twin pregnancy, glucose intolerance, admitted for PPROM.   #Admission  -Admit to L+D  -Monitor EFM and TOCO   -IVF and admission labs  -Pain control PRN  -Clear liquid diet as tolerated  -Monitor vitals    #PPROM @27wks  -Ordered Latency antibiotics   -Ordered Betamethasone x2 q24hrs for fetal lung maturity  -Ordered Magnesium for fetal neuroprotection  -MFM consulted  -NICU consulted  - f/u GC/CT and GBS     #Glucose intolerance  - FS q4hrs  - Will need 3hr GTT inpatient

## 2023-12-28 NOTE — PROGRESS NOTE ADULT - ASSESSMENT
29yo  at 27w0d, GBS unknown, mono-di twins with TTTS s/p laser surgery at [place], PPROM, with reassuring maternal and fetal status at this time.     # PPROM,  @2100  - Latency antibiotics, ampicillin 2g q6hr x48hr, azithro 1g x1, followed by 500mg amoxacillin q8hr x5d  - Betamethasone x2 doses over 24hr for fetal lung maturity  - Magnesium for neuroprotection until completion of betamethasone course  - Monitor for s/s of chorioamnionitis  - Next set of labs at 1800    # [place]-Di Twins, variable lie  - S/p laser surgery at [facility] at 16wk GA, TTTS now resolved  - 25w4d A- 730g 12%, B- 705g 8%; discordance 3%. Normal AFV, Dopplers, BPP x2.   - Fetal echos at [place], normal x2  - 26w5d: Cx Yahir 4.0 cm. Twin A: EFW 1'13", fh 140, mvp 4.7 cm. Twin B: EFW 1'14", fh 147, mvp 3.8 cm.    # Pregnancy  - Elevated GCT, will complete GTT after course of betamethasone  - Cont efm/toco.   - Vitals.  - PNVs  - Reg diet  - NICU consult ordered and called  - F/u Gc/Ct, GBS, UCx,      29yo  at 27w0d, GBS unknown, mono-di twins with TTTS s/p laser surgery at [place], PPROM, with reassuring maternal and fetal status at this time.     # PPROM,  @2100  - Latency antibiotics, ampicillin 2g q6hr x48hr, azithro 1g x1, followed by 500mg amoxacillin q8hr x5d  - Betamethasone x2 doses over 24hr for fetal lung maturity  - Magnesium for neuroprotection until completion of betamethasone course  - Monitor for s/s of chorioamnionitis  - Next set of labs at 1800    # [place]-Di Twins, variable lie  - S/p laser surgery at [facility] at 16wk GA, TTTS now resolved  - 25w4d A- 730g 12%, B- 705g 8%; discordance 3%. Normal AFV, Dopplers, BPP x2.   - Fetal echos at [facility], normal x2  - 26w5d: Cx Yahir 4.0 cm. Twin A: EFW 1'13", fh 140, mvp 4.7 cm. Twin B: EFW 1'14", fh 147, mvp 3.8 cm.    # Pregnancy  - Elevated GCT, will complete GTT after course of betamethasone  - Cont efm/toco.   - Vitals.  - PNVs  - Reg diet  - NICU consult ordered and called  - F/u Gc/Ct, GBS, UCx,

## 2023-12-28 NOTE — OB RN PATIENT PROFILE - FALL HARM RISK - UNIVERSAL INTERVENTIONS
Bed in lowest position, wheels locked, appropriate side rails in place/Call bell, personal items and telephone in reach/Instruct patient to call for assistance before getting out of bed or chair/Non-slip footwear when patient is out of bed/[place] to call system/Physically safe environment - no spills, clutter or unnecessary equipment/Purposeful Proactive Rounding/Room/bathroom lighting operational, light cord in reach Bed in lowest position, wheels locked, appropriate side rails in place/Call bell, personal items and telephone in reach/Instruct patient to call for assistance before getting out of bed or chair/Non-slip footwear when patient is out of bed/[place] to call system/Physically safe environment - no spills, clutter or unnecessary equipment/Purposeful Proactive Rounding/Room/bathroom lighting operational, light cord in reach

## 2023-12-28 NOTE — PROGRESS NOTE ADULT - SUBJECTIVE AND OBJECTIVE BOX
Pt evaluated at bedside for magnesium check. She denies headache, vision changes, dizziness, SOB, chest pain, RUQ/epigastric pain. Continues to endorse leakage of fluid, denies any abdominal pain, contractions, fever, or chills.    Objective:   Vital Signs (24 Hrs):  T(C): 36.6 (23 @ 06:53), Max: 36.8 (23 @ 01:16)  HR: 86 (23 @ 15:19) (67 - 88)  BP: 103/56 (23 @ 15:19) (83/48 - 125/62)  RR: 18 (23 @ 06:53) (16 - 18)  SpO2: 92% (23 @ 06:47) (90% - 99%)      Gen: NAD, AAOx3  CV: S1S2, RRR, no M/R/G  Pulm: CTAB  Ext: no calf tenderness or edema SCDs in place  Neuro: 2+ DTR bilaterally  Abd: soft, gravid, nontender, no rebound or guarding, no epigastric tenderness      EFM:   Twin A:  140/mod variability/accels +  Twin B: 140/mod variability/accels +  [place]: no contractions  SVE:  deferred            Medications:  (ADM OVERRIDE): 1 (23 @ 03:03)    ampicillin  IVPB: 100 (23 @ 03:35)  azithromycin   Tablet: 1000 (23 @ 04:23)  betamethasone Injectable: 12 (23 @ 04:04)  famotidine  IVPB: 104 (23 @ 05:38)  lactated ringers.: 75 (23 @ 03:35)  magnesium sulfate  IVPB: 300 (23 @ 03:53)  magnesium sulfate Infusion: 50 (23 @ 03:00)      LABS:                          10.5   14.03 )-----------( 236      ( 28 Dec 2023 09:30 )             30.7         134<L>  |  103  |  7<L>  ----------------------------<  181<H>  4.2   |  19  |  0.5<L>    Ca    8.1<L>      28 Dec 2023 09:30  Mg     4.3         TPro  6.5  /  Alb  3.7  /  TBili  0.2  /  DBili  x   /  AST  15  /  ALT  11  /  AlkPhos  131<H>      LIVER FUNCTIONS - ( 28 Dec 2023 09:30 )  Alb: 3.7 g/dL / Pro: 6.5 g/dL / ALK PHOS: 131 U/L / ALT: 11 U/L / AST: 15 U/L / GGT: x           PT/INR - ( 28 Dec 2023 04:18 )   PT: 10.70 sec;   INR: 0.94 ratio         PTT - ( 28 Dec 2023 04:18 )  PTT:30.6 sec  Urinalysis Basic - ( 28 Dec 2023 10:41 )    Color: Yellow / Appearance: Clear / S.009 / pH: x  Gluc: x / Ketone: Negative mg/dL  / Bili: Negative / Urobili: 0.2 mg/dL   Blood: x / Protein: Negative mg/dL / Nitrite: Negative   Leuk Esterase: Negative / RBC: x / WBC x   Sq Epi: x / Non Sq Epi: x / Bacteria: x                        Assessment and Plan:   · Assessment	  27yo  at 27w0d, GBS unknown, mono-di twins with TTTS s/p laser surgery at [place], PPROM, with reassuring maternal and fetal status at this time.     # PPROM,  @2100  - Latency antibiotics, ampicillin 2g q6hr x48hr, azithro 1g x1, followed by 500mg amoxacillin q8hr x5d  - Betamethasone x2 doses over 24hr for fetal lung maturity  - Magnesium for neuroprotection until completion of betamethasone course  - Monitor for s/s of chorioamnionitis  - Next set of labs at 1800    # [place]-Di Twins, variable lie  - S/p laser surgery at [facility] at 16wk GA, TTTS now resolved  - 25w4d A- 730g 12%, B- 705g 8%; discordance 3%. Normal AFV, Dopplers, BPP x2.   - Fetal echos at [place], normal x2  - 26w5d: Cx Yahir 4.0 cm. Twin A: EFW 1'13", fh 140, mvp 4.7 cm. Twin B: EFW 1'14", fh 147, mvp 3.8 cm.    # Pregnancy  - Elevated GCT, will complete GTT after course of betamethasone  - Cont efm/toco.   - Vitals.  - PNVs  - Reg diet  - NICU consult ordered and called  - F/u Gc/Ct, GBS, UCx,        Pt evaluated at bedside for magnesium check. She denies headache, vision changes, dizziness, SOB, chest pain, RUQ/epigastric pain. Continues to endorse leakage of fluid, denies any abdominal pain, contractions, fever, or chills.    Objective:   Vital Signs (24 Hrs):  T(C): 36.6 (23 @ 06:53), Max: 36.8 (23 @ 01:16)  HR: 86 (23 @ 15:19) (67 - 88)  BP: 103/56 (23 @ 15:19) (83/48 - 125/62)  RR: 18 (23 @ 06:53) (16 - 18)  SpO2: 92% (23 @ 06:47) (90% - 99%)      Gen: NAD, AAOx3  CV: S1S2, RRR, no M/R/G  Pulm: CTAB  Ext: no calf tenderness or edema SCDs in place  Neuro: 2+ DTR bilaterally  Abd: soft, gravid, nontender, no rebound or guarding, no epigastric tenderness      EFM:   Twin A:  140/mod variability/accels +  Twin B: 140/mod variability/accels +  [place]: no contractions  SVE:  deferred            Medications:  (ADM OVERRIDE): 1 (23 @ 03:03)    ampicillin  IVPB: 100 (23 @ 03:35)  azithromycin   Tablet: 1000 (23 @ 04:23)  betamethasone Injectable: 12 (23 @ 04:04)  famotidine  IVPB: 104 (23 @ 05:38)  lactated ringers.: 75 (23 @ 03:35)  magnesium sulfate  IVPB: 300 (23 @ 03:53)  magnesium sulfate Infusion: 50 (23 @ 03:00)      LABS:                          10.5   14.03 )-----------( 236      ( 28 Dec 2023 09:30 )             30.7         134<L>  |  103  |  7<L>  ----------------------------<  181<H>  4.2   |  19  |  0.5<L>    Ca    8.1<L>      28 Dec 2023 09:30  Mg     4.3         TPro  6.5  /  Alb  3.7  /  TBili  0.2  /  DBili  x   /  AST  15  /  ALT  11  /  AlkPhos  131<H>      LIVER FUNCTIONS - ( 28 Dec 2023 09:30 )  Alb: 3.7 g/dL / Pro: 6.5 g/dL / ALK PHOS: 131 U/L / ALT: 11 U/L / AST: 15 U/L / GGT: x           PT/INR - ( 28 Dec 2023 04:18 )   PT: 10.70 sec;   INR: 0.94 ratio         PTT - ( 28 Dec 2023 04:18 )  PTT:30.6 sec  Urinalysis Basic - ( 28 Dec 2023 10:41 )    Color: Yellow / Appearance: Clear / S.009 / pH: x  Gluc: x / Ketone: Negative mg/dL  / Bili: Negative / Urobili: 0.2 mg/dL   Blood: x / Protein: Negative mg/dL / Nitrite: Negative   Leuk Esterase: Negative / RBC: x / WBC x   Sq Epi: x / Non Sq Epi: x / Bacteria: x                        Assessment and Plan:   · Assessment	  27yo  at 27w0d, GBS unknown, mono-di twins with TTTS s/p laser surgery at [place], PPROM, with reassuring maternal and fetal status at this time.     # PPROM,  @2100  - Latency antibiotics, ampicillin 2g q6hr x48hr, azithro 1g x1, followed by 500mg amoxacillin q8hr x5d  - Betamethasone x2 doses over 24hr for fetal lung maturity  - Magnesium for neuroprotection until completion of betamethasone course  - Monitor for s/s of chorioamnionitis  - Next set of labs at 1800    # [place]-Di Twins, variable lie  - S/p laser surgery at [facility] at 16wk GA, TTTS now resolved  - 25w4d A- 730g 12%, B- 705g 8%; discordance 3%. Normal AFV, Dopplers, BPP x2.   - Fetal echos at [place], normal x2  - 26w5d: Cx Yahir 4.0 cm. Twin A: EFW 1'13", fh 140, mvp 4.7 cm. Twin B: EFW 1'14", fh 147, mvp 3.8 cm.    # Pregnancy  - Elevated GCT, will complete GTT after course of betamethasone  - Cont efm/toco.   - Vitals.  - PNVs  - Reg diet  - NICU consult ordered and called  - F/u Gc/Ct, GBS, UCx,

## 2023-12-28 NOTE — PROGRESS NOTE ADULT - ASSESSMENT
27yo  at 27w0d, GBS unknown, mono-di twins with TTTS s/p laser surgery at [place], PPROM, on magnesium for neuroprotection.     # PPROM,  @2100  - Latency antibiotics, ampicillin 2g q6hr x48hr, azithro 1g x1, followed by 500mg amoxacillin q8hr x5d  - Betamethasone x2 doses over 24hr for fetal lung maturity  - Magnesium for neuroprotection until completion of betamethasone course  - Monitor for s/s of chorioamnionitis    # Mono-Di Twins, variable lie  - S/p laser surgery at [facility] at 16wk GA, TTTS now resolved  - 25w4d A- 730g 12%, B- 705g 8%; discordance 3%. Normal AFV, Dopplers, BPP x2.   - Fetal echos at [place], normal x2  - 26w5d: Cx Yahir 4.0 cm. Twin A: EFW 1'13", fh 140, mvp 4.7 cm. Twin B: EFW 1'14", fh 147, mvp 3.8 cm.    # Pregnancy  - Elevated GCT, will complete GTT after course of betamethasone  - Cont efm/toco.   - Vitals.  - Reg diet  - neuro checks q2hrs  - next CBC and mag @0100 27yo  at 27w0d, GBS unknown, mono-di twins with TTTS s/p laser surgery at [place], PPROM, on magnesium for neuroprotection.     # PPROM,  @2100  - Latency antibiotics, ampicillin 2g q6hr x48hr, azithro 1g x1, followed by 500mg amoxacillin q8hr x5d  - Betamethasone x2 doses over 24hr for fetal lung maturity  - Magnesium for neuroprotection until completion of betamethasone course  - Monitor for s/s of chorioamnionitis    # Mono-Di Twins, variable lie  - S/p laser surgery at [facility] at 16wk GA, TTTS now resolved  - 25w4d A- 730g 12%, B- 705g 8%; discordance 3%. Normal AFV, Dopplers, BPP x2.   - Fetal echos at [place], normal x2  - 26w5d: Cx Yahir 4.0 cm. Twin A: EFW 1'13", fh 140, mvp 4.7 cm. Twin B: EFW 1'14", fh 147, mvp 3.8 cm.    # Pregnancy  - Elevated GCT, will complete GTT after course of betamethasone  - Cont efm/toco.   - Vitals.  - Reg diet  - neuro checks q2hrs  - next CBC and mag @0100

## 2023-12-28 NOTE — CONSULT NOTE ADULT - TIME BILLING
Complex presentation of 27yo  at 27 weeks gestation with twin pregnancy with multiple comorbidities, now with pregnancy complicated by  rupture of membranes. lisab

## 2023-12-28 NOTE — OB PROVIDER H&P - NSHPPHYSICALEXAM_GEN_ALL_CORE
T(C): 36.8 (12-28-23 @ 01:16), Max: 36.8 (12-28-23 @ 01:16)  HR: 81 (12-28-23 @ 01:16) (81 - 81)  BP: 125/62 (12-28-23 @ 01:16) (125/62 - 125/62)  RR: 17 (12-28-23 @ 01:16) (17 - 17)      Gen: A+OX3. NAD  Abd: Soft, Nontender. Gravid.  SVE:  deferred  Spec: minimal fluid noted in vaginal fornix, no bleeding, cervix appears 0.5cm dilated, nitrazine positive, ferning positive   BSS:   - Twin A: vtx, maternal left, posterior placenta, mvp 4.5cm, bpp 8/8  - Twin B: vtx, maternal right, posterior placenta, mvp 4.3cm, bpp 8/8    FHR:   - Twin A: 155/mod/no accel  - Twin B: 145/mod/+accel  TOCO: none

## 2023-12-28 NOTE — PROGRESS NOTE ADULT - ASSESSMENT
27yo  at 27w0d, GBS unknown, mono-di twins with TTTS s/p laser surgery at [place], PPROM, with reassuring maternal and fetal status at this time.     # PPROM,  @2100  - Latency antibiotics, ampicillin 2g q6hr x48hr, azithro 1g x1, followed by 500mg amoxacillin q8hr x5d  - Betamethasone x2 doses over 24hr for fetal lung maturity  - Magnesium for neuroprotection until completion of betamethasone course  - Monitor for s/s of chorioamnionitis  - next labs 0100    # Mono-Di Twins, variable lie  - S/p laser surgery at [facility] at 16wk GA, TTTS now resolved  - 25w4d A- 730g 12%, B- 705g 8%; discordance 3%. Normal AFV, Dopplers, BPP x2.   - Fetal echos at [place], normal x2  - 26w5d: Cx Yahir 4.0 cm. Twin A: EFW 1'13", fh 140, mvp 4.7 cm. Twin B: EFW 1'14", fh 147, mvp 3.8 cm.    # Pregnancy  - Elevated GCT, will complete GTT after course of betamethasone  - Cont efm/toco.   - Vitals.  - PNVs  - Reg diet  - NICU consult ordered and called  - F/u Gc/Ct, GBS, UCx   27yo  at 27w0d, GBS unknown, mono-di twins with TTTS s/p laser surgery at [place], PPROM, with reassuring maternal and fetal status at this time.     # PPROM,  @2100  - Latency antibiotics, ampicillin 2g q6hr x48hr, azithro 1g x1, followed by 500mg amoxacillin q8hr x5d  - Betamethasone x2 doses over 24hr for fetal lung maturity  - Magnesium for neuroprotection until completion of betamethasone course  - Monitor for s/s of chorioamnionitis  - next labs 0100    # Mono-Di Twins, variable lie  - S/p laser surgery at [facility] at 16wk GA, TTTS now resolved  - 25w4d A- 730g 12%, B- 705g 8%; discordance 3%. Normal AFV, Dopplers, BPP x2.   - Fetal echos at [place], normal x2  - 26w5d: Cx Yahir 4.0 cm. Twin A: EFW 1'13", fh 140, mvp 4.7 cm. Twin B: EFW 1'14", fh 147, mvp 3.8 cm.    # Pregnancy  - Elevated GCT, will complete GTT after course of betamethasone  - Cont efm/toco.   - Vitals.  - PNVs  - Reg diet  - NICU consult ordered and called  - F/u Gc/Ct, GBS, UCx   27yo  at 27w0d, GBS unknown, mono-di twins with TTTS s/p laser surgery at [place], PPROM, with reassuring maternal and fetal status at this time.     # PPROM,  @2100  - Latency antibiotics, ampicillin 2g q6hr x48hr, azithro 1g x1, followed by 500mg amoxacillin q8hr x5d  - Betamethasone x2 doses over 24hr for fetal lung maturity  - Magnesium for neuroprotection until completion of betamethasone course  - Monitor for s/s of chorioamnionitis    # Mono-Di Twins, variable lie  - S/p laser surgery at [facility] at 16wk GA, TTTS now resolved  - 25w4d A- 730g 12%, B- 705g 8%; discordance 3%. Normal AFV, Dopplers, BPP x2.   - Fetal echos at [place], normal x2  - 26w5d: Cx Yahir 4.0 cm. Twin A: EFW 1'13", fh 140, mvp 4.7 cm. Twin B: EFW 1'14", fh 147, mvp 3.8 cm.    # Pregnancy  - Elevated GCT, will complete GTT after course of betamethasone  - Cont efm/toco.   - Vitals.  - PNVs  - Reg diet  - NICU consult ordered and called  - F/u Gc/Ct, GBS, UCx   27yo  at 27w0d, GBS unknown, mono-di twins with TTTS s/p laser surgery at [place], PPROM, with reassuring maternal and fetal status at this time.     # PPROM,  @2100  - Latency antibiotics, ampicillin 2g q6hr x48hr, azithro 1g x1, followed by 500mg amoxacillin q8hr x5d  - Betamethasone x2 doses over 24hr for fetal lung maturity  - Magnesium for neuroprotection until completion of betamethasone course  - Monitor for s/s of chorioamnionitis    # Mono-Di Twins, variable lie  - S/p laser surgery at [facility] at 16wk GA, TTTS now resolved  - 25w4d A- 730g 12%, B- 705g 8%; discordance 3%. Normal AFV, Dopplers, BPP x2.   - Fetal echos at [place], normal x2  - 26w5d: Cx Yahir 4.0 cm. Twin A: EFW 1'13", fh 140, mvp 4.7 cm. Twin B: EFW 1'14", fh 147, mvp 3.8 cm.    # Pregnancy  - Elevated GCT, will complete GTT after course of betamethasone  - Cont efm/toco.   - Vitals.  - PNVs  - Reg diet  - NICU consult ordered and called  - F/u Gc/Ct, GBS, UCx

## 2023-12-28 NOTE — CONSULT NOTE ADULT - SUBJECTIVE AND OBJECTIVE BOX
PGY3 CONSULT NOTE  Gestational age: 27w0d  Hospital day: #1    HPI: Ms Ayad is a 29yo  at 27w0d YASMIN 24 with monochorionic/diamniotic twin pregnancy, presents for leakage of clear fluid on  at 2100. There was no large gush of fluid but she did continue to note a trickle down her leg, which brought her to the hospital. She denies CTX, VB. Reports good FM. Denies HA, CP, SOB, N/V, fevers, chills, urinary sx, changes in bowel habits. Pregnancy complicated by twin to twin transfusion in second trimester (16wk GA) s/p laser surgery at [facility] on 10/18. Normal fetal echo x2 at [facility]. Also has elevated GCT, GTT not done yet.     Ob Hx:   FT  x1.   16wk SAB vaginal delivery followed by D&C 3wk later for retained POCs    Gyn hx: Denies uterine fibroids, ovarian cysts, abnl paps, STIs    PMHx: Denies  PSHx: D&C x1  All: NKDA  Meds: None  FHx: Denies  Social History: History of cigarette smoking prior to pregnancy. Her  does not smoke. But reports her parents smoke, they do not go there that often. Denies drugs or alcohol use.     MEDICATIONS  (STANDING):  amoxicillin 500 milliGRAM(s) Oral every 8 hours  ampicillin  IVPB      ampicillin  IVPB 2 Gram(s) IV Intermittent every 6 hours  betamethasone Injectable 12 milliGRAM(s) IntraMuscular every 24 hours  famotidine  IVPB 20 milliGRAM(s) IV Intermittent daily  influenza   Vaccine 0.5 milliLiter(s) IntraMuscular once  lactated ringers. 1000 milliLiter(s) (75 mL/Hr) IV Continuous <Continuous>  magnesium sulfate Infusion 2 Gm/Hr (50 mL/Hr) IV Continuous <Continuous>  prenatal multivitamin 1 Tablet(s) Oral daily    Physical exam:  Vital Signs Last 24 Hrs  T(F): 97.9 (28 Dec 2023 06:25), Max: 98.2 (28 Dec 2023 01:16)  HR: 74 (28 Dec 2023 06:52) (67 - 88)  BP: 100/69 (28 Dec 2023 06:37) (98/62 - 125/62)  RR: 18 (28 Dec 2023 06:25) (16 - 18)  SpO2: 92% (28 Dec 2023 06:47) (90% - 99%)    Gen: AAOx3, NAD  Heart: RRR, S1 S2 WNL  Lungs: CTAB  Abdomen: Soft, nontender, no distension, gravid  Ext: No calf tenderness, no swelling    EFM: A - 140/mod rima. B - 135/mod rima  toco: none  SVE: deferred  Speculum: upon presentation - cx appears 0.5cm dilated, nitrazine positive, ferning positive    LABORATORY:                        10.2   11.44 )-----------( 253      ( 28 Dec 2023 04:18 )             30.8     PT/INR - ( 28 Dec 2023 04:18 )   PT: 10.70 sec;   INR: 0.94 ratio    PTT - ( 28 Dec 2023 04:18 )  PTT:30.6 sec      PRIOR SONOS:   12w4d Mono-Di TIUP  16w4d A - Polyhydramnios, EFW 155g. B - Oligohydramnios, Absent bladder, EFW 143g, 8% growth discrepancy. Normal anatomy x2  20w4d  A - MVP 4.8cm, EFW 330g, B- MVP 5.1cm, EFW 356g. Normal dopplers x2. Normal anatomy x2.   22w4d A - Anatomy survey complete. B - limited views of hands and feet, f/u fetal echo. MCA dopplers is on border of normal and mild anemia.   23w4d B - anatomy complete  25w4d A- 730g 12%, B- 705g 8%; discordance 3%. Normal AFV, Dopplers, BPP x2.   Fetal echos at [place], normal x2  26w5d: Cx Yahir 4.0 cm. Twin A: EFW 1'13", fh 140, mvp 4.7 cm. Twin B: EFW 1'14", fh 147, mvp 3.8 cm. PGY3 CONSULT NOTE  Gestational age: 27w0d  Hospital day: #1    HPI: Ms Ayad is a 27yo  at 27w0d YASMIN 24 with monochorionic/diamniotic twin pregnancy, presents for leakage of clear fluid on  at 2100. There was no large gush of fluid but she did continue to note a trickle down her leg, which brought her to the hospital. She denies CTX, VB. Reports good FM. Denies HA, CP, SOB, N/V, fevers, chills, urinary sx, changes in bowel habits. Pregnancy complicated by twin to twin transfusion in second trimester (16wk GA) s/p laser surgery at [facility] on 10/18. Normal fetal echo x2 at [facility]. Also has elevated GCT, GTT not done yet.     Ob Hx:   FT  x1.   16wk SAB vaginal delivery followed by D&C 3wk later for retained POCs    Gyn hx: Denies uterine fibroids, ovarian cysts, abnl paps, STIs    PMHx: Denies  PSHx: D&C x1  All: NKDA  Meds: None  FHx: Denies  Social History: History of cigarette smoking prior to pregnancy. Her  does not smoke. But reports her parents smoke, they do not go there that often. Denies drugs or alcohol use.     MEDICATIONS  (STANDING):  amoxicillin 500 milliGRAM(s) Oral every 8 hours  ampicillin  IVPB      ampicillin  IVPB 2 Gram(s) IV Intermittent every 6 hours  betamethasone Injectable 12 milliGRAM(s) IntraMuscular every 24 hours  famotidine  IVPB 20 milliGRAM(s) IV Intermittent daily  influenza   Vaccine 0.5 milliLiter(s) IntraMuscular once  lactated ringers. 1000 milliLiter(s) (75 mL/Hr) IV Continuous <Continuous>  magnesium sulfate Infusion 2 Gm/Hr (50 mL/Hr) IV Continuous <Continuous>  prenatal multivitamin 1 Tablet(s) Oral daily    Physical exam:  Vital Signs Last 24 Hrs  T(F): 97.9 (28 Dec 2023 06:25), Max: 98.2 (28 Dec 2023 01:16)  HR: 74 (28 Dec 2023 06:52) (67 - 88)  BP: 100/69 (28 Dec 2023 06:37) (98/62 - 125/62)  RR: 18 (28 Dec 2023 06:25) (16 - 18)  SpO2: 92% (28 Dec 2023 06:47) (90% - 99%)    Gen: AAOx3, NAD  Heart: RRR, S1 S2 WNL  Lungs: CTAB  Abdomen: Soft, nontender, no distension, gravid  Ext: No calf tenderness, no swelling    EFM: A - 140/mod rima. B - 135/mod rima  toco: none  SVE: deferred  Speculum: upon presentation - cx appears 0.5cm dilated, nitrazine positive, ferning positive    LABORATORY:                        10.2   11.44 )-----------( 253      ( 28 Dec 2023 04:18 )             30.8     PT/INR - ( 28 Dec 2023 04:18 )   PT: 10.70 sec;   INR: 0.94 ratio    PTT - ( 28 Dec 2023 04:18 )  PTT:30.6 sec      PRIOR SONOS:   12w4d Mono-Di TIUP  16w4d A - Polyhydramnios, EFW 155g. B - Oligohydramnios, Absent bladder, EFW 143g, 8% growth discrepancy. Normal anatomy x2  20w4d  A - MVP 4.8cm, EFW 330g, B- MVP 5.1cm, EFW 356g. Normal dopplers x2. Normal anatomy x2.   22w4d A - Anatomy survey complete. B - limited views of hands and feet, f/u fetal echo. MCA dopplers is on border of normal and mild anemia.   23w4d B - anatomy complete  25w4d A- 730g 12%, B- 705g 8%; discordance 3%. Normal AFV, Dopplers, BPP x2.   Fetal echos at [place], normal x2  26w5d: Cx Yahir 4.0 cm. Twin A: EFW 1'13", fh 140, mvp 4.7 cm. Twin B: EFW 1'14", fh 147, mvp 3.8 cm. PGY3 CONSULT NOTE  Gestational age: 27w0d  Hospital day: #1    HPI: Ms Ayad is a 29yo  at 27w0d YASMIN 24 with monochorionic/diamniotic twin pregnancy, presents for leakage of clear fluid on  at 2100. There was no large gush of fluid but she did continue to note a trickle down her leg, which brought her to the hospital. She denies CTX, VB. Reports good FM. Denies HA, CP, SOB, N/V, fevers, chills, urinary sx, changes in bowel habits. Pregnancy complicated by twin to twin transfusion in second trimester (16wk GA) s/p laser surgery at [facility] on 10/18. Normal fetal echo x2 at [facility]. Also has elevated GCT, GTT not done yet.     Ob Hx:   FT  x1.   16wk SAB vaginal delivery followed by D&C 3wk later for retained POCs    Gyn hx: Denies uterine fibroids, ovarian cysts, abnl paps, STIs    PMHx: Denies  PSHx: D&C x1  All: NKDA  Meds: None  FHx: Denies  Social History: History of cigarette smoking prior to pregnancy. Her  does not smoke. But reports her parents smoke, they do not go there that often. Denies drugs or alcohol use.     Physical exam:  Vital Signs Last 24 Hrs  T(F): 97.9 (28 Dec 2023 06:25), Max: 98.2 (28 Dec 2023 01:16)  HR: 74 (28 Dec 2023 06:52) (67 - 88)  BP: 100/69 (28 Dec 2023 06:37) (98/62 - 125/62)  RR: 18 (28 Dec 2023 06:25) (16 - 18)  SpO2: 92% (28 Dec 2023 06:47) (90% - 99%)    Gen: AAOx3, NAD  Heart: RRR, S1 S2 WNL  Lungs: CTAB  Abdomen: Soft, nontender, no distension, gravid  Ext: No calf tenderness, no swelling    EFM: A - 140/mod rima. B - 135/mod rima  toco: none  SVE: deferred  Speculum: upon presentation - cx appears 0.5cm dilated, nitrazine positive, ferning positive    LABORATORY:                        10.2   11.44 )-----------( 253      ( 28 Dec 2023 04:18 )             30.8     PT/INR - ( 28 Dec 2023 04:18 )   PT: 10.70 sec;   INR: 0.94 ratio    PTT - ( 28 Dec 2023 04:18 )  PTT:30.6 sec      MEDICATIONS  (STANDING):  amoxicillin 500 milliGRAM(s) Oral every 8 hours  ampicillin  IVPB      ampicillin  IVPB 2 Gram(s) IV Intermittent every 6 hours  betamethasone Injectable 12 milliGRAM(s) IntraMuscular every 24 hours  famotidine  IVPB 20 milliGRAM(s) IV Intermittent daily  influenza   Vaccine 0.5 milliLiter(s) IntraMuscular once  lactated ringers. 1000 milliLiter(s) (75 mL/Hr) IV Continuous <Continuous>  magnesium sulfate Infusion 2 Gm/Hr (50 mL/Hr) IV Continuous <Continuous>  prenatal multivitamin 1 Tablet(s) Oral daily    --    PRIOR SONOS:   12w4d Mono-Di TIUP  16w4d A - Polyhydramnios, EFW 155g. B - Oligohydramnios, Absent bladder, EFW 143g, 8% growth discrepancy. Normal anatomy x2  20w4d  A - MVP 4.8cm, EFW 330g, B- MVP 5.1cm, EFW 356g. Normal dopplers x2. Normal anatomy x2.   22w4d A - Anatomy survey complete. B - limited views of hands and feet, f/u fetal echo. MCA dopplers is on border of normal and mild anemia.   23w4d B - anatomy complete  25w4d A- 730g 12%, B- 705g 8%; discordance 3%. Normal AFV, Dopplers, BPP x2.   Fetal echos at [place], normal x2  26w5d: Cx Yahir 4.0 cm. Twin A: EFW 1'13", fh 140, mvp 4.7 cm. Twin B: EFW 1'14", fh 147, mvp 3.8 cm. PGY3 CONSULT NOTE  Gestational age: 27w0d  Hospital day: #1    HPI: Ms Ayad is a 27yo  at 27w0d YASMIN 24 with monochorionic/diamniotic twin pregnancy, presents for leakage of clear fluid on  at 2100. There was no large gush of fluid but she did continue to note a trickle down her leg, which brought her to the hospital. She denies CTX, VB. Reports good FM. Denies HA, CP, SOB, N/V, fevers, chills, urinary sx, changes in bowel habits. Pregnancy complicated by twin to twin transfusion in second trimester (16wk GA) s/p laser surgery at [facility] on 10/18. Normal fetal echo x2 at [facility]. Also has elevated GCT, GTT not done yet.     Ob Hx:   FT  x1.   16wk SAB vaginal delivery followed by D&C 3wk later for retained POCs    Gyn hx: Denies uterine fibroids, ovarian cysts, abnl paps, STIs    PMHx: Denies  PSHx: D&C x1  All: NKDA  Meds: None  FHx: Denies  Social History: History of cigarette smoking prior to pregnancy. Her  does not smoke. But reports her parents smoke, they do not go there that often. Denies drugs or alcohol use.     Physical exam:  Vital Signs Last 24 Hrs  T(F): 97.9 (28 Dec 2023 06:25), Max: 98.2 (28 Dec 2023 01:16)  HR: 74 (28 Dec 2023 06:52) (67 - 88)  BP: 100/69 (28 Dec 2023 06:37) (98/62 - 125/62)  RR: 18 (28 Dec 2023 06:25) (16 - 18)  SpO2: 92% (28 Dec 2023 06:47) (90% - 99%)    Gen: AAOx3, NAD  Heart: RRR, S1 S2 WNL  Lungs: CTAB  Abdomen: Soft, nontender, no distension, gravid  Ext: No calf tenderness, no swelling    EFM: A - 140/mod rima. B - 135/mod rima  toco: none  SVE: deferred  Speculum: upon presentation - cx appears 0.5cm dilated, nitrazine positive, ferning positive    LABORATORY:                        10.2   11.44 )-----------( 253      ( 28 Dec 2023 04:18 )             30.8     PT/INR - ( 28 Dec 2023 04:18 )   PT: 10.70 sec;   INR: 0.94 ratio    PTT - ( 28 Dec 2023 04:18 )  PTT:30.6 sec      MEDICATIONS  (STANDING):  amoxicillin 500 milliGRAM(s) Oral every 8 hours  ampicillin  IVPB      ampicillin  IVPB 2 Gram(s) IV Intermittent every 6 hours  betamethasone Injectable 12 milliGRAM(s) IntraMuscular every 24 hours  famotidine  IVPB 20 milliGRAM(s) IV Intermittent daily  influenza   Vaccine 0.5 milliLiter(s) IntraMuscular once  lactated ringers. 1000 milliLiter(s) (75 mL/Hr) IV Continuous <Continuous>  magnesium sulfate Infusion 2 Gm/Hr (50 mL/Hr) IV Continuous <Continuous>  prenatal multivitamin 1 Tablet(s) Oral daily    --    PRIOR SONOS:   12w4d Mono-Di TIUP  16w4d A - Polyhydramnios, EFW 155g. B - Oligohydramnios, Absent bladder, EFW 143g, 8% growth discrepancy. Normal anatomy x2  20w4d  A - MVP 4.8cm, EFW 330g, B- MVP 5.1cm, EFW 356g. Normal dopplers x2. Normal anatomy x2.   22w4d A - Anatomy survey complete. B - limited views of hands and feet, f/u fetal echo. MCA dopplers is on border of normal and mild anemia.   23w4d B - anatomy complete  25w4d A- 730g 12%, B- 705g 8%; discordance 3%. Normal AFV, Dopplers, BPP x2.   Fetal echos at [place], normal x2  26w5d: Cx Yahir 4.0 cm. Twin A: EFW 1'13", fh 140, mvp 4.7 cm. Twin B: EFW 1'14", fh 147, mvp 3.8 cm. 23  MFM Att'g and PGY3 Initial CONSULT NOTE  Gestational age: 27w0d  Hospital day: #1    HPI: Ms Ayad is a [id number] at 27w0d YASMIN 24 with monochorionic/diamniotic twin pregnancy, presents for leakage of clear fluid on  at 2100. There was no large gush of fluid but she did continue to note a trickle down her leg, which brought her to the hospital. She denies CTX, VB. Reports good FM. Denies HA, CP, SOB, N/V, fevers, chills, urinary sx, changes in bowel habits. Pregnancy complicated by twin to twin transfusion in second trimester (16wk GA) s/p laser surgery at [facility] on 10/18. Normal fetal echo x2 at [facility]. Also has elevated GCT, GTT not done yet.     Ob Hx: .  FT  x1.              16wk SAB vaginal delivery followed by D&C 3wk later for retained POCs  Gyn hx: Denies uterine fibroids, ovarian cysts, abnl paps, STIs    PMHx: Denies  PSHx: D&C x1  All: NKDA  Meds: None  FHx: Denies  Social History: History of cigarette smoking prior to pregnancy. Her  does not smoke. But reports her parents smoke, they do not go there that often. Denies drugs or alcohol use.     Physical exam:  Vital Signs Last 24 Hrs  T(F): 97.9 (28 Dec 2023 06:25), Max: 98.2 (28 Dec 2023 01:16)  HR: 74 (28 Dec 2023 06:52) (67 - 88)  BP: 100/69 (28 Dec 2023 06:37) (98/62 - 125/62)  RR: 18 (28 Dec 2023 06:25) (16 - 18)  SpO2: 92% (28 Dec 2023 06:47) (90% - 99%)    Gen: AAOx3, NAD  Heart: RRR, S1 S2 WNL  Lungs: CTAB  Abdomen: Soft, nontender, no distension, gravid  Ext: No calf tenderness, no swelling    EFM: A - 140/mod rima. B - 135/mod rima  toco: none  SVE: deferred  Speculum: upon presentation - cx appears 0.5cm dilated, nitrazine positive, ferning positive    LABORATORY:                        10.2   11.44 )-----------( 253      ( 28 Dec 2023 04:18 )             30.8     PT/INR - ( 28 Dec 2023 04:18 )   PT: 10.70 sec;   INR: 0.94 ratio    PTT - ( 28 Dec 2023 04:18 )  PTT:30.6 sec      MEDICATIONS  (STANDING):  amoxicillin 500 milliGRAM(s) Oral every 8 hours  ampicillin  IVPB      ampicillin  IVPB 2 Gram(s) IV Intermittent every 6 hours  betamethasone Injectable 12 milliGRAM(s) IntraMuscular every 24 hours  famotidine  IVPB 20 milliGRAM(s) IV Intermittent daily  influenza   Vaccine 0.5 milliLiter(s) IntraMuscular once  lactated ringers. 1000 milliLiter(s) (75 mL/Hr) IV Continuous <Continuous>  magnesium sulfate Infusion 2 Gm/Hr (50 mL/Hr) IV Continuous <Continuous>  prenatal multivitamin 1 Tablet(s) Oral daily    OBUS:   12w4d Mono-Di TIUP  16w4d A - Polyhydramnios, EFW 155g. B - Oligohydramnios, Absent bladder, EFW 143g, 8% growth discrepancy. Normal anatomy x2  20w4d  A - MVP 4.8cm, EFW 330g, B- MVP 5.1cm, EFW 356g. Normal dopplers x2. Normal anatomy x2.   22w4d A - Anatomy survey complete. B - limited views of hands and feet, f/u fetal echo. MCA dopplers is on border of normal and mild anemia.   23w4d B - anatomy complete  25w4d A- 730g 12%, B- 705g 8%; discordance 3%. Normal AFV, Dopplers, BPP x2.   Fetal echos at [place], normal x2  26w5d: Cx Yahir 4.0 cm. Twin A: EFW 1'13", fh 140, mvp 4.7 cm. Twin B: EFW 1'14", fh 147, mvp 3.8 cm. 23  MFM Att'g and PGY3 Initial CONSULT NOTE  Gestational age: 27w0d  Hospital day: #1    HPI: Ms Ayad is a [id number] at 27w0d YASMIN 24 with monochorionic/diamniotic twin pregnancy, presents for leakage of clear fluid on  at 2100. There was no large gush of fluid but she did continue to note a trickle down her leg, which brought her to the hospital. She denies CTX, VB. Reports good FM. Denies HA, CP, SOB, N/V, fevers, chills, urinary sx, changes in bowel habits. Pregnancy complicated by twin to twin transfusion in second trimester (16wk GA) s/p laser surgery at [facility] on 10/18. Normal fetal echo x2 at [facility]. Also has elevated GCT, GTT not done yet.     Ob Hx: .  FT  x1.              16wk SAB vaginal delivery followed by D&C 3wk later for retained POCs  Gyn hx: Denies uterine fibroids, ovarian cysts, abnl paps, STIs    PMHx: Denies  PSHx: D&C x1  All: NKDA  Meds: None  FHx: Denies  Social History: History of cigarette smoking prior to pregnancy. Her  does not smoke. But reports her parents smoke, they do not go there that often. Denies drugs or alcohol use.     Physical exam:  Vital Signs Last 24 Hrs  T(F): 97.9 (28 Dec 2023 06:25), Max: 98.2 (28 Dec 2023 01:16)  HR: 74 (28 Dec 2023 06:52) (67 - 88)  BP: 100/69 (28 Dec 2023 06:37) (98/62 - 125/62)  RR: 18 (28 Dec 2023 06:25) (16 - 18)  SpO2: 92% (28 Dec 2023 06:47) (90% - 99%)    Gen: AAOx3, NAD  Heart: RRR, S1 S2 WNL  Lungs: CTAB  Abdomen: Soft, nontender, no distension, gravid  Ext: No calf tenderness, no swelling    EFM: A - 140/mod rima. B - 135/mod rima  toco: none  SVE: deferred  Speculum: upon presentation - cx appears 0.5cm dilated, nitrazine positive, ferning positive    LABORATORY:                        10.2   11.44 )-----------( 253      ( 28 Dec 2023 04:18 )             30.8     PT/INR - ( 28 Dec 2023 04:18 )   PT: 10.70 sec;   INR: 0.94 ratio    PTT - ( 28 Dec 2023 04:18 )  PTT:30.6 sec      MEDICATIONS  (STANDING):  amoxicillin 500 milliGRAM(s) Oral every 8 hours  ampicillin  IVPB      ampicillin  IVPB 2 Gram(s) IV Intermittent every 6 hours  betamethasone Injectable 12 milliGRAM(s) IntraMuscular every 24 hours  famotidine  IVPB 20 milliGRAM(s) IV Intermittent daily  influenza   Vaccine 0.5 milliLiter(s) IntraMuscular once  lactated ringers. 1000 milliLiter(s) (75 mL/Hr) IV Continuous <Continuous>  magnesium sulfate Infusion 2 Gm/Hr (50 mL/Hr) IV Continuous <Continuous>  prenatal multivitamin 1 Tablet(s) Oral daily    OBUS:   12w4d Mono-Di TIUP  16w4d A - Polyhydramnios, EFW 155g. B - Oligohydramnios, Absent bladder, EFW 143g, 8% growth discrepancy. Normal anatomy x2  20w4d  A - MVP 4.8cm, EFW 330g, B- MVP 5.1cm, EFW 356g. Normal dopplers x2. Normal anatomy x2.   22w4d A - Anatomy survey complete. B - limited views of hands and feet, f/u fetal echo. MCA dopplers is on border of normal and mild anemia.   23w4d B - anatomy complete  25w4d A- 730g 12%, B- 705g 8%; discordance 3%. Normal AFV, Dopplers, BPP x2.   Fetal echos at [place], normal x2  26w5d: Cx Yahir 4.0 cm. Twin A: EFW 1'13", fh 140, mvp 4.7 cm. Twin B: EFW 1'14", fh 147, mvp 3.8 cm.

## 2023-12-28 NOTE — OB PROVIDER H&P - NSHPLABSRESULTS_GEN_ALL_CORE
Genetic screen neg  08/19   HepB NR  HepC NR  rubella immune  varicella immune  RPR neg  HIV neg  Type O pos, Abs neg  HepA IgM & IgG pos    Sonos:   25w4d:   - Twin A: vtx, maternal left, posterior placenta, EFW 730g (12%), mvp 5.11cm, bpp 8/8  - Twin B: vtx, maternal right, posterior placenta, EFW 705g (8%), mvp 5.56cm, bpp 8/8  -  Normal umbilcal and mca dopplers for both twins   23w4d:   - Twin A: transverse, maternal left, posterior placenta, mvp 4.76cm  - Twin B: transverse, maternal right, posterior placenta, mvp 5.56cm  - Normal umbilcal and mca dopplers for both twins, CL 3.5cm

## 2023-12-28 NOTE — OB RN PATIENT PROFILE - NS PRO DEPRESSION SCREENING Y/N6
Addendum  created 01/04/23 1303 by Merissa Vargas CRNA    Flowsheet accepted, Intraprocedure Event edited      
no

## 2023-12-28 NOTE — CONSULT NOTE PEDS - ASSESSMENT
29yo  female admitted at 27 weeks with mono-di twins s/p laser surgery for twin-twin transfusion. Currently admitted for premature ROM 27yo  female admitted at 27 weeks with mono-di twins s/p laser surgery for twin-twin transfusion. Currently admitted for premature ROM

## 2023-12-28 NOTE — PROGRESS NOTE ADULT - ASSESSMENT
Assessment and Plan:   29yo  at 27w0d, GBS unknown, mono-di twins with TTTS s/p laser surgery at [place], PPROM, with reassuring maternal and fetal status at this time.     # PPROM,  @2100  - Latency antibiotics, ampicillin 2g q6hr x48hr, azithro 1g x1, followed by 500mg amoxacillin q8hr x5d  - Betamethasone x2 doses over 24hr for fetal lung maturity  - Magnesium for neuroprotection until completion of betamethasone course  - Monitor for s/s of chorioamnionitis  - Next set of labs at 1800    # [place]-Di Twins, variable lie  - S/p laser surgery at [facility] at 16wk GA, TTTS now resolved  - 25w4d A- 730g 12%, B- 705g 8%; discordance 3%. Normal AFV, Dopplers, BPP x2.   - Fetal echos at [place], normal x2  - 26w5d: Cx Yahir 4.0 cm. Twin A: EFW 1'13", fh 140, mvp 4.7 cm. Twin B: EFW 1'14", fh 147, mvp 3.8 cm.    # Pregnancy  - Elevated GCT, will complete GTT after course of betamethasone  - Cont efm/toco.   - Vitals.  - PNVs  - Reg diet  - NICU consult ordered and called  - F/u Gc/Ct, GBS, UCx     Assessment and Plan:   27yo  at 27w0d, GBS unknown, mono-di twins with TTTS s/p laser surgery at [place], PPROM, with reassuring maternal and fetal status at this time.     # PPROM,  @2100  - Latency antibiotics, ampicillin 2g q6hr x48hr, azithro 1g x1, followed by 500mg amoxacillin q8hr x5d  - Betamethasone x2 doses over 24hr for fetal lung maturity  - Magnesium for neuroprotection until completion of betamethasone course  - Monitor for s/s of chorioamnionitis  - Next set of labs at 1800    # [place]-Di Twins, variable lie  - S/p laser surgery at [facility] at 16wk GA, TTTS now resolved  - 25w4d A- 730g 12%, B- 705g 8%; discordance 3%. Normal AFV, Dopplers, BPP x2.   - Fetal echos at [place], normal x2  - 26w5d: Cx Yahir 4.0 cm. Twin A: EFW 1'13", fh 140, mvp 4.7 cm. Twin B: EFW 1'14", fh 147, mvp 3.8 cm.    # Pregnancy  - Elevated GCT, will complete GTT after course of betamethasone  - Cont efm/toco.   - Vitals.  - PNVs  - Reg diet  - NICU consult ordered and called  - F/u Gc/Ct, GBS, UCx

## 2023-12-28 NOTE — PROGRESS NOTE ADULT - SUBJECTIVE AND OBJECTIVE BOX
PGY1 Magnesium Note     Subjective:   Pt evaluated at bedside for magnesium check. She denies headache, vision changes, dizziness, SOB, chest pain, RUQ/epigastric pain.    Objective:   Vital signs: T(F): 97.88 (23 @ 16:18), Max: 97.88 (23 @ 10:15)  HR: 81 (23 @ 19:45) (75 - 86)  BP: 95/60 (23 @ 19:45) (83/48 - 103/65)    23 @ 07:01  -  23 @ 07:00  --------------------------------------------------------  IN: 375 mL / OUT: 700 mL / NET: -325 mL    23 @ 07:01  -  23 @ 20:18  --------------------------------------------------------  IN: 1125 mL / OUT: 1900 mL / NET: -775 mL        Gen: NAD, AAOx3  CV: S1S2, RRR, no M/R/G  Pulm: CTAB, no rhonchi or rales or wheezing  Ext: no calf tenderness or edema SCDs in place  Neuro: 2+ DTR bilaterally  Abd: soft, GRAVId, nontender, no rebound or guarding, no epigastric tenderness      EFM: A: 130/mod/ no decels, B: 135/mod/no decels  [place]: not evonne  SVE: deferred    Medications:  ampicillin  IVPB: 100 (23 @ 03:35)  ampicillin  IVPB: 100 (23 @ 18:10),  100 (23 @ 12:06)  azithromycin   Tablet: 1000 (23 @ 04:23)  betamethasone Injectable: 12 (23 @ 04:04)  famotidine  IVPB: 104 (23 @ 05:38)  insulin lispro Injectable (ADMELOG).: 4 (23 @ 20:10)  lactated ringers.: 75 (23 @ 03:35)  magnesium sulfate  IVPB: 300 (23 @ 03:53)  magnesium sulfate Infusion: 50 (23 @ 03:00)  prenatal multivitamin: 1 (23 @ 18:23)      Labs:                         9.6    13.87 )-----------( 244      ( 28 Dec 2023 18:59 )             28.7       134<L>  |  103  |  7<L>  ----------------------------<  181<H>  4.2   |  19  |  0.5<L>    Ca    8.1<L>      28 Dec 2023 09:30  Mg     4.9         TPro  6.5  /  Alb  3.7  /  TBili  0.2  /  DBili  x   /  AST  15  /  ALT  11  /  AlkPhos  131<H>        Urinalysis Basic - ( 28 Dec 2023 10:41 )    Color: Yellow / Appearance: Clear / S.009 / pH: x  Gluc: x / Ketone: Negative mg/dL  / Bili: Negative / Urobili: 0.2 mg/dL   Blood: x / Protein: Negative mg/dL / Nitrite: Negative   Leuk Esterase: Negative / RBC: x / WBC x   Sq Epi: x / Non Sq Epi: x / Bacteria: x         PGY1 Magnesium Note     Subjective:   Pt evaluated at bedside for magnesium check. She denies headache, vision changes, dizziness, SOB, chest pain, RUQ/epigastric pain.    Objective:   Vital signs: T(F): 97.88 (23 @ 16:18), Max: 97.88 (23 @ 10:15)  HR: 81 (23 @ 19:45) (75 - 86)  BP: 95/60 (23 @ 19:45) (83/48 - 103/65)    23 @ 07:01  -  23 @ 07:00  --------------------------------------------------------  IN: 375 mL / OUT: 700 mL / NET: -325 mL    23 @ 07:01  -  23 @ 20:18  --------------------------------------------------------  IN: 1125 mL / OUT: 1900 mL / NET: -775 mL        Gen: NAD, AAOx3  CV: S1S2, RRR, no M/R/G  Pulm: CTAB, no rhonchi or rales or wheezing  Ext: no calf tenderness or edema SCDs in place  Neuro: 2+ DTR bilaterally  Abd: soft, GRAVId, nontender, no rebound or guarding, no epigastric tenderness      EFM: A: 130/mod/ no decels, B: 135/mod/no decels  [place]: not evonne  SVE: deferred    Medications:  ampicillin  IVPB: 100 (23 @ 03:35)  ampicillin  IVPB: 100 (23 @ 18:10),  100 (23 @ 12:06)  azithromycin   Tablet: 1000 (23 @ 04:23)  betamethasone Injectable: 12 (23 @ 04:04)  famotidine  IVPB: 104 (23 @ 05:38)  insulin lispro Injectable (ADMELOG).: 4 (23 @ 20:10)  lactated ringers.: 75 (23 @ 03:35)  magnesium sulfate  IVPB: 300 (23 @ 03:53)  magnesium sulfate Infusion: 50 (23 @ 03:00)  prenatal multivitamin: 1 (23 @ 18:23)      Labs:                         9.6    13.87 )-----------( 244      ( 28 Dec 2023 18:59 )             28.7       134<L>  |  103  |  7<L>  ----------------------------<  181<H>  4.2   |  19  |  0.5<L>    Ca    8.1<L>      28 Dec 2023 09:30  Mg     4.9         TPro  6.5  /  Alb  3.7  /  TBili  0.2  /  DBili  x   /  AST  15  /  ALT  11  /  AlkPhos  131<H>        Urinalysis Basic - ( 28 Dec 2023 10:41 )    Color: Yellow / Appearance: Clear / S.009 / pH: x  Gluc: x / Ketone: Negative mg/dL  / Bili: Negative / Urobili: 0.2 mg/dL   Blood: x / Protein: Negative mg/dL / Nitrite: Negative   Leuk Esterase: Negative / RBC: x / WBC x   Sq Epi: x / Non Sq Epi: x / Bacteria: x

## 2023-12-28 NOTE — OB RN TRIAGE NOTE - FALL HARM RISK - UNIVERSAL INTERVENTIONS
Bed in lowest position, wheels locked, appropriate side rails in place/Call bell, personal items and telephone in reach/Instruct patient to call for assistance before getting out of bed or chair/Non-slip footwear when patient is out of bed/[place] to call system/Physically safe environment - no spills, clutter or unnecessary equipment/Purposeful Proactive Rounding/Room/bathroom lighting operational, light cord in reach Bed in lowest position, wheels locked, appropriate side rails in place/Call bell, personal items and telephone in reach/Instruct patient to call for assistance before getting out of bed or chair/Non-slip footwear when patient is out of bed/[place] to call system/Physically safe environment - no spills, clutter or unnecessary equipment/Purposeful Proactive Rounding/Room/bathroom lighting operational, light cord in reach

## 2023-12-28 NOTE — OB PROVIDER H&P - HISTORY OF PRESENT ILLNESS
27yo  at 27w0d YASMIN 24 with monochorionic/diamniotic twin pregnancy, presents for leakage of clear fluid at 2100. Denies contractions or vaginal bleeding. Endorses good fetal movement of both fetuses. Pregnancy complicated by  twin to twin transfusion in second trimester s/p laser surgery at [facility] on 10/18. Also has elevated GCT, GTT not done yet. Denies fevers/chills, nausea/vomiting, chest pain, SOB, or urinary symptoms. Last PO: 6pm. Last BM: this morning. Last intercourse: prior to pregnancy.    27yo  at 27w0d YASMIN 24 with monochorionic/diamniotic twin pregnancy, presents for leakage of clear fluid at 2100. Denies contractions or vaginal bleeding. Endorses good fetal movement of both fetuses. Pregnancy complicated by  twin to twin transfusion in second trimester s/p laser surgery at [facility] on 10/18. Also has elevated GCT, GTT not done yet. Denies fevers/chills, nausea/vomiting, chest pain, SOB, or urinary symptoms. Last PO: 6pm. Last BM: this morning. Last intercourse: prior to pregnancy.

## 2023-12-28 NOTE — CHART NOTE - NSCHARTNOTEFT_GEN_A_CORE
PGY3 NOTE  BEDSIDE SONOGRAM BY DR NINO  INDICATION - PPROM WITH MONO-DI TWINS    A - breech, MVP 2.26cm, MCA PS 32.65 (nl), UA S/D 2.36, BPP 8/8  B - transverse, MVP 2.64cm, MCA PS 35 (nl), UA S/D 2.57, BPP 8/8 12/28/23  HD#1, EGA 27w0d. ROM d#2.     MFM Att'g and PGY3 NOTE for OB limited US with umbilical artery and middle cerebral artery fetal doppler studies.   BEDSIDE SONOGRAM BY DR NINO & KENDRA    INDICATION - PPROM WITH MONOCHORIONIC-DIAMNIONIC TWINS.     A - breech, MVP 2.26cm, MCA PS 32.65 (nl), UA S/D 2.36, BPP 8/8  B - transverse, MVP 2.64cm, MCA PS 35 (nl), UA S/D 2.57, BPP 8/8    MD Jessica, FACOG with MD Honorio and MD Tonya. 12/28/23  HD#1, EGA 27w0d. ROM d#2.     MFM Att'g and PGY3 NOTE for OB limited US with umbilical artery and middle cerebral artery fetal doppler studies.   BEDSIDE SONOGRAM BY DR NINO & KENDRA    INDICATION - PPROM WITH MONOCHORIONIC-DIAMNIONIC TWINS.   A - breech, MVP 2.26cm, MCA PS 32.65 (nl), UA S/D 2.36, BPP 8/8  B - transverse, MVP 2.64cm, MCA PS 35 (nl), UA S/D 2.57, BPP 8/8  Comments:  1.  MONOCHORIONIC DIAMNIOTIC TWIN   INTRAUTERINE GESTATION IS NOTED.   2.  ADEQUATE AND EQUIVALENT AMNIOTIC FLUID   VOLUME IN BOTH SACS.   3.  NORMAL UMBILICAL AND MCA DOPPLER   STUDIES FOR BOTH TWINS.      MD Jessica, FACOG with MD Honorio and MD Tonya.

## 2023-12-28 NOTE — PROGRESS NOTE ADULT - SUBJECTIVE AND OBJECTIVE BOX
PGY1 Magnesium Note     Subjective:   Pt evaluated at bedside for magnesium check. She denies headache, vision changes, dizziness, SOB, chest pain, RUQ/epigastric pain.    Objective:   Vital signs: T(F): 97.88 (23 @ 16:18), Max: 97.9 (23 @ 06:25)  HR: 84 (23 @ 17:28) (69 - 86)  BP: 102/58 (23 @ 17:28) (83/48 - 110/67)  RR: 18 (23 @ 06:53) (18 - 18)  SpO2: 92% (23 @ 06:47) (92% - 99%)    23 @ 07:01  -  23 @ 07:00  --------------------------------------------------------  IN: 375 mL / OUT: 700 mL / NET: -325 mL    23 @ 07:01  -  23 @ 17:43  --------------------------------------------------------  IN: 500 mL / OUT: 800 mL / NET: -300 mL    Gen: NAD, AAOx3  CV: S1S2, RRR, no M/R/G  Pulm: CTAB, no rhonchi or rales or wheezing  Ext: no calf tenderness or edema SCDs in place  Neuro: 2+ DTR bilaterally  Abd: soft, GRAVId, nontender, no rebound or guarding, no epigastric tenderness    Twin A:  140/moderte variability/+ accels   Twin B: 140/moderate variability/+ accels  [place]: no contractions  SVE:  deferred      Medications:  ampicillin  IVPB: 100 (23 @ 03:35)  ampicillin  IVPB: 100 (23 @ 12:06)  azithromycin   Tablet: 1000 (23 @ 04:23)  betamethasone Injectable: 12 (23 @ 04:04)  famotidine  IVPB: 104 (23 @ 05:38)  lactated ringers.: 75 (23 @ 03:35)  magnesium sulfate  IVPB: 300 (23 @ 03:53)  magnesium sulfate Infusion: 50 (23 @ 03:00)      Labs:                         10.5   14.03 )-----------( 236      ( 28 Dec 2023 09:30 )             30.7       134<L>  |  103  |  7<L>  ----------------------------<  181<H>  4.2   |  19  |  0.5<L>    Ca    8.1<L>      28 Dec 2023 09:30  Mg     4.3         TPro  6.5  /  Alb  3.7  /  TBili  0.2  /  DBili  x   /  AST  15  /  ALT  11  /  AlkPhos  131<H>        Urinalysis Basic - ( 28 Dec 2023 10:41 )    Color: Yellow / Appearance: Clear / S.009 / pH: x  Gluc: x / Ketone: Negative mg/dL  / Bili: Negative / Urobili: 0.2 mg/dL   Blood: x / Protein: Negative mg/dL / Nitrite: Negative   Leuk Esterase: Negative / RBC: x / WBC x   Sq Epi: x / Non Sq Epi: x / Bacteria: x           PGY1 Magnesium Note     Subjective:   Pt evaluated at bedside for magnesium check. She denies headache, vision changes, dizziness, SOB, chest pain, RUQ/epigastric pain.    Objective:   Vital signs: T(F): 97.88 (23 @ 16:18), Max: 97.9 (23 @ 06:25)  HR: 84 (23 @ 17:28) (69 - 86)  BP: 102/58 (23 @ 17:28) (83/48 - 110/67)  RR: 18 (23 @ 06:53) (18 - 18)  SpO2: 92% (23 @ 06:47) (92% - 99%)    23 @ 07:01  -  23 @ 07:00  --------------------------------------------------------  IN: 375 mL / OUT: 700 mL / NET: -325 mL    23 @ 07:01  -  23 @ 17:43  --------------------------------------------------------  IN: 500 mL / OUT: 800 mL / NET: -300 mL    Gen: NAD, AAOx3  CV: S1S2, RRR, no M/R/G  Pulm: CTAB, no rhonchi or rales or wheezing  Ext: no calf tenderness or edema SCDs in place  Neuro: 2+ DTR bilaterally  Abd: soft, GRAVId, nontender, no rebound or guarding, no epigastric tenderness    Twin A:  140/moderte variability/+ accels   Twin B: 140/moderate variability/+ accels  [place]: no contractions  SVE:  deferred      Medications:  ampicillin  IVPB: 100 (23 @ 03:35)  ampicillin  IVPB: 100 (23 @ 12:06)  azithromycin   Tablet: 1000 (23 @ 04:23)  betamethasone Injectable: 12 (23 @ 04:04)  famotidine  IVPB: 104 (23 @ 05:38)  lactated ringers.: 75 (23 @ 03:35)  magnesium sulfate  IVPB: 300 (23 @ 03:53)  magnesium sulfate Infusion: 50 (23 @ 03:00)      Labs:                         10.5   14.03 )-----------( 236      ( 28 Dec 2023 09:30 )             30.7       134<L>  |  103  |  7<L>  ----------------------------<  181<H>  4.2   |  19  |  0.5<L>    Ca    8.1<L>      28 Dec 2023 09:30  Mg     4.3         TPro  6.5  /  Alb  3.7  /  TBili  0.2  /  DBili  x   /  AST  15  /  ALT  11  /  AlkPhos  131<H>        Urinalysis Basic - ( 28 Dec 2023 10:41 )    Color: Yellow / Appearance: Clear / S.009 / pH: x  Gluc: x / Ketone: Negative mg/dL  / Bili: Negative / Urobili: 0.2 mg/dL   Blood: x / Protein: Negative mg/dL / Nitrite: Negative   Leuk Esterase: Negative / RBC: x / WBC x   Sq Epi: x / Non Sq Epi: x / Bacteria: x

## 2023-12-28 NOTE — PROGRESS NOTE ADULT - SUBJECTIVE AND OBJECTIVE BOX
PGY2 Magnesium Note     Subjective:   Pt evaluated at bedside for magnesium check. She denies headache, vision changes, dizziness, SOB, chest pain, RUQ/epigastric pain. Continues to endorse leakage of fluid, denies any abdominal pain, contractions, fever, or chills.    Objective:   Vital signs: T(F): 97.88 (23 @ 06:53), Max: 98.2 (23 @ 01:16)  HR: 83 (23 @ 11:45) (67 - 88)  BP: 98/55 (23 @ 11:45) (92/51 - 125/62)  RR: 18 (23 @ 06:53) (16 - 18)  SpO2: 92% (23 @ 06:47) (90% - 99%)    Gen: NAD, AAOx3  CV: S1S2, RRR, no M/R/G  Pulm: CTAB  Ext: no calf tenderness or edema SCDs in place  Neuro: 2+ DTR bilaterally  Abd: soft, GRAVId, nontender, no rebound or guarding, no epigastric tenderness      EFM:   Twin A: 125/mod variability/accels +  Twin B: 130/mod variability/accels +  [place]: no contractions  SVE:  deferred    Medications:  (ADM OVERRIDE): 1 (23 @ 03:03)    ampicillin  IVPB: 100 (23 @ 03:35)  azithromycin   Tablet: 1000 (23 @ 04:23)  betamethasone Injectable: 12 (23 @ 04:04)  famotidine  IVPB: 104 (23 @ 05:38)  lactated ringers.: 75 (23 @ 03:35)  magnesium sulfate  IVPB: 300 (23 @ 03:53)  magnesium sulfate Infusion: 50 (23 @ 03:00)      Labs:                         10.5   14.03 )-----------( 236      ( 28 Dec 2023 09:30 )             30.7       134<L>  |  103  |  7<L>  ----------------------------<  181<H>  4.2   |  19  |  0.5<L>    Ca    8.1<L>      28 Dec 2023 09:30  Mg     4.3         TPro  6.5  /  Alb  3.7  /  TBili  0.2  /  DBili  x   /  AST  15  /  ALT  11  /  AlkPhos  131<H>  -      Urinalysis Basic - ( 28 Dec 2023 10:41 )    Color: Yellow / Appearance: Clear / S.009 / pH: x  Gluc: x / Ketone: Negative mg/dL  / Bili: Negative / Urobili: 0.2 mg/dL   Blood: x / Protein: Negative mg/dL / Nitrite: Negative   Leuk Esterase: Negative / RBC: x / WBC x   Sq Epi: x / Non Sq Epi: x / Bacteria: x           PGY2 Magnesium Note     Subjective:   Pt evaluated at bedside for magnesium check. She denies headache, vision changes, dizziness, SOB, chest pain, RUQ/epigastric pain. Continues to endorse leakage of fluid, denies any abdominal pain, contractions, fever, or chills.    Objective:   Vital signs: T(F): 97.88 (23 @ 06:53), Max: 98.2 (23 @ 01:16)  HR: 83 (23 @ 11:45) (67 - 88)  BP: 98/55 (23 @ 11:45) (92/51 - 125/62)  RR: 18 (23 @ 06:53) (16 - 18)  SpO2: 92% (23 @ 06:47) (90% - 99%)    Gen: NAD, AAOx3  CV: S1S2, RRR, no M/R/G  Pulm: CTAB  Ext: no calf tenderness or edema SCDs in place  Neuro: 2+ DTR bilaterally  Abd: soft, GRAVId, nontender, no rebound or guarding, no epigastric tenderness      EFM:   Twin A: 125/mod variability/accels +  Twin B: 130/mod variability/accels +  [place]: no contractions  SVE:  deferred    Medications:  (ADM OVERRIDE): 1 (23 @ 03:03)    ampicillin  IVPB: 100 (23 @ 03:35)  azithromycin   Tablet: 1000 (23 @ 04:23)  betamethasone Injectable: 12 (23 @ 04:04)  famotidine  IVPB: 104 (23 @ 05:38)  lactated ringers.: 75 (23 @ 03:35)  magnesium sulfate  IVPB: 300 (23 @ 03:53)  magnesium sulfate Infusion: 50 (23 @ 03:00)      Labs:                         10.5   14.03 )-----------( 236      ( 28 Dec 2023 09:30 )             30.7       134<L>  |  103  |  7<L>  ----------------------------<  181<H>  4.2   |  19  |  0.5<L>    Ca    8.1<L>      28 Dec 2023 09:30  Mg     4.3         TPro  6.5  /  Alb  3.7  /  TBili  0.2  /  DBili  x   /  AST  15  /  ALT  11  /  AlkPhos  131<H>  -      Urinalysis Basic - ( 28 Dec 2023 10:41 )    Color: Yellow / Appearance: Clear / S.009 / pH: x  Gluc: x / Ketone: Negative mg/dL  / Bili: Negative / Urobili: 0.2 mg/dL   Blood: x / Protein: Negative mg/dL / Nitrite: Negative   Leuk Esterase: Negative / RBC: x / WBC x   Sq Epi: x / Non Sq Epi: x / Bacteria: x

## 2023-12-28 NOTE — CONSULT NOTE ADULT - ASSESSMENT
29yo  at 27w0d, GBS unknown, mono-di twins with TTTS s/p laser surgery at [place], PPROM, with reassuring maternal and fetal status at this time.     # PPROM,  @2100  - Latency antibiotics, ampicillin 2g q6hr x48hr, azithro 1g x1, followed by 500mg amoxacillin q8hr x5d  - Betamethasone x2 doses over 24hr for fetal lung maturity  - Magnesium for neuroprotection until completion of betamethasone course  - Monitor for s/s of chorioamnionitis  - NICU consult    # [place]-Di Twins, variable lie  - S/p laser surgery at [facility] at 16wk GA, TTTS now resolved  - 25w4d A- 730g 12%, B- 705g 8%; discordance 3%. Normal AFV, Dopplers, BPP x2.   - Fetal echos at [place], normal x2  - 26w5d: Cx Yahir 4.0 cm. Twin A: EFW 1'13", fh 140, mvp 4.7 cm. Twin B: EFW 1'14", fh 147, mvp 3.8 cm.    # Pregnancy  - Elevated GCT, will complete GTT after course of betamethasone  - Cont efm/toco.   - Vitals.  - PNVs  - Clear liquid diet  - F/u pending admission labs, A1C, Gc/Ct, GBS, UCx,     To be d/w Dr Holly 27yo  at 27w0d, GBS unknown, mono-di twins with TTTS s/p laser surgery at [place], PPROM, with reassuring maternal and fetal status at this time.     # PPROM,  @2100  - Latency antibiotics, ampicillin 2g q6hr x48hr, azithro 1g x1, followed by 500mg amoxacillin q8hr x5d  - Betamethasone x2 doses over 24hr for fetal lung maturity  - Magnesium for neuroprotection until completion of betamethasone course  - Monitor for s/s of chorioamnionitis  - NICU consult    # [place]-Di Twins, variable lie  - S/p laser surgery at [facility] at 16wk GA, TTTS now resolved  - 25w4d A- 730g 12%, B- 705g 8%; discordance 3%. Normal AFV, Dopplers, BPP x2.   - Fetal echos at [place], normal x2  - 26w5d: Cx Yahir 4.0 cm. Twin A: EFW 1'13", fh 140, mvp 4.7 cm. Twin B: EFW 1'14", fh 147, mvp 3.8 cm.    # Pregnancy  - Elevated GCT, will complete GTT after course of betamethasone  - Cont efm/toco.   - Vitals.  - PNVs  - Clear liquid diet  - F/u pending admission labs, A1C, Gc/Ct, GBS, UCx,     To be d/w Dr Holly 27yo  at 27w0d, GBS unknown, mono-di twins with TTTS s/p laser surgery at [place], PPROM, with reassuring maternal and fetal status at this time.     # PPROM,  @2100  - Latency antibiotics, ampicillin 2g q6hr x48hr, azithro 1g x1, followed by 500mg amoxacillin q8hr x5d  - Betamethasone x2 doses over 24hr for fetal lung maturity  - Magnesium for neuroprotection until completion of betamethasone course  - Monitor for s/s of chorioamnionitis  - NICU consult    1.  [place]-Di Twins, variable lie at 27w0d with  premature rupture of membranes.  Unclear which sac is ruptured.  Fetal testing and assessment is very reassuring.   - 25w4d A- 730g 12%, B- 705g 8%; discordance 3%. Normal AFV, Dopplers, BPP x2.   - Fetal echos at [place], normal x2  - 26w5d: Cx Yahir 4.0 cm. Twin A: EFW 1'13", fh 140, mvp 4.7 cm. Twin B: EFW 1'14", fh 147, mvp 3.8 cm.    2. - S/p laser surgery at [facility] at 16wk GA, TTTS now resolved.  Both twins now have normal MCA dopplers.   P3ROM is a described complication of laser ablation therapy.       3. Pregnancy  - Elevated GCT, will complete GTT after course of betamethasone  - Cont efm/toco. Vitals.  - Regular diet.    - F/u pending admission labs, A1C, Gc/Ct, GBS, UCx,   -->To 4D after 2nd dose of BMZ.   MD Jessica, FACOG with MD Honorio, PGY-3 29yo  at 27w0d, GBS unknown, mono-di twins with TTTS s/p laser surgery at [place], PPROM, with reassuring maternal and fetal status at this time.     # PPROM,  @2100  - Latency antibiotics, ampicillin 2g q6hr x48hr, azithro 1g x1, followed by 500mg amoxacillin q8hr x5d  - Betamethasone x2 doses over 24hr for fetal lung maturity  - Magnesium for neuroprotection until completion of betamethasone course  - Monitor for s/s of chorioamnionitis  - NICU consult    1.  [place]-Di Twins, variable lie at 27w0d with  premature rupture of membranes.  Unclear which sac is ruptured.  Fetal testing and assessment is very reassuring.   - 25w4d A- 730g 12%, B- 705g 8%; discordance 3%. Normal AFV, Dopplers, BPP x2.   - Fetal echos at [place], normal x2  - 26w5d: Cx Yahir 4.0 cm. Twin A: EFW 1'13", fh 140, mvp 4.7 cm. Twin B: EFW 1'14", fh 147, mvp 3.8 cm.    2. - S/p laser surgery at [facility] at 16wk GA, TTTS now resolved.  Both twins now have normal MCA dopplers.   P3ROM is a described complication of laser ablation therapy.       3. Pregnancy  - Elevated GCT, will complete GTT after course of betamethasone  - Cont efm/toco. Vitals.  - Regular diet.    - F/u pending admission labs, A1C, Gc/Ct, GBS, UCx,   -->To 4D after 2nd dose of BMZ.   MD Jessica, FACOG with MD Honorio, PGY-3

## 2023-12-28 NOTE — CONSULT NOTE PEDS - SUBJECTIVE AND OBJECTIVE BOX
Pt is a 29yo , O+, HIV negative, RPR NR, Hep B negative, Rubella Immune, GBS unknown. She is currently 27 weeks gestation with mono-di twins. She presented to L&D this morning with trickle of fluid and found to be nitrazine and ferning positive. She has history of twin-twin transfusion s/p laser surgery at [place] on 10/18. Most recent EFW from yesterday shows Twin A 1pound 13oz and Twin B 1pound 14oz. Normal fetal echos. She had elevated GCT but GTT not completed yet. She is currently admitted for premature ROM although currently unclear if ROM is Twin A or B. She has received the first dose of celestone and started on magnesium for neuroprotection. She was also started on latency antibiotics. I spoke to pt at the bedside.       I discussed complications of prematurity as below:      1. Pulmonary immaturity: The babies will be assessed at the time of delivery and likely support will be provided in DR - In the event that the baby has poor respiratory effort, intubation with breathing tube may be necessary, given the GA.  The babies will be at increased risk for developing chronic lung disease depending on the amount and duration of respiratory support required. Betamethasone given will have some benefit for lung maturity prior to delivery.     2. Infection: Given the history of premature rupture of membranes, the babies will be assessed for signs of infection at the time of delivery and will likely be started on antibiotics during that evaluation. An Umbilical venous catheter and umbilical artery catheter would be placed in sterile conditions for IV nutrition and blood draws. They will be removed when no longer necessary.     3. Neurological complications: It is impossible to predict neurodevelopmental outcome with certainty and will depend on the NICU course. Early intervention may be necessary and is very beneficial if the infants qualify for it after discharge. Also due to immaturity, the infants will not be able to regulate temperature and will need to be placed in a humidified, heated isolette initially.     4. Feeding intolerance: Due to immaturity of the gut, there is an increased risk of feeding intolerance. We will start feeds as soon as able and will provide only breast milk and increase the volume slowly, as well as giving IVF or TPN for IV nutrition. I discussed the importance of providing breast milk and colostrum oral care.     5. We will screen for other complications as necessary.        Discharge criteria: tolerating feeds, consistent weight gain, breathing room air, maintaining body temperature in an open crib  Pt is a 27yo , O+, HIV negative, RPR NR, Hep B negative, Rubella Immune, GBS unknown. She is currently 27 weeks gestation with mono-di twins. She presented to L&D this morning with trickle of fluid and found to be nitrazine and ferning positive. She has history of twin-twin transfusion s/p laser surgery at [place] on 10/18. Most recent EFW from yesterday shows Twin A 1pound 13oz and Twin B 1pound 14oz. Normal fetal echos. She had elevated GCT but GTT not completed yet. She is currently admitted for premature ROM although currently unclear if ROM is Twin A or B. She has received the first dose of celestone and started on magnesium for neuroprotection. She was also started on latency antibiotics. I spoke to pt at the bedside.       I discussed complications of prematurity as below:      1. Pulmonary immaturity: The babies will be assessed at the time of delivery and likely support will be provided in DR - In the event that the baby has poor respiratory effort, intubation with breathing tube may be necessary, given the GA.  The babies will be at increased risk for developing chronic lung disease depending on the amount and duration of respiratory support required. Betamethasone given will have some benefit for lung maturity prior to delivery.     2. Infection: Given the history of premature rupture of membranes, the babies will be assessed for signs of infection at the time of delivery and will likely be started on antibiotics during that evaluation. An Umbilical venous catheter and umbilical artery catheter would be placed in sterile conditions for IV nutrition and blood draws. They will be removed when no longer necessary.     3. Neurological complications: It is impossible to predict neurodevelopmental outcome with certainty and will depend on the NICU course. Early intervention may be necessary and is very beneficial if the infants qualify for it after discharge. Also due to immaturity, the infants will not be able to regulate temperature and will need to be placed in a humidified, heated isolette initially.     4. Feeding intolerance: Due to immaturity of the gut, there is an increased risk of feeding intolerance. We will start feeds as soon as able and will provide only breast milk and increase the volume slowly, as well as giving IVF or TPN for IV nutrition. I discussed the importance of providing breast milk and colostrum oral care.     5. We will screen for other complications as necessary.        Discharge criteria: tolerating feeds, consistent weight gain, breathing room air, maintaining body temperature in an open crib

## 2023-12-28 NOTE — OB PROVIDER H&P - NS_OBGYNHISTORY_OBGYN_ALL_OB_FT
ObHx:   - FT NSVDx1, 7-7lbs, female  - D&E for SAB @16wks     GynHx: Denies h/o ovarian cysts, uterine fibroids, abnormal pap smears, or STIs

## 2023-12-29 LAB
CULTURE RESULTS: NO GROWTH — SIGNIFICANT CHANGE UP
CULTURE RESULTS: NO GROWTH — SIGNIFICANT CHANGE UP
GLUCOSE BLDC GLUCOMTR-MCNC: 128 MG/DL — HIGH (ref 70–99)
GLUCOSE BLDC GLUCOMTR-MCNC: 128 MG/DL — HIGH (ref 70–99)
GLUCOSE BLDC GLUCOMTR-MCNC: 143 MG/DL — HIGH (ref 70–99)
GLUCOSE BLDC GLUCOMTR-MCNC: 143 MG/DL — HIGH (ref 70–99)
GLUCOSE BLDC GLUCOMTR-MCNC: 149 MG/DL — HIGH (ref 70–99)
GLUCOSE BLDC GLUCOMTR-MCNC: 149 MG/DL — HIGH (ref 70–99)
GLUCOSE BLDC GLUCOMTR-MCNC: 159 MG/DL — HIGH (ref 70–99)
GLUCOSE BLDC GLUCOMTR-MCNC: 159 MG/DL — HIGH (ref 70–99)
GLUCOSE BLDC GLUCOMTR-MCNC: 181 MG/DL — HIGH (ref 70–99)
GLUCOSE BLDC GLUCOMTR-MCNC: 181 MG/DL — HIGH (ref 70–99)
GLUCOSE BLDC GLUCOMTR-MCNC: 191 MG/DL — HIGH (ref 70–99)
GLUCOSE BLDC GLUCOMTR-MCNC: 191 MG/DL — HIGH (ref 70–99)
GROUP B BETA STREP DNA (PCR): SIGNIFICANT CHANGE UP
GROUP B BETA STREP DNA (PCR): SIGNIFICANT CHANGE UP
HCT VFR BLD CALC: 29.8 % — LOW (ref 37–47)
HCT VFR BLD CALC: 29.8 % — LOW (ref 37–47)
HGB BLD-MCNC: 10 G/DL — LOW (ref 12–16)
HGB BLD-MCNC: 10 G/DL — LOW (ref 12–16)
MAGNESIUM SERPL-MCNC: 5.5 MG/DL — CRITICAL HIGH (ref 1.8–2.4)
MAGNESIUM SERPL-MCNC: 5.5 MG/DL — CRITICAL HIGH (ref 1.8–2.4)
MCHC RBC-ENTMCNC: 28.2 PG — SIGNIFICANT CHANGE UP (ref 27–31)
MCHC RBC-ENTMCNC: 28.2 PG — SIGNIFICANT CHANGE UP (ref 27–31)
MCHC RBC-ENTMCNC: 33.6 G/DL — SIGNIFICANT CHANGE UP (ref 32–37)
MCHC RBC-ENTMCNC: 33.6 G/DL — SIGNIFICANT CHANGE UP (ref 32–37)
MCV RBC AUTO: 84.2 FL — SIGNIFICANT CHANGE UP (ref 81–99)
MCV RBC AUTO: 84.2 FL — SIGNIFICANT CHANGE UP (ref 81–99)
NRBC # BLD: 0 /100 WBCS — SIGNIFICANT CHANGE UP (ref 0–0)
NRBC # BLD: 0 /100 WBCS — SIGNIFICANT CHANGE UP (ref 0–0)
PLATELET # BLD AUTO: 244 K/UL — SIGNIFICANT CHANGE UP (ref 130–400)
PLATELET # BLD AUTO: 244 K/UL — SIGNIFICANT CHANGE UP (ref 130–400)
PMV BLD: 10.2 FL — SIGNIFICANT CHANGE UP (ref 7.4–10.4)
PMV BLD: 10.2 FL — SIGNIFICANT CHANGE UP (ref 7.4–10.4)
RBC # BLD: 3.54 M/UL — LOW (ref 4.2–5.4)
RBC # BLD: 3.54 M/UL — LOW (ref 4.2–5.4)
RBC # FLD: 13.5 % — SIGNIFICANT CHANGE UP (ref 11.5–14.5)
RBC # FLD: 13.5 % — SIGNIFICANT CHANGE UP (ref 11.5–14.5)
SOURCE GROUP B STREP: SIGNIFICANT CHANGE UP
SOURCE GROUP B STREP: SIGNIFICANT CHANGE UP
SPECIMEN SOURCE: SIGNIFICANT CHANGE UP
SPECIMEN SOURCE: SIGNIFICANT CHANGE UP
WBC # BLD: 15.02 K/UL — HIGH (ref 4.8–10.8)
WBC # BLD: 15.02 K/UL — HIGH (ref 4.8–10.8)
WBC # FLD AUTO: 15.02 K/UL — HIGH (ref 4.8–10.8)
WBC # FLD AUTO: 15.02 K/UL — HIGH (ref 4.8–10.8)

## 2023-12-29 PROCEDURE — 99233 SBSQ HOSP IP/OBS HIGH 50: CPT

## 2023-12-29 RX ORDER — DEXTROSE 50 % IN WATER 50 %
12.5 SYRINGE (ML) INTRAVENOUS ONCE
Refills: 0 | Status: DISCONTINUED | OUTPATIENT
Start: 2023-12-29 | End: 2024-01-02

## 2023-12-29 RX ORDER — SODIUM CHLORIDE 9 MG/ML
1000 INJECTION, SOLUTION INTRAVENOUS
Refills: 0 | Status: DISCONTINUED | OUTPATIENT
Start: 2023-12-29 | End: 2024-01-02

## 2023-12-29 RX ORDER — DEXTROSE 50 % IN WATER 50 %
25 SYRINGE (ML) INTRAVENOUS ONCE
Refills: 0 | Status: DISCONTINUED | OUTPATIENT
Start: 2023-12-29 | End: 2024-01-02

## 2023-12-29 RX ORDER — AMOXICILLIN 250 MG/5ML
500 SUSPENSION, RECONSTITUTED, ORAL (ML) ORAL EVERY 8 HOURS
Refills: 0 | Status: DISCONTINUED | OUTPATIENT
Start: 2023-12-29 | End: 2023-12-29

## 2023-12-29 RX ORDER — AMPICILLIN TRIHYDRATE 250 MG
2 CAPSULE ORAL EVERY 6 HOURS
Refills: 0 | Status: DISCONTINUED | OUTPATIENT
Start: 2023-12-29 | End: 2023-12-30

## 2023-12-29 RX ORDER — DEXTROSE 50 % IN WATER 50 %
15 SYRINGE (ML) INTRAVENOUS ONCE
Refills: 0 | Status: DISCONTINUED | OUTPATIENT
Start: 2023-12-29 | End: 2024-01-02

## 2023-12-29 RX ORDER — INSULIN LISPRO 100/ML
4 VIAL (ML) SUBCUTANEOUS ONCE
Refills: 0 | Status: COMPLETED | OUTPATIENT
Start: 2023-12-29 | End: 2023-12-29

## 2023-12-29 RX ORDER — HUMAN INSULIN 100 [IU]/ML
6 INJECTION, SUSPENSION SUBCUTANEOUS
Refills: 0 | Status: DISCONTINUED | OUTPATIENT
Start: 2023-12-30 | End: 2024-01-02

## 2023-12-29 RX ORDER — AMPICILLIN TRIHYDRATE 250 MG
2 CAPSULE ORAL ONCE
Refills: 0 | Status: COMPLETED | OUTPATIENT
Start: 2023-12-29 | End: 2023-12-29

## 2023-12-29 RX ORDER — GLUCAGON INJECTION, SOLUTION 0.5 MG/.1ML
1 INJECTION, SOLUTION SUBCUTANEOUS ONCE
Refills: 0 | Status: DISCONTINUED | OUTPATIENT
Start: 2023-12-29 | End: 2024-01-02

## 2023-12-29 RX ORDER — INSULIN LISPRO 100/ML
6 VIAL (ML) SUBCUTANEOUS
Refills: 0 | Status: DISCONTINUED | OUTPATIENT
Start: 2023-12-29 | End: 2024-01-02

## 2023-12-29 RX ORDER — AMPICILLIN TRIHYDRATE 250 MG
CAPSULE ORAL
Refills: 0 | Status: DISCONTINUED | OUTPATIENT
Start: 2023-12-29 | End: 2023-12-30

## 2023-12-29 RX ORDER — HUMAN INSULIN 100 [IU]/ML
6 INJECTION, SUSPENSION SUBCUTANEOUS
Refills: 0 | Status: DISCONTINUED | OUTPATIENT
Start: 2023-12-29 | End: 2024-01-01

## 2023-12-29 RX ORDER — INSULIN LISPRO 100/ML
6 VIAL (ML) SUBCUTANEOUS
Refills: 0 | Status: DISCONTINUED | OUTPATIENT
Start: 2023-12-30 | End: 2024-01-02

## 2023-12-29 RX ORDER — INSULIN LISPRO 100/ML
2 VIAL (ML) SUBCUTANEOUS ONCE
Refills: 0 | Status: COMPLETED | OUTPATIENT
Start: 2023-12-29 | End: 2023-12-29

## 2023-12-29 RX ORDER — AMOXICILLIN 250 MG/5ML
500 SUSPENSION, RECONSTITUTED, ORAL (ML) ORAL EVERY 8 HOURS
Refills: 0 | Status: COMPLETED | OUTPATIENT
Start: 2023-12-29

## 2023-12-29 RX ADMIN — Medication 100 GRAM(S): at 12:16

## 2023-12-29 RX ADMIN — Medication 100 GRAM(S): at 18:29

## 2023-12-29 RX ADMIN — HUMAN INSULIN 6 UNIT(S): 100 INJECTION, SUSPENSION SUBCUTANEOUS at 18:30

## 2023-12-29 RX ADMIN — Medication 6 UNIT(S): at 18:29

## 2023-12-29 RX ADMIN — Medication 600 MILLIGRAM(S): at 18:28

## 2023-12-29 RX ADMIN — Medication 2 UNIT(S): at 07:48

## 2023-12-29 RX ADMIN — Medication 4 UNIT(S): at 12:16

## 2023-12-29 RX ADMIN — Medication 12 MILLIGRAM(S): at 04:17

## 2023-12-29 RX ADMIN — FAMOTIDINE 104 MILLIGRAM(S): 10 INJECTION INTRAVENOUS at 15:11

## 2023-12-29 NOTE — PROGRESS NOTE ADULT - SUBJECTIVE AND OBJECTIVE BOX
23  MFM Att'g and PGY3 Follow Up CONSULT NOTE  Gestational age: 27w1d  Hospital day: #2    HPI: Ms Lion is a [id number] at 27w1d YASMIN 24 with monochorionic/diamniotic twin pregnancy, with PPROM on  at 2100. This morning she is doing well, denies CTX or VB. Reports good FM x2. Denies HA, CP, SOB, N/V, fevers, chills, urinary sx, changes in bowel habits.     Ob Hx: .  FT  x1.              16wk SAB vaginal delivery followed by D&C 3wk later for retained POCs  Gyn hx: Denies uterine fibroids, ovarian cysts, abnl paps, STIs    PMHx: Denies  PSHx: D&C x1  All: NKDA  Meds: None  FHx: Denies  Social History: History of cigarette smoking prior to pregnancy. Her  does not smoke. But reports her parents smoke, they do not go there that often. Denies drugs or alcohol use.     Physical exam:  Vital Signs Last 24 Hrs  T(C): 36.6 (29 Dec 2023 02:30), Max: 36.6 (28 Dec 2023 06:25)  T(F): 97.9 (29 Dec 2023 02:30), Max: 97.9 (28 Dec 2023 06:25)  HR: 65 (29 Dec 2023 03:39) (65 - 88)  BP: 86/51 (29 Dec 2023 03:39) (83/48 - 106/58)  RR: 18 (28 Dec 2023 06:53) (18 - 18)  SpO2: 92% (28 Dec 2023 06:47) (92% - 98%)    Gen: AAOx3, NAD  Heart: RRR, S1 S2 WNL  Lungs: CTAB  Abdomen: Soft, nontender, no distension, gravid  Ext: No calf tenderness, no swelling    EFM: A - 130/mod rmia. B - 135/mod rima  toco: none  SVE: deferred  Speculum: upon presentation - cx appears 0.5cm dilated, nitrazine positive, ferning positive    LABORATORY:             10.0   15.02 )-----------( 244      (  @ 01:00 )             29.8                9.6    13.87 )-----------( 244      (  @ 18:59 )             28.7                10.5   14.03 )-----------( 236      (  @ 09:30 )             30.7                10.2   11.44 )-----------( 253      (  @ 04:18 )             30.8     PT/INR - ( 28 Dec 2023 04:18 )   PT: 10.70 sec;   INR: 0.94 ratio    PTT - ( 28 Dec 2023 04:18 )  PTT:30.6 sec    GC/CT: neg  A1C with Estimated Average Glucose (23 @ 04:18)    A1C with Estimated Average Glucose Result: 5.6%    MEDICATIONS  (STANDING):  amoxicillin 500 milliGRAM(s) Oral every 8 hours  ampicillin  IVPB 2 Gram(s) IV Intermittent every 6 hours  ampicillin  IVPB      famotidine  IVPB 20 milliGRAM(s) IV Intermittent daily  guaiFENesin  milliGRAM(s) Oral every 12 hours  influenza   Vaccine 0.5 milliLiter(s) IntraMuscular once  lactated ringers. 1000 milliLiter(s) (75 mL/Hr) IV Continuous <Continuous>  prenatal multivitamin 1 Tablet(s) Oral daily    OBUS:   12w4d Mono-Di TIUP  16w4d A - Polyhydramnios, EFW 155g. B - Oligohydramnios, Absent bladder, EFW 143g, 8% growth discrepancy. Normal anatomy x2  20w4d  A - MVP 4.8cm, EFW 330g, B- MVP 5.1cm, EFW 356g. Normal dopplers x2. Normal anatomy x2.   22w4d A - Anatomy survey complete. B - limited views of hands and feet, f/u fetal echo. MCA dopplers is on border of normal and mild anemia.   23w4d B - anatomy complete  25w4d A- 730g 12%, B- 705g 8%; discordance 3%. Normal AFV, Dopplers, BPP x2.   Fetal echos at [place], normal x2  26w5d: Cx Yahir 4.0 cm. Twin A: EFW 1'13", fh 140, mvp 4.7 cm. Twin B: EFW 1'14", fh 147, mvp 3.8 cm. 23  MFM Att'g and PGY3 Follow Up CONSULT NOTE  Gestational age: 27w1d  Hospital day: #2    HPI: Ms Lion is a [id number] at 27w1d YASMIN 24 with monochorionic/diamniotic twin pregnancy, with PPROM on  at 2100. This morning she is doing well, denies CTX or VB. Reports good FM x2. Denies HA, CP, SOB, N/V, fevers, chills, urinary sx, changes in bowel habits.     Ob Hx: .  FT  x1.              16wk SAB vaginal delivery followed by D&C 3wk later for retained POCs  Gyn hx: Denies uterine fibroids, ovarian cysts, abnl paps, STIs    PMHx: Denies  PSHx: D&C x1  All: NKDA  Meds: None  FHx: Denies  Social History: History of cigarette smoking prior to pregnancy. Her  does not smoke. But reports her parents smoke, they do not go there that often. Denies drugs or alcohol use.     Physical exam:  Vital Signs Last 24 Hrs  T(C): 36.6 (29 Dec 2023 02:30), Max: 36.6 (28 Dec 2023 06:25)  T(F): 97.9 (29 Dec 2023 02:30), Max: 97.9 (28 Dec 2023 06:25)  HR: 65 (29 Dec 2023 03:39) (65 - 88)  BP: 86/51 (29 Dec 2023 03:39) (83/48 - 106/58)  RR: 18 (28 Dec 2023 06:53) (18 - 18)  SpO2: 92% (28 Dec 2023 06:47) (92% - 98%)    Gen: AAOx3, NAD  Heart: RRR, S1 S2 WNL  Lungs: CTAB  Abdomen: Soft, nontender, no distension, gravid  Ext: No calf tenderness, no swelling    EFM: A - 130/mod rima. B - 135/mod rima  toco: none  SVE: deferred  Speculum: upon presentation - cx appears 0.5cm dilated, nitrazine positive, ferning positive    LABORATORY:             10.0   15.02 )-----------( 244      (  @ 01:00 )             29.8                9.6    13.87 )-----------( 244      (  @ 18:59 )             28.7                10.5   14.03 )-----------( 236      (  @ 09:30 )             30.7                10.2   11.44 )-----------( 253      (  @ 04:18 )             30.8     PT/INR - ( 28 Dec 2023 04:18 )   PT: 10.70 sec;   INR: 0.94 ratio    PTT - ( 28 Dec 2023 04:18 )  PTT:30.6 sec    GC/CT: neg  A1C with Estimated Average Glucose (23 @ 04:18)    A1C with Estimated Average Glucose Result: 5.6%    MEDICATIONS  (STANDING):  amoxicillin 500 milliGRAM(s) Oral every 8 hours  ampicillin  IVPB 2 Gram(s) IV Intermittent every 6 hours  ampicillin  IVPB      famotidine  IVPB 20 milliGRAM(s) IV Intermittent daily  guaiFENesin  milliGRAM(s) Oral every 12 hours  influenza   Vaccine 0.5 milliLiter(s) IntraMuscular once  lactated ringers. 1000 milliLiter(s) (75 mL/Hr) IV Continuous <Continuous>  prenatal multivitamin 1 Tablet(s) Oral daily    OBUS:   12w4d Mono-Di TIUP  16w4d A - Polyhydramnios, EFW 155g. B - Oligohydramnios, Absent bladder, EFW 143g, 8% growth discrepancy. Normal anatomy x2  20w4d  A - MVP 4.8cm, EFW 330g, B- MVP 5.1cm, EFW 356g. Normal dopplers x2. Normal anatomy x2.   22w4d A - Anatomy survey complete. B - limited views of hands and feet, f/u fetal echo. MCA dopplers is on border of normal and mild anemia.   23w4d B - anatomy complete  25w4d A- 730g 12%, B- 705g 8%; discordance 3%. Normal AFV, Dopplers, BPP x2.   Fetal echos at [place], normal x2  26w5d: Cx Yahir 4.0 cm. Twin A: EFW 1'13", fh 140, mvp 4.7 cm. Twin B: EFW 1'14", fh 147, mvp 3.8 cm.

## 2023-12-29 NOTE — PROVIDER CONTACT NOTE (MEDICATION) - ASSESSMENT
Report endorsed by L&D RN Monica indicated pt is to receive 2 more doses of Ampicillin to complete course but EMR not clear as to how many doses patient received in total as some were not signed off as completed. Previous conversation with Dr. Osorio confirmed pt. is to receive 2 more doses then activate the amoxicillin however it remains unclear as to how many pt. received.

## 2023-12-29 NOTE — PROGRESS NOTE ADULT - ASSESSMENT
29yo  at 27w1d, GBS unknown, mono-di twins with TTTS s/p laser surgery at [place], PPROM, with reassuring maternal and fetal status at this time.     1. PPROM,  @2100  - Latency antibiotics, ampicillin 2g q6hr x48hr, azithro 1g x1, followed by 500mg amoxacillin q8hr x5d  - S/p betamethasone x2 doses over 24hr for fetal lung maturity (-)  - Magnesium for neuroprotection discontinued this morning  - Monitor for s/s of chorioamnionitis  - NICU consult    2.  [place]-Di Twins, variable lie at 27w0d with  premature rupture of membranes.  Unclear which sac is ruptured.  Fetal testing and assessment is very reassuring.   - 25w4d A- 730g 12%, B- 705g 8%; discordance 3%. Normal AFV, Dopplers, BPP x2.   - Fetal echos at [place], normal x2  - 26w5d: Cx Yahir 4.0 cm. Twin A: EFW 1'13", fh 140, mvp 4.7 cm. Twin B: EFW 1'14", fh 147, mvp 3.8 cm.    3. S/p laser surgery at [facility] at 16wk GA, TTTS now resolved.  Both twins now have normal MCA dopplers.   P3ROM is a described complication of laser ablation therapy.       4. Pregnancy  - Elevated GCT, will complete GTT after course of betamethasone  - Cont efm/toco. Vitals.  - Regular diet.    - F/u GBS, UCx,   -->To 4D after 2nd dose of BMZ.      29yo  at 27w1d, GBS unknown, mono-di twins with TTTS s/p laser surgery at [place], PPROM, with reassuring maternal and fetal status at this time.     1. PPROM,  @2100  - Latency antibiotics, ampicillin 2g q6hr x48hr, azithro 1g x1, followed by 500mg amoxacillin q8hr x5d  - S/p betamethasone x2 doses over 24hr for fetal lung maturity (-)  - Magnesium for neuroprotection discontinued this morning  - Monitor for s/s of chorioamnionitis  - NICU consult    2.  [place]-Di Twins, variable lie at 27w0d with  premature rupture of membranes.  Unclear which sac is ruptured.  Fetal testing and assessment is very reassuring.   - 25w4d A- 730g 12%, B- 705g 8%; discordance 3%. Normal AFV, Dopplers, BPP x2.   - Fetal echos at [place], normal x2  - 26w5d: Cx Yahir 4.0 cm. Twin A: EFW 1'13", fh 140, mvp 4.7 cm. Twin B: EFW 1'14", fh 147, mvp 3.8 cm.    3. S/p laser surgery at [facility] at 16wk GA, TTTS now resolved.  Both twins now have normal MCA dopplers.   P3ROM is a described complication of laser ablation therapy.       4. Pregnancy  - Elevated GCT, will complete GTT after course of betamethasone  - Cont efm/toco. Vitals.  - Regular diet.    - F/u GBS, UCx,   -->To 4D after 2nd dose of BMZ.      29yo  at 27w1d, GBS unknown, mono-di twins with TTTS s/p laser surgery at [place], PPROM, with reassuring maternal and fetal status at this time.     1. PPROM,  @2100  - Latency antibiotics, ampicillin 2g q6hr x48hr, azithro 1g x1, followed by 500mg amoxacillin q8hr x5d  - S/p betamethasone x2 doses over 24hr for fetal lung maturity (-)  - Magnesium for neuroprotection discontinued this morning  - Monitor for s/s of chorioamnionitis  - NICU consult    2.  [place]-Di Twins, variable lie at 27w0d with  premature rupture of membranes.  Unclear which sac is ruptured.  Fetal testing and assessment is very reassuring.   - 25w4d A- 730g 12%, B- 705g 8%; discordance 3%. Normal AFV, Dopplers, BPP x2.   - Fetal echos at [place], normal x2  - 26w5d: Cx Yahir 4.0 cm. Twin A: EFW 1'13", fh 140, mvp 4.7 cm. Twin B: EFW 1'14", fh 147, mvp 3.8 cm.    3. S/p laser surgery at [facility] at 16wk GA, TTTS now resolved.  Both twins now have normal MCA dopplers.   P3ROM is a described complication of laser ablation therapy.       4. Pregnancy  - Elevated GCT, will complete GTT after course of betamethasone  - In the meantime, monitor FS fasting and 2hr postprandial with ISS coverage as indicated  - Cont efm/toco. Vitals.  - Regular diet.    - F/u GBS, UCx,   -->To 4D after 2nd dose of BMZ.      27yo  at 27w1d, GBS unknown, mono-di twins with TTTS s/p laser surgery at [place], PPROM, with reassuring maternal and fetal status at this time.     1. PPROM,  @2100  - Latency antibiotics, ampicillin 2g q6hr x48hr, azithro 1g x1, followed by 500mg amoxacillin q8hr x5d  - S/p betamethasone x2 doses over 24hr for fetal lung maturity (-)  - Magnesium for neuroprotection discontinued this morning  - Monitor for s/s of chorioamnionitis  - NICU consult    2.  [place]-Di Twins, variable lie at 27w0d with  premature rupture of membranes.  Unclear which sac is ruptured.  Fetal testing and assessment is very reassuring.   - 25w4d A- 730g 12%, B- 705g 8%; discordance 3%. Normal AFV, Dopplers, BPP x2.   - Fetal echos at [place], normal x2  - 26w5d: Cx Yahir 4.0 cm. Twin A: EFW 1'13", fh 140, mvp 4.7 cm. Twin B: EFW 1'14", fh 147, mvp 3.8 cm.    3. S/p laser surgery at [facility] at 16wk GA, TTTS now resolved.  Both twins now have normal MCA dopplers.   P3ROM is a described complication of laser ablation therapy.       4. Pregnancy  - Elevated GCT, will complete GTT after course of betamethasone  - In the meantime, monitor FS fasting and 2hr postprandial with ISS coverage as indicated  - Cont efm/toco. Vitals.  - Regular diet.    - F/u GBS, UCx,   -->To 4D after 2nd dose of BMZ.      29yo  at 27w1d, GBS unknown, mono-di twins with TTTS s/p laser surgery at [place], PPROM, with reassuring maternal and fetal status at this time.     1. PPROM,  @2100  - Latency antibiotics, ampicillin 2g q6hr x48hr, azithro 1g x1, followed by 500mg amoxacillin q8hr x5d  - S/p betamethasone x2 doses over 24hr for fetal lung maturity (-)  - Magnesium for neuroprotection discontinued this morning  - Monitor for s/s of chorioamnionitis  - NICU consult    2.  [place]-Di Twins, variable lie at 27w0d with  premature rupture of membranes.  Unclear which sac is ruptured.  Fetal testing and assessment is very reassuring.   - 25w4d A- 730g 12%, B- 705g 8%; discordance 3%. Normal AFV, Dopplers, BPP x2.   - Fetal echos at [place], normal x2  - 26w5d: Cx Yahir 4.0 cm. Twin A: EFW 1'13", fh 140, mvp 4.7 cm. Twin B: EFW 1'14", fh 147, mvp 3.8 cm.    3. [facility] 16[facility] GA, TTTS now resolved.  Both twins now have normal MCA dopplers.   P3ROM is a described complication of laser ablation therapy.       4. Pregnancy  - Elevated GCT, will complete GTT after course of betamethasone  - In the meantime, monitor FS fasting and 2hr postprandial with ISS coverage as indicated  - Cont efm/toco. Vitals.  - Regular diet.    - F/u GBS, UCx,   -->To 4D after 2nd dose of BMZ with NST BID and vitals q4 hours.      27yo  at 27w1d, GBS unknown, mono-di twins with TTTS s/p laser surgery at [place], PPROM, with reassuring maternal and fetal status at this time.     1. PPROM,  @2100  - Latency antibiotics, ampicillin 2g q6hr x48hr, azithro 1g x1, followed by 500mg amoxacillin q8hr x5d  - S/p betamethasone x2 doses over 24hr for fetal lung maturity (-)  - Magnesium for neuroprotection discontinued this morning  - Monitor for s/s of chorioamnionitis  - NICU consult    2.  [place]-Di Twins, variable lie at 27w0d with  premature rupture of membranes.  Unclear which sac is ruptured.  Fetal testing and assessment is very reassuring.   - 25w4d A- 730g 12%, B- 705g 8%; discordance 3%. Normal AFV, Dopplers, BPP x2.   - Fetal echos at [place], normal x2  - 26w5d: Cx Yahir 4.0 cm. Twin A: EFW 1'13", fh 140, mvp 4.7 cm. Twin B: EFW 1'14", fh 147, mvp 3.8 cm.    3. S/p laser surgery at [facility] at 16wk GA, TTTS now resolved.  Both twins now have normal MCA dopplers.   P3ROM is a described complication of laser ablation therapy.       4. Pregnancy  - Elevated GCT, will complete GTT after course of betamethasone  - In the meantime, monitor FS fasting and 2hr postprandial with ISS coverage as indicated  - Cont efm/toco. Vitals.  - Regular diet.    - F/u GBS, UCx,   -->To 4D after 2nd dose of BMZ with NST BID and vitals q4 hours.      27yo  at 27w1d, GBS unknown, mono-di twins with TTTS s/p laser surgery at [place], PPROM, with reassuring maternal and fetal status at this time.     1. PPROM,  @2100  - Latency antibiotics, ampicillin 2g q6hr x48hr, azithro 1g x1, followed by 500mg amoxacillin q8hr x5d  - S/p betamethasone x2 doses over 24hr for fetal lung maturity (-)  - Magnesium for neuroprotection discontinued this morning  - Monitor for s/s of chorioamnionitis  - NICU consult done  -->Consider transfer to floor for further monitoring:  NST bid.  If not completed in 30 minutes, we will perform BPP.        We anticipate difficulty tracing these twins at this GA given maternal body habitus.     2.  Mono-Di Twins, variable lie at 27w0d with  premature rupture of membranes.  Unclear which sac is ruptured.  Fetal testing and assessment is very reassuring.   - 25w4d A- 730g 12%, B- 705g 8%; discordance 3%. Normal AFV, Dopplers, BPP x2.   - Fetal echos at [place], normal x2  - 26w5d: Cx Yahir 4.0 cm. Twin A: EFW 1'13", fh 140, mvp 4.7 cm. Twin B: EFW 1'14", fh 147, mvp 3.8 cm.    3. S/p laser surgery at [facility] at 16wk GA, TTTS now resolved.  Both twins now have normal MCA dopplers.   P3ROM is a described complication of laser ablation therapy.       4. Pregnancy  - Elevated GCT, will complete GTT after course of betamethasone  - In the meantime, monitor FS fasting and 2hr postprandial with ISS coverage as indicated  - Cont efm/toco. Vitals.  - Regular diet.    - F/u GBS, UCx,   -->To 4D after 2nd dose of BMZ with NST BID and vitals q4 hours pending Dr Dobbins's approval.    MD Jessica, FACOG with MD Honorio, PGY-3      29yo  at 27w1d, GBS unknown, mono-di twins with TTTS s/p laser surgery at [place], PPROM, with reassuring maternal and fetal status at this time.     1. PPROM,  @2100  - Latency antibiotics, ampicillin 2g q6hr x48hr, azithro 1g x1, followed by 500mg amoxacillin q8hr x5d  - S/p betamethasone x2 doses over 24hr for fetal lung maturity (-)  - Magnesium for neuroprotection discontinued this morning  - Monitor for s/s of chorioamnionitis  - NICU consult done  -->Consider transfer to floor for further monitoring:  NST bid.  If not completed in 30 minutes, we will perform BPP.        We anticipate difficulty tracing these twins at this GA given maternal body habitus.     2.  Mono-Di Twins, variable lie at 27w0d with  premature rupture of membranes.  Unclear which sac is ruptured.  Fetal testing and assessment is very reassuring.   - 25w4d A- 730g 12%, B- 705g 8%; discordance 3%. Normal AFV, Dopplers, BPP x2.   - Fetal echos at [place], normal x2  - 26w5d: Cx Yahir 4.0 cm. Twin A: EFW 1'13", fh 140, mvp 4.7 cm. Twin B: EFW 1'14", fh 147, mvp 3.8 cm.    3. S/p laser surgery at [facility] at 16wk GA, TTTS now resolved.  Both twins now have normal MCA dopplers.   P3ROM is a described complication of laser ablation therapy.       4. Pregnancy  - Elevated GCT, will complete GTT after course of betamethasone  - In the meantime, monitor FS fasting and 2hr postprandial with ISS coverage as indicated  - Cont efm/toco. Vitals.  - Regular diet.    - F/u GBS, UCx,   -->To 4D after 2nd dose of BMZ with NST BID and vitals q4 hours pending Dr Dobbins's approval.    MD Jessica, FACOG with MD Honorio, PGY-3

## 2023-12-29 NOTE — PROGRESS NOTE ADULT - SUBJECTIVE AND OBJECTIVE BOX
PGY1 Magnesium Note     Subjective:   Pt evaluated at bedside for magnesium check. She denies headache, vision changes, dizziness, SOB, chest pain, RUQ/epigastric pain. Denies contractions. Endorses good fetal movement.    Objective:   T(C): 36.6 (12-28-23 @ 21:45), Max: 36.8 (12-28-23 @ 02:50)  T(F): 97.8 (12-28-23 @ 21:45), Max: 98.2 (12-28-23 @ 02:50)  HR: 88 (12-29-23 @ 00:45) (67 - 88)  BP: 96/65 (12-29-23 @ 00:45) (83/48 - 110/67)  RR: 18 (12-28-23 @ 06:53) (16 - 18)  SpO2: 92% (12-28-23 @ 06:47) (90% - 99%)    I&O's Summary    27 Dec 2023 07:01  -  28 Dec 2023 07:00  --------------------------------------------------------  IN: 375 mL / OUT: 700 mL / NET: -325 mL    28 Dec 2023 07:01  -  28 Dec 2023 22:30  --------------------------------------------------------  IN: 1125 mL / OUT: 2400 mL / NET: -1275 mL    Gen: NAD, AAOx3  CV: S1S2, RRR, no M/R/G  Pulm: CTAB, no rhonchi or rales or wheezing  Ext: no calf tenderness or edema SCDs in place  Neuro: 2+ DTR bilaterally  Abd: soft, gravid, nontender, no rebound or guarding, no epigastric tenderness      EFM: A: 130/mod/+accels, B: 135/mod/+accels  [place]: not evonne  SVE: deferred    Medications:  ampicillin  IVPB: 100 (12-28-23 @ 03:35)  ampicillin  IVPB: 100 (12-28-23 @ 18:10),  100 (12-28-23 @ 12:06)  azithromycin   Tablet: 1000 (12-28-23 @ 04:23)  betamethasone Injectable: 12 (12-28-23 @ 04:04)  famotidine  IVPB: 104 (12-28-23 @ 05:38)  insulin lispro Injectable (ADMELOG).: 4 (12-28-23 @ 20:10)  lactated ringers.: 75 (12-28-23 @ 03:35)  magnesium sulfate  IVPB: 300 (12-28-23 @ 03:53)  magnesium sulfate Infusion: 50 (12-28-23 @ 03:00)  prenatal multivitamin: 1 (12-28-23 @ 18:23)      Labs:   12/28 @0418 11.44>10.2/30.8<253, coags 10.4/0.94/30.6, fibrinogen 511, A1C 5.6%, O pos neg, HIV neg, RPR neg, UA neg  @0900 14.03>10.5/30.7<236, 134/4.2/103/19/7/0.5<181, AST/ALT 15/11, Mag 4.3  FS: 111/158/192(4U)  @1900 13.87>9.6/28.7<244, magnesium 4.9  @0100 15.02>10.0/29.8<244     PGY1 Magnesium Note     Subjective:   Pt evaluated at bedside for magnesium check. She denies headache, vision changes, dizziness, SOB, chest pain, RUQ/epigastric pain. Denies contractions. Endorses good fetal movement.    Objective:   T(C): 36.6 (12-28-23 @ 21:45), Max: 36.8 (12-28-23 @ 02:50)  T(F): 97.8 (12-28-23 @ 21:45), Max: 98.2 (12-28-23 @ 02:50)  HR: 88 (12-29-23 @ 00:45) (67 - 88)  BP: 96/65 (12-29-23 @ 00:45) (83/48 - 110/67)  RR: 18 (12-28-23 @ 06:53) (16 - 18)  SpO2: 92% (12-28-23 @ 06:47) (90% - 99%)    I&O's Summary    27 Dec 2023 07:01  -  28 Dec 2023 07:00  --------------------------------------------------------  IN: 375 mL / OUT: 700 mL / NET: -325 mL    28 Dec 2023 07:01  -  28 Dec 2023 22:30  --------------------------------------------------------  IN: 1125 mL / OUT: 2400 mL / NET: -1275 mL    Gen: NAD, AAOx3  CV: S1S2, RRR, no M/R/G  Pulm: CTAB, no rhonchi or rales or wheezing  Ext: no calf tenderness or edema SCDs in place  Neuro: 2+ DTR bilaterally  Abd: soft, gravid, nontender, no rebound or guarding, no epigastric tenderness      EFM: A: 130/mod/+accels, B: 135/mod/+accels  [place]: not evonne  SVE: deferred    Medications:  ampicillin  IVPB: 100 (12-28-23 @ 03:35)  ampicillin  IVPB: 100 (12-28-23 @ 18:10),  100 (12-28-23 @ 12:06)  azithromycin   Tablet: 1000 (12-28-23 @ 04:23)  betamethasone Injectable: 12 (12-28-23 @ 04:04)  famotidine  IVPB: 104 (12-28-23 @ 05:38)  insulin lispro Injectable (ADMELOG).: 4 (12-28-23 @ 20:10)  lactated ringers.: 75 (12-28-23 @ 03:35)  magnesium sulfate  IVPB: 300 (12-28-23 @ 03:53)  magnesium sulfate Infusion: 50 (12-28-23 @ 03:00)  prenatal multivitamin: 1 (12-28-23 @ 18:23)      Labs:   12/28 @0418 11.44>10.2/30.8<253, coags 10.4/0.94/30.6, fibrinogen 511, A1C 5.6%, O pos neg, HIV neg, RPR neg, UA neg  @0900 14.03>10.5/30.7<236, 134/4.2/103/19/7/0.5<181, AST/ALT 15/11, Mag 4.3  FS: 111/158/192(4U)  @1900 13.87>9.6/28.7<244, magnesium 4.9  @0100 15.02>10.0/29.8<244

## 2023-12-29 NOTE — PROGRESS NOTE ADULT - SUBJECTIVE AND OBJECTIVE BOX
PGY1 Magnesium Note     Subjective:   Pt evaluated at bedside for magnesium check. She denies headache, vision changes, dizziness, SOB, chest pain, RUQ/epigastric pain. Denies contractions. Endorses good fetal movement.    Objective:   T(C): 36.6 (23 @ 02:30), Max: 36.6 (23 @ 06:25)  T(F): 97.9 (23 @ 02:30), Max: 97.9 (23 @ 06:25)  HR: 65 (23 @ 03:39) (65 - 88)  BP: 86/51 (23 @ 03:39) (83/48 - 110/67)  RR: 18 (23 @ 06:53) (18 - 18)  SpO2: 92% (23 @ 06:47) (92% - 99%)      I&O's Summary    27 Dec 2023 07:  -  28 Dec 2023 07:00  --------------------------------------------------------  IN: 375 mL / OUT: 700 mL / NET: -325 mL    28 Dec 2023 07:01  -  28 Dec 2023 22:30  --------------------------------------------------------  IN: 1125 mL / OUT: 2400 mL / NET: -1275 mL    Gen: NAD, AAOx3  CV: S1S2, RRR, no M/R/G  Pulm: CTAB, no rhonchi or rales or wheezing  Ext: no calf tenderness or edema SCDs in place  Neuro: 2+ DTR bilaterally  Abd: soft, gravid, nontender, no rebound or guarding, no epigastric tenderness      EFM: monitoring break   [place]: monitoring break  SVE: deferred    Medications:  ampicillin  IVPB: 100 (23 @ 03:35)  ampicillin  IVPB: 100 (23 @ 18:10),  100 (23 @ 12:06)  azithromycin   Tablet: 1000 (23 @ 04:23)  betamethasone Injectable: 12 (23 @ 04:04)  famotidine  IVPB: 104 (23 @ 05:38)  insulin lispro Injectable (ADMELOG).: 4 (23 @ 20:10)  lactated ringers.: 75 (23 @ 03:35)  magnesium sulfate  IVPB: 300 (23 @ 03:53)  magnesium sulfate Infusion: 50 (23 @ 03:00)  prenatal multivitamin: 1 (23 @ 18:23)      Labs:    @0418 11.44>10.2/30.8<253, coags 10.4/0.94/30.6, fibrinogen 511, A1C 5.6%, O pos neg, HIV neg, RPR neg, UA neg  @0900 14.03>10.5/30.7<236, 134/4.2/103/19/7/0.5<181, AST/ALT 15/11, Mag 4.3  FS: 111/158/192(4U)  @1900 13.87>9.6/28.7<244, magnesium 4.9  @0100 15.02>10.0/29.8<244, M.5     PGY1 Magnesium Note     Subjective:   Pt evaluated at bedside for magnesium check. She denies headache, vision changes, dizziness, SOB, chest pain, RUQ/epigastric pain. Denies contractions. Endorses good fetal movement.    Objective:   T(C): 36.6 (23 @ 02:30), Max: 36.6 (23 @ 06:25)  T(F): 97.9 (23 @ 02:30), Max: 97.9 (23 @ 06:25)  HR: 65 (23 @ 03:39) (65 - 88)  BP: 86/51 (23 @ 03:39) (83/48 - 110/67)  RR: 18 (23 @ 06:53) (18 - 18)  SpO2: 92% (23 @ 06:47) (92% - 99%)      I&O's Summary    27 Dec 2023 07:  -  28 Dec 2023 07:00  --------------------------------------------------------  IN: 375 mL / OUT: 700 mL / NET: -325 mL    28 Dec 2023 07:01  -  28 Dec 2023 22:30  --------------------------------------------------------  IN: 1125 mL / OUT: 2400 mL / NET: -1275 mL    Gen: NAD, AAOx3  CV: S1S2, RRR, no M/R/G  Pulm: CTAB, no rhonchi or rales or wheezing  Ext: no calf tenderness or edema SCDs in place  Neuro: 2+ DTR bilaterally  Abd: soft, gravid, nontender, no rebound or guarding, no epigastric tenderness      EFM: monitoring break   [place]: monitoring break  SVE: deferred    Medications:  ampicillin  IVPB: 100 (23 @ 03:35)  ampicillin  IVPB: 100 (23 @ 18:10),  100 (23 @ 12:06)  azithromycin   Tablet: 1000 (23 @ 04:23)  betamethasone Injectable: 12 (23 @ 04:04)  famotidine  IVPB: 104 (23 @ 05:38)  insulin lispro Injectable (ADMELOG).: 4 (23 @ 20:10)  lactated ringers.: 75 (23 @ 03:35)  magnesium sulfate  IVPB: 300 (23 @ 03:53)  magnesium sulfate Infusion: 50 (23 @ 03:00)  prenatal multivitamin: 1 (23 @ 18:23)      Labs:    @0418 11.44>10.2/30.8<253, coags 10.4/0.94/30.6, fibrinogen 511, A1C 5.6%, O pos neg, HIV neg, RPR neg, UA neg  @0900 14.03>10.5/30.7<236, 134/4.2/103/19/7/0.5<181, AST/ALT 15/11, Mag 4.3  FS: 111/158/192(4U)  @1900 13.87>9.6/28.7<244, magnesium 4.9  @0100 15.02>10.0/29.8<244, M.5

## 2023-12-29 NOTE — PROGRESS NOTE ADULT - ATTENDING COMMENTS
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  Fetal monitoring has been reassuring.    We plan twice daily fetal evaluation and modified in-hospital bed rest until delivery.  nina [place]-Di Twin IUP at 27w s/p laser ablation for twin-twin transfusion syndrome, with P3ROM, GBS neg.  Fetal monitoring has been reassuring.    We plan twice daily fetal evaluation and modified in-hospital bed rest until delivery.  nina

## 2023-12-29 NOTE — PROGRESS NOTE ADULT - ASSESSMENT
29yo  at 27w0d, GBS unknown, mono-di twins with TTTS s/p laser surgery at [place], PPROM, on magnesium for neuroprotection.     # PPROM,  @2100  - Latency antibiotics, ampicillin 2g q6hr x48hr, azithro 1g x1, followed by 500mg amoxacillin q8hr x5d  - Betamethasone x2 doses over 24hr for fetal lung maturity  - Magnesium for neuroprotection until completion of betamethasone course  - Monitor for s/s of chorioamnionitis    # Mono-Di Twins, variable lie  - S/p laser surgery at [facility] at 16wk GA, TTTS now resolved  - 25w4d A- 730g 12%, B- 705g 8%; discordance 3%. Normal AFV, Dopplers, BPP x2.   - Fetal echos at [place], normal x2  - 26w5d: Cx Yahir 4.0 cm. Twin A: EFW 1'13", fh 140, mvp 4.7 cm. Twin B: EFW 1'14", fh 147, mvp 3.8 cm.    # Pregnancy  - Elevated GCT, will complete GTT after course of betamethasone  - Cont efm/toco.   - Vitals.  - Reg diet  - neuro checks q2hrs  - next CBC and mag @0700 29yo  at 27w0d, GBS unknown, mono-di twins with TTTS s/p laser surgery at [place], PPROM, on magnesium for neuroprotection.     # PPROM,  @2100  - Latency antibiotics, ampicillin 2g q6hr x48hr, azithro 1g x1, followed by 500mg amoxacillin q8hr x5d  - Betamethasone x2 doses over 24hr for fetal lung maturity  - Magnesium for neuroprotection until completion of betamethasone course  - Monitor for s/s of chorioamnionitis    # Mono-Di Twins, variable lie  - S/p laser surgery at [facility] at 16wk GA, TTTS now resolved  - 25w4d A- 730g 12%, B- 705g 8%; discordance 3%. Normal AFV, Dopplers, BPP x2.   - Fetal echos at [place], normal x2  - 26w5d: Cx Yahir 4.0 cm. Twin A: EFW 1'13", fh 140, mvp 4.7 cm. Twin B: EFW 1'14", fh 147, mvp 3.8 cm.    # Pregnancy  - Elevated GCT, will complete GTT after course of betamethasone  - Cont efm/toco.   - Vitals.  - Reg diet  - neuro checks q2hrs  - next CBC and mag @0700

## 2023-12-29 NOTE — PROVIDER CONTACT NOTE (MEDICATION) - SITUATION
Provider called to clarify the amount of doses of Ampicillin needed before activating PO Amoxicillin order. MD also asked to clarify activity order as pt. wants to know if she can use the bathroom. Fingersticks confirmed with physician as well. Fasting FS before breakfast only, per MD, fingersticks not needed prior to lunch or dinner but fingerstick is to be taken 2 hours postprandial for each meal.  However, sunrise system requires recent glucose reading within hour of giving any insulin. Per MD, give both NPH & Lispro for dinner this evening.

## 2023-12-29 NOTE — CHART NOTE - NSCHARTNOTEFT_GEN_A_CORE
PGY3 Transfer Note    Transfer from: L&D  Transfer to: Maternity    27yo  at 27w1d, GBS unknown, mono-di twins with TTTS s/p laser surgery at [place], PPROM, with reassuring maternal and fetal status at this time.     1. PPROM,  @2100  - Latency antibiotics, ampicillin 2g q6hr x48hr, azithro 1g x1, followed by 500mg amoxacillin q8hr x5d  - S/p betamethasone x2 doses over 24hr for fetal lung maturity (-)  - Magnesium for neuroprotection discontinued this morning  - Monitor for s/s of chorioamnionitis  - NICU consult    2.  [place]-Di Twins, variable lie at 27w0d with  premature rupture of membranes.  Unclear which sac is ruptured.  Fetal testing and assessment is very reassuring.   - 25w4d A- 730g 12%, B- 705g 8%; discordance 3%. Normal AFV, Dopplers, BPP x2.   - Fetal echos at [place], normal x2  - 26w5d: Cx Yahir 4.0 cm. Twin A: EFW 1'13", fh 140, mvp 4.7 cm. Twin B: EFW 1'14", fh 147, mvp 3.8 cm.    3. S/p laser surgery at [facility] at 16wk GA, TTTS now resolved.  Both twins now have normal MCA dopplers.   P3ROM is a described complication of laser ablation therapy.       4. Pregnancy  - Elevated GCT, will complete GTT after course of betamethasone  - In the meantime, monitor FS fasting and 2hr postprandial with ISS coverage as indicated  - NST BID for maximum of 30 minutes  - vitals q4 hours with temperature  - CC diet  - F/u GBS, UCx,       Vital Signs Last 24 Hrs  T(C): 36.2 (29 Dec 2023 07:30), Max: 36.6 (28 Dec 2023 16:18)  T(F): 97.16 (29 Dec 2023 07:30), Max: 97.9 (29 Dec 2023 02:30)  HR: 65 (29 Dec 2023 03:39) (65 - 88)  BP: 86/51 (29 Dec 2023 03:39) (83/48 - 105/58)      I&O's Summary    28 Dec 2023 07:01  -  29 Dec 2023 07:00  --------------------------------------------------------  IN: 2500 mL / OUT: 4150 mL / NET: -1650 mL          MEDICATIONS  (STANDING):  amoxicillin 500 milliGRAM(s) Oral every 8 hours  ampicillin  IVPB 2 Gram(s) IV Intermittent every 6 hours  ampicillin  IVPB      famotidine  IVPB 20 milliGRAM(s) IV Intermittent daily  guaiFENesin  milliGRAM(s) Oral every 12 hours  influenza   Vaccine 0.5 milliLiter(s) IntraMuscular once  lactated ringers. 1000 milliLiter(s) (75 mL/Hr) IV Continuous <Continuous>  prenatal multivitamin 1 Tablet(s) Oral daily    MEDICATIONS  (PRN):        LABS                                            10.0                  Neurophils% (auto):   x      ( @ 01:00):    15.02)-----------(244          Lymphocytes% (auto):  x                                             29.8                   Eosinphils% (auto):   x        Manual%: Neutrophils x    ; Lymphocytes x    ; Eosinophils x    ; Bands%: x    ; Blasts x                                    x      |  x      |  x                   Calcium: x     / iCa: x      ( @ 01:00)    ----------------------------<  x         Magnesium: 5.5                              x       |  x      |  x                Phosphorous: x PGY3 Transfer Note    Transfer from: L&D  Transfer to: Maternity    29yo  at 27w1d, GBS unknown, mono-di twins with TTTS s/p laser surgery at [place], PPROM, with reassuring maternal and fetal status at this time.     1. PPROM,  @2100  - Latency antibiotics, ampicillin 2g q6hr x48hr, azithro 1g x1, followed by 500mg amoxacillin q8hr x5d  - S/p betamethasone x2 doses over 24hr for fetal lung maturity (-)  - Magnesium for neuroprotection discontinued this morning  - Monitor for s/s of chorioamnionitis  - NICU consult    2.  [place]-Di Twins, variable lie at 27w0d with  premature rupture of membranes.  Unclear which sac is ruptured.  Fetal testing and assessment is very reassuring.   - 25w4d A- 730g 12%, B- 705g 8%; discordance 3%. Normal AFV, Dopplers, BPP x2.   - Fetal echos at [place], normal x2  - 26w5d: Cx Yahir 4.0 cm. Twin A: EFW 1'13", fh 140, mvp 4.7 cm. Twin B: EFW 1'14", fh 147, mvp 3.8 cm.    3. S/p laser surgery at [facility] at 16wk GA, TTTS now resolved.  Both twins now have normal MCA dopplers.   P3ROM is a described complication of laser ablation therapy.       4. Pregnancy  - Elevated GCT, will complete GTT after course of betamethasone  - In the meantime, monitor FS fasting and 2hr postprandial with ISS coverage as indicated  - NST BID for maximum of 30 minutes  - vitals q4 hours with temperature  - CC diet  - F/u GBS, UCx,       Vital Signs Last 24 Hrs  T(C): 36.2 (29 Dec 2023 07:30), Max: 36.6 (28 Dec 2023 16:18)  T(F): 97.16 (29 Dec 2023 07:30), Max: 97.9 (29 Dec 2023 02:30)  HR: 65 (29 Dec 2023 03:39) (65 - 88)  BP: 86/51 (29 Dec 2023 03:39) (83/48 - 105/58)      I&O's Summary    28 Dec 2023 07:01  -  29 Dec 2023 07:00  --------------------------------------------------------  IN: 2500 mL / OUT: 4150 mL / NET: -1650 mL          MEDICATIONS  (STANDING):  amoxicillin 500 milliGRAM(s) Oral every 8 hours  ampicillin  IVPB 2 Gram(s) IV Intermittent every 6 hours  ampicillin  IVPB      famotidine  IVPB 20 milliGRAM(s) IV Intermittent daily  guaiFENesin  milliGRAM(s) Oral every 12 hours  influenza   Vaccine 0.5 milliLiter(s) IntraMuscular once  lactated ringers. 1000 milliLiter(s) (75 mL/Hr) IV Continuous <Continuous>  prenatal multivitamin 1 Tablet(s) Oral daily    MEDICATIONS  (PRN):        LABS                                            10.0                  Neurophils% (auto):   x      ( @ 01:00):    15.02)-----------(244          Lymphocytes% (auto):  x                                             29.8                   Eosinphils% (auto):   x        Manual%: Neutrophils x    ; Lymphocytes x    ; Eosinophils x    ; Bands%: x    ; Blasts x                                    x      |  x      |  x                   Calcium: x     / iCa: x      ( @ 01:00)    ----------------------------<  x         Magnesium: 5.5                              x       |  x      |  x                Phosphorous: x

## 2023-12-29 NOTE — PROGRESS NOTE ADULT - ASSESSMENT
27yo  at 27w0d, GBS unknown, mono-di twins with TTTS s/p laser surgery at [place], PPROM, on magnesium for neuroprotection.     # PPROM,  @2100  - Latency antibiotics, ampicillin 2g q6hr x48hr, azithro 1g x1, followed by 500mg amoxacillin q8hr x5d  - Betamethasone x2 doses over 24hr for fetal lung maturity  - Magnesium for neuroprotection until completion of betamethasone course  - Monitor for s/s of chorioamnionitis    # Mono-Di Twins, variable lie  - S/p laser surgery at [facility] at 16wk GA, TTTS now resolved  - 25w4d A- 730g 12%, B- 705g 8%; discordance 3%. Normal AFV, Dopplers, BPP x2.   - Fetal echos at [place], normal x2  - 26w5d: Cx Yahir 4.0 cm. Twin A: EFW 1'13", fh 140, mvp 4.7 cm. Twin B: EFW 1'14", fh 147, mvp 3.8 cm.    # Pregnancy  - Elevated GCT, will complete GTT after course of betamethasone  - Cont efm/toco. Break from monitoring overnight, resumed at 0400.   - Vitals.  - Reg diet  - neuro checks q2hrs  - next CBC and mag @0700 29yo  at 27w0d, GBS unknown, mono-di twins with TTTS s/p laser surgery at [place], PPROM, on magnesium for neuroprotection.     # PPROM,  @2100  - Latency antibiotics, ampicillin 2g q6hr x48hr, azithro 1g x1, followed by 500mg amoxacillin q8hr x5d  - Betamethasone x2 doses over 24hr for fetal lung maturity  - Magnesium for neuroprotection until completion of betamethasone course  - Monitor for s/s of chorioamnionitis    # Mono-Di Twins, variable lie  - S/p laser surgery at [facility] at 16wk GA, TTTS now resolved  - 25w4d A- 730g 12%, B- 705g 8%; discordance 3%. Normal AFV, Dopplers, BPP x2.   - Fetal echos at [place], normal x2  - 26w5d: Cx Yahir 4.0 cm. Twin A: EFW 1'13", fh 140, mvp 4.7 cm. Twin B: EFW 1'14", fh 147, mvp 3.8 cm.    # Pregnancy  - Elevated GCT, will complete GTT after course of betamethasone  - Cont efm/toco. Break from monitoring overnight, resumed at 0400.   - Vitals.  - Reg diet  - neuro checks q2hrs  - next CBC and mag @0700 27yo  at 27w1d, GBS unknown, mono-di twins with TTTS s/p laser surgery at [place], PPROM, on magnesium for neuroprotection.     # PPROM,  @2100  - Latency antibiotics, ampicillin 2g q6hr x48hr, azithro 1g x1, followed by 500mg amoxacillin q8hr x5d  - Betamethasone x2 doses over 24hr for fetal lung maturity  - Magnesium for neuroprotection until completion of betamethasone course  - Monitor for s/s of chorioamnionitis    # Mono-Di Twins, variable lie  - S/p laser surgery at [facility] at 16wk GA, TTTS now resolved  - 25w4d A- 730g 12%, B- 705g 8%; discordance 3%. Normal AFV, Dopplers, BPP x2.   - Fetal echos at [place], normal x2  - 26w5d: Cx Yahir 4.0 cm. Twin A: EFW 1'13", fh 140, mvp 4.7 cm. Twin B: EFW 1'14", fh 147, mvp 3.8 cm.    # Pregnancy  - Elevated GCT, will complete GTT after course of betamethasone  - Cont efm/toco. Break from monitoring overnight, resumed at 0400.   - Vitals.  - Reg diet  - neuro checks q2hrs  - next CBC and mag @0700 29yo  at 27w1d, GBS unknown, mono-di twins with TTTS s/p laser surgery at [place], PPROM, on magnesium for neuroprotection.     # PPROM,  @2100  - Latency antibiotics, ampicillin 2g q6hr x48hr, azithro 1g x1, followed by 500mg amoxacillin q8hr x5d  - Betamethasone x2 doses over 24hr for fetal lung maturity  - Magnesium for neuroprotection until completion of betamethasone course  - Monitor for s/s of chorioamnionitis    # Mono-Di Twins, variable lie  - S/p laser surgery at [facility] at 16wk GA, TTTS now resolved  - 25w4d A- 730g 12%, B- 705g 8%; discordance 3%. Normal AFV, Dopplers, BPP x2.   - Fetal echos at [place], normal x2  - 26w5d: Cx Yahir 4.0 cm. Twin A: EFW 1'13", fh 140, mvp 4.7 cm. Twin B: EFW 1'14", fh 147, mvp 3.8 cm.    # Pregnancy  - Elevated GCT, will complete GTT after course of betamethasone  - Cont efm/toco. Break from monitoring overnight, resumed at 0400.   - Vitals.  - Reg diet  - neuro checks q2hrs  - next CBC and mag @0700

## 2023-12-30 LAB
BASOPHILS # BLD AUTO: 0.03 K/UL — SIGNIFICANT CHANGE UP (ref 0–0.2)
BASOPHILS NFR BLD AUTO: 0.2 % — SIGNIFICANT CHANGE UP (ref 0–1)
BASOPHILS NFR BLD AUTO: 0.2 % — SIGNIFICANT CHANGE UP (ref 0–1)
BASOPHILS NFR BLD AUTO: 0.3 % — SIGNIFICANT CHANGE UP (ref 0–1)
BASOPHILS NFR BLD AUTO: 0.3 % — SIGNIFICANT CHANGE UP (ref 0–1)
EOSINOPHIL # BLD AUTO: 0.03 K/UL — SIGNIFICANT CHANGE UP (ref 0–0.7)
EOSINOPHIL # BLD AUTO: 0.03 K/UL — SIGNIFICANT CHANGE UP (ref 0–0.7)
EOSINOPHIL # BLD AUTO: 0.07 K/UL — SIGNIFICANT CHANGE UP (ref 0–0.7)
EOSINOPHIL # BLD AUTO: 0.07 K/UL — SIGNIFICANT CHANGE UP (ref 0–0.7)
EOSINOPHIL NFR BLD AUTO: 0.2 % — SIGNIFICANT CHANGE UP (ref 0–8)
EOSINOPHIL NFR BLD AUTO: 0.2 % — SIGNIFICANT CHANGE UP (ref 0–8)
EOSINOPHIL NFR BLD AUTO: 0.6 % — SIGNIFICANT CHANGE UP (ref 0–8)
EOSINOPHIL NFR BLD AUTO: 0.6 % — SIGNIFICANT CHANGE UP (ref 0–8)
GLUCOSE BLDC GLUCOMTR-MCNC: 105 MG/DL — HIGH (ref 70–99)
GLUCOSE BLDC GLUCOMTR-MCNC: 105 MG/DL — HIGH (ref 70–99)
GLUCOSE BLDC GLUCOMTR-MCNC: 119 MG/DL — HIGH (ref 70–99)
GLUCOSE BLDC GLUCOMTR-MCNC: 119 MG/DL — HIGH (ref 70–99)
GLUCOSE BLDC GLUCOMTR-MCNC: 142 MG/DL — HIGH (ref 70–99)
GLUCOSE BLDC GLUCOMTR-MCNC: 142 MG/DL — HIGH (ref 70–99)
GLUCOSE BLDC GLUCOMTR-MCNC: 144 MG/DL — HIGH (ref 70–99)
GLUCOSE BLDC GLUCOMTR-MCNC: 144 MG/DL — HIGH (ref 70–99)
HCT VFR BLD CALC: 26.4 % — LOW (ref 37–47)
HCT VFR BLD CALC: 26.4 % — LOW (ref 37–47)
HCT VFR BLD CALC: 27.1 % — LOW (ref 37–47)
HCT VFR BLD CALC: 27.1 % — LOW (ref 37–47)
HGB BLD-MCNC: 8.6 G/DL — LOW (ref 12–16)
HGB BLD-MCNC: 8.6 G/DL — LOW (ref 12–16)
HGB BLD-MCNC: 9 G/DL — LOW (ref 12–16)
HGB BLD-MCNC: 9 G/DL — LOW (ref 12–16)
IMM GRANULOCYTES NFR BLD AUTO: 2.1 % — HIGH (ref 0.1–0.3)
IMM GRANULOCYTES NFR BLD AUTO: 2.1 % — HIGH (ref 0.1–0.3)
IMM GRANULOCYTES NFR BLD AUTO: 2.2 % — HIGH (ref 0.1–0.3)
IMM GRANULOCYTES NFR BLD AUTO: 2.2 % — HIGH (ref 0.1–0.3)
LYMPHOCYTES # BLD AUTO: 1.98 K/UL — SIGNIFICANT CHANGE UP (ref 1.2–3.4)
LYMPHOCYTES # BLD AUTO: 1.98 K/UL — SIGNIFICANT CHANGE UP (ref 1.2–3.4)
LYMPHOCYTES # BLD AUTO: 14.2 % — LOW (ref 20.5–51.1)
LYMPHOCYTES # BLD AUTO: 14.2 % — LOW (ref 20.5–51.1)
LYMPHOCYTES # BLD AUTO: 18.1 % — LOW (ref 20.5–51.1)
LYMPHOCYTES # BLD AUTO: 18.1 % — LOW (ref 20.5–51.1)
LYMPHOCYTES # BLD AUTO: 2.11 K/UL — SIGNIFICANT CHANGE UP (ref 1.2–3.4)
LYMPHOCYTES # BLD AUTO: 2.11 K/UL — SIGNIFICANT CHANGE UP (ref 1.2–3.4)
MCHC RBC-ENTMCNC: 27.7 PG — SIGNIFICANT CHANGE UP (ref 27–31)
MCHC RBC-ENTMCNC: 27.7 PG — SIGNIFICANT CHANGE UP (ref 27–31)
MCHC RBC-ENTMCNC: 27.9 PG — SIGNIFICANT CHANGE UP (ref 27–31)
MCHC RBC-ENTMCNC: 27.9 PG — SIGNIFICANT CHANGE UP (ref 27–31)
MCHC RBC-ENTMCNC: 32.6 G/DL — SIGNIFICANT CHANGE UP (ref 32–37)
MCHC RBC-ENTMCNC: 32.6 G/DL — SIGNIFICANT CHANGE UP (ref 32–37)
MCHC RBC-ENTMCNC: 33.2 G/DL — SIGNIFICANT CHANGE UP (ref 32–37)
MCHC RBC-ENTMCNC: 33.2 G/DL — SIGNIFICANT CHANGE UP (ref 32–37)
MCV RBC AUTO: 83.9 FL — SIGNIFICANT CHANGE UP (ref 81–99)
MCV RBC AUTO: 83.9 FL — SIGNIFICANT CHANGE UP (ref 81–99)
MCV RBC AUTO: 85.2 FL — SIGNIFICANT CHANGE UP (ref 81–99)
MCV RBC AUTO: 85.2 FL — SIGNIFICANT CHANGE UP (ref 81–99)
MONOCYTES # BLD AUTO: 0.95 K/UL — HIGH (ref 0.1–0.6)
MONOCYTES # BLD AUTO: 0.95 K/UL — HIGH (ref 0.1–0.6)
MONOCYTES # BLD AUTO: 0.98 K/UL — HIGH (ref 0.1–0.6)
MONOCYTES # BLD AUTO: 0.98 K/UL — HIGH (ref 0.1–0.6)
MONOCYTES NFR BLD AUTO: 7 % — SIGNIFICANT CHANGE UP (ref 1.7–9.3)
MONOCYTES NFR BLD AUTO: 7 % — SIGNIFICANT CHANGE UP (ref 1.7–9.3)
MONOCYTES NFR BLD AUTO: 8.2 % — SIGNIFICANT CHANGE UP (ref 1.7–9.3)
MONOCYTES NFR BLD AUTO: 8.2 % — SIGNIFICANT CHANGE UP (ref 1.7–9.3)
NEUTROPHILS # BLD AUTO: 10.67 K/UL — HIGH (ref 1.4–6.5)
NEUTROPHILS # BLD AUTO: 10.67 K/UL — HIGH (ref 1.4–6.5)
NEUTROPHILS # BLD AUTO: 8.21 K/UL — HIGH (ref 1.4–6.5)
NEUTROPHILS # BLD AUTO: 8.21 K/UL — HIGH (ref 1.4–6.5)
NEUTROPHILS NFR BLD AUTO: 70.6 % — SIGNIFICANT CHANGE UP (ref 42.2–75.2)
NEUTROPHILS NFR BLD AUTO: 70.6 % — SIGNIFICANT CHANGE UP (ref 42.2–75.2)
NEUTROPHILS NFR BLD AUTO: 76.3 % — HIGH (ref 42.2–75.2)
NEUTROPHILS NFR BLD AUTO: 76.3 % — HIGH (ref 42.2–75.2)
NRBC # BLD: 0 /100 WBCS — SIGNIFICANT CHANGE UP (ref 0–0)
PLATELET # BLD AUTO: 212 K/UL — SIGNIFICANT CHANGE UP (ref 130–400)
PLATELET # BLD AUTO: 212 K/UL — SIGNIFICANT CHANGE UP (ref 130–400)
PLATELET # BLD AUTO: 238 K/UL — SIGNIFICANT CHANGE UP (ref 130–400)
PLATELET # BLD AUTO: 238 K/UL — SIGNIFICANT CHANGE UP (ref 130–400)
PMV BLD: 10.6 FL — HIGH (ref 7.4–10.4)
RBC # BLD: 3.1 M/UL — LOW (ref 4.2–5.4)
RBC # BLD: 3.1 M/UL — LOW (ref 4.2–5.4)
RBC # BLD: 3.23 M/UL — LOW (ref 4.2–5.4)
RBC # BLD: 3.23 M/UL — LOW (ref 4.2–5.4)
RBC # FLD: 14 % — SIGNIFICANT CHANGE UP (ref 11.5–14.5)
RBC # FLD: 14 % — SIGNIFICANT CHANGE UP (ref 11.5–14.5)
RBC # FLD: 14.1 % — SIGNIFICANT CHANGE UP (ref 11.5–14.5)
RBC # FLD: 14.1 % — SIGNIFICANT CHANGE UP (ref 11.5–14.5)
WBC # BLD: 11.63 K/UL — HIGH (ref 4.8–10.8)
WBC # BLD: 11.63 K/UL — HIGH (ref 4.8–10.8)
WBC # BLD: 13.98 K/UL — HIGH (ref 4.8–10.8)
WBC # BLD: 13.98 K/UL — HIGH (ref 4.8–10.8)
WBC # FLD AUTO: 11.63 K/UL — HIGH (ref 4.8–10.8)
WBC # FLD AUTO: 11.63 K/UL — HIGH (ref 4.8–10.8)
WBC # FLD AUTO: 13.98 K/UL — HIGH (ref 4.8–10.8)
WBC # FLD AUTO: 13.98 K/UL — HIGH (ref 4.8–10.8)

## 2023-12-30 PROCEDURE — 76819 FETAL BIOPHYS PROFIL W/O NST: CPT | Mod: 26,59

## 2023-12-30 PROCEDURE — 99233 SBSQ HOSP IP/OBS HIGH 50: CPT | Mod: 25

## 2023-12-30 RX ORDER — AMOXICILLIN 250 MG/5ML
500 SUSPENSION, RECONSTITUTED, ORAL (ML) ORAL EVERY 8 HOURS
Refills: 0 | Status: DISCONTINUED | OUTPATIENT
Start: 2023-12-30 | End: 2024-01-02

## 2023-12-30 RX ORDER — ZINC SULFATE TAB 220 MG (50 MG ZINC EQUIVALENT) 220 (50 ZN) MG
220 TAB ORAL DAILY
Refills: 0 | Status: DISCONTINUED | OUTPATIENT
Start: 2023-12-30 | End: 2024-01-02

## 2023-12-30 RX ORDER — CHOLECALCIFEROL (VITAMIN D3) 125 MCG
1000 CAPSULE ORAL DAILY
Refills: 0 | Status: DISCONTINUED | OUTPATIENT
Start: 2023-12-30 | End: 2024-01-02

## 2023-12-30 RX ORDER — IRON SUCROSE 20 MG/ML
200 INJECTION, SOLUTION INTRAVENOUS ONCE
Refills: 0 | Status: COMPLETED | OUTPATIENT
Start: 2023-12-31 | End: 2023-12-31

## 2023-12-30 RX ORDER — FOLIC ACID 0.8 MG
1 TABLET ORAL DAILY
Refills: 0 | Status: DISCONTINUED | OUTPATIENT
Start: 2023-12-30 | End: 2024-01-02

## 2023-12-30 RX ORDER — FAMOTIDINE 10 MG/ML
20 INJECTION INTRAVENOUS DAILY
Refills: 0 | Status: DISCONTINUED | OUTPATIENT
Start: 2023-12-30 | End: 2024-01-02

## 2023-12-30 RX ORDER — AMPICILLIN TRIHYDRATE 250 MG
2000 CAPSULE ORAL ONCE
Refills: 0 | Status: COMPLETED | OUTPATIENT
Start: 2023-12-30 | End: 2023-12-30

## 2023-12-30 RX ORDER — ASCORBIC ACID 60 MG
500 TABLET,CHEWABLE ORAL DAILY
Refills: 0 | Status: DISCONTINUED | OUTPATIENT
Start: 2023-12-30 | End: 2024-01-02

## 2023-12-30 RX ADMIN — Medication 600 MILLIGRAM(S): at 19:05

## 2023-12-30 RX ADMIN — Medication 200 MILLIGRAM(S): at 02:37

## 2023-12-30 RX ADMIN — Medication 500 MILLIGRAM(S): at 12:09

## 2023-12-30 RX ADMIN — Medication 600 MILLIGRAM(S): at 06:56

## 2023-12-30 RX ADMIN — Medication 500 MILLIGRAM(S): at 19:06

## 2023-12-30 RX ADMIN — HUMAN INSULIN 6 UNIT(S): 100 INJECTION, SUSPENSION SUBCUTANEOUS at 08:29

## 2023-12-30 RX ADMIN — Medication 6 UNIT(S): at 08:29

## 2023-12-30 RX ADMIN — FAMOTIDINE 20 MILLIGRAM(S): 10 INJECTION INTRAVENOUS at 18:04

## 2023-12-30 RX ADMIN — Medication 6 UNIT(S): at 19:04

## 2023-12-30 RX ADMIN — HUMAN INSULIN 6 UNIT(S): 100 INJECTION, SUSPENSION SUBCUTANEOUS at 19:05

## 2023-12-30 NOTE — PROGRESS NOTE ADULT - TIME BILLING
complex presentation with multiple comorbidities during pregnancy.  kpb
complex presentation with multiple comorbidities during pregnancy.  kpb

## 2023-12-30 NOTE — PROGRESS NOTE ADULT - ASSESSMENT
A/P: 29yo  at 27w2d, GBS unknown, mono-di twins with TTTS s/p laser surgery at [place], PPROM, with reassuring maternal and fetal status at this time.     1. PPROM,  @2100  - Latency antibiotics, ampicillin 2g q6hr x48hr, azithro 1g x1, followed by 500mg amoxacillin q8hr x5d  - S/p betamethasone x2 doses over 24hr for fetal lung maturity (-)  - Magnesium for neuroprotection discontinued this morning  - Monitor for s/s of chorioamnionitis  - NICU consult done  -->Consider transfer to floor for further monitoring:  NST bid.  If not completed in 30 minutes, we will perform BPP.        We anticipate difficulty tracing these twins at this GA given maternal body habitus.     2.  Mono-Di Twins, variable lie at 27w2d with  premature rupture of membranes.  Unclear which sac is ruptured.  Fetal testing and assessment is very reassuring.   - 25w4d A- 730g 12%, B- 705g 8%; discordance 3%. Normal AFV, Dopplers, BPP x2.   - Fetal echos at [place], normal x2  - 26w5d: Cx Yahir 4.0 cm. Twin A: EFW 1'13", fh 140, mvp 4.7 cm. Twin B: EFW 1'14", fh 147, mvp 3.8 cm.    3. S/p laser surgery at [facility] at 16wk GA, TTTS now resolved.  Both twins now have normal MCA dopplers.   P3ROM is a described complication of laser ablation therapy.       4. Pregnancy  - Elevated GCT, will complete GTT after course of betamethasone  - In the meantime, monitor FS fasting and 2hr postprandial with ISS coverage as indicated  - Cont efm/toco. Vitals.  - Regular diet.    - F/u GBS, UCx,     Case discussed with Dr. Cohen and Dr. Dobbins A/P: 27yo  at 27w2d, GBS unknown, mono-di twins with TTTS s/p laser surgery at [place], PPROM, with reassuring maternal and fetal status at this time.     1. PPROM,  @2100  - Latency antibiotics, ampicillin 2g q6hr x48hr, azithro 1g x1, followed by 500mg amoxacillin q8hr x5d  - S/p betamethasone x2 doses over 24hr for fetal lung maturity (-)  - Magnesium for neuroprotection discontinued this morning  - Monitor for s/s of chorioamnionitis  - NICU consult done  -->Consider transfer to floor for further monitoring:  NST bid.  If not completed in 30 minutes, we will perform BPP.        We anticipate difficulty tracing these twins at this GA given maternal body habitus.     2.  Mono-Di Twins, variable lie at 27w2d with  premature rupture of membranes.  Unclear which sac is ruptured.  Fetal testing and assessment is very reassuring.   - 25w4d A- 730g 12%, B- 705g 8%; discordance 3%. Normal AFV, Dopplers, BPP x2.   - Fetal echos at [place], normal x2  - 26w5d: Cx Yahir 4.0 cm. Twin A: EFW 1'13", fh 140, mvp 4.7 cm. Twin B: EFW 1'14", fh 147, mvp 3.8 cm.    3. S/p laser surgery at [facility] at 16wk GA, TTTS now resolved.  Both twins now have normal MCA dopplers.   P3ROM is a described complication of laser ablation therapy.       4. Pregnancy  - Elevated GCT, will complete GTT after course of betamethasone  - In the meantime, monitor FS fasting and 2hr postprandial with ISS coverage as indicated  - Cont efm/toco. Vitals.  - Regular diet.    - F/u GBS, UCx,     Case discussed with Dr. Cohen and Dr. Dobbins A/P: 27yo  at 27w2d, GBS pending, mono-di twins with Hx TTTS at 16w, s/p laser surgery at [place], d#4 PPROM, with reassuring maternal and fetal status at this time.     1. PPROM,  @2100  - Latency antibiotics, ampicillin 2g q6hr x48hr, azithro 1g x1, followed by 500mg amoxacillin q8hr x5d  - S/p betamethasone x2 doses over 24hr for fetal lung maturity (-)  - Magnesium for neuroprotection discontinued yesterday  - Monitor for s/s of chorioamnionitis  - NICU consult done  -->Continue care on 4D for further monitoring and in hospital modified bed rest:    -->NST bid.  If not completed in 30 minutes, we will perform BPP.        We anticipate difficulty tracing these twins at this GA given early GA & maternal body habitus.     2.  Mono-Di Twins, variable lie at 27w2d with  premature rupture of membranes.  Unclear which sac is ruptured.  Fetal testing and assessment is very reassuring.-     3. S/p laser surgery at [facility] at 16wk GA, TTTS now resolved.  Both twins now have normal MCA dopplers.   P3ROM is a described complication of laser ablation therapy.       4. Pregnancy  - Elevated GCT, will complete GTT 10d after course of betamethasone  -->In the meantime, continue NPH 6 Units before dinner and 6 Units before breakfast.                                           Lispro 6 Units before dinner and 6 Units before breakfast.   -->Discontinue standing dose of insulin if FBS < 95 and 2h PP <120.   --> Continue to monitor FS fasting and 2hr postprandial with ISS coverage as indicated  - Cont efm/toco. Vitals.  - Regular diet.    - F/u GBS, UCx,     MD Jessica, FACOG with SEAN Weiner MD, PGY-2.  A/P: 29yo  at 27w2d, GBS pending, mono-di twins with Hx TTTS at 16w, s/p laser surgery at [place], d#4 PPROM, with reassuring maternal and fetal status at this time.     1. PPROM,  @2100  - Latency antibiotics, ampicillin 2g q6hr x48hr, azithro 1g x1, followed by 500mg amoxacillin q8hr x5d  - S/p betamethasone x2 doses over 24hr for fetal lung maturity (-)  - Magnesium for neuroprotection discontinued yesterday  - Monitor for s/s of chorioamnionitis  - NICU consult done  -->Continue care on 4D for further monitoring and in hospital modified bed rest:    -->NST bid.  If not completed in 30 minutes, we will perform BPP.        We anticipate difficulty tracing these twins at this GA given early GA & maternal body habitus.     2.  Mono-Di Twins, variable lie at 27w2d with  premature rupture of membranes.  Unclear which sac is ruptured.  Fetal testing and assessment is very reassuring.-     3. S/p laser surgery at [facility] at 16wk GA, TTTS now resolved.  Both twins now have normal MCA dopplers.   P3ROM is a described complication of laser ablation therapy.       4. Pregnancy  - Elevated GCT, will complete GTT 10d after course of betamethasone  -->In the meantime, continue NPH 6 Units before dinner and 6 Units before breakfast.                                           Lispro 6 Units before dinner and 6 Units before breakfast.   -->Discontinue standing dose of insulin if FBS < 95 and 2h PP <120.   --> Continue to monitor FS fasting and 2hr postprandial with ISS coverage as indicated  - Cont efm/toco. Vitals.  - Regular diet.    - F/u GBS, UCx,     MD Jessica, FACOG with SEAN Weiner MD, PGY-2.  A/P: 27yo  at 27w2d, GBS pending, mono-di twins with Hx TTTS at 16w, s/p laser surgery at [place], d#4 PPROM, with reassuring maternal and fetal status at this time.     1. PPROM,  @2100  - Latency antibiotics, ampicillin 2g q6hr x48hr, azithro 1g x1, followed by 500mg amoxacillin q8hr x5d  - S/p betamethasone x2 doses over 24hr for fetal lung maturity (-)  - Magnesium for neuroprotection discontinued yesterday  - Monitor for s/s of chorioamnionitis  - NICU consult done  -->Continue care on 4D for further monitoring and in hospital modified bed rest:    -->NST bid.  If not completed in 30 minutes, we will perform BPP.        We anticipate difficulty tracing these twins at this GA given early GA & maternal body habitus.     2.  Mono-Di Twins, variable lie at 27w2d with  premature rupture of membranes.  Unclear which sac is ruptured.  Fetal testing and assessment is very reassuring.-     3. S/p laser surgery at [facility] at 16wk GA, TTTS now resolved.  Both twins now have normal MCA dopplers.   P3ROM is a described complication of laser ablation therapy.    4. Normocytic anemia: Unclear whether due to hemodilution or blood loss.  Regardless, pt may benefit from Fe supplementation, po or IV.   -->Anemia w/u with am labs.   -->Consider hematology consult for IV venofer or po iron supplement.      MD Jessica< FACOG       4. Pregnancy  - Elevated GCT, will complete GTT 10d after course of betamethasone  -->In the meantime, continue NPH 6 Units before dinner and 6 Units before breakfast.                                           Lispro 6 Units before dinner and 6 Units before breakfast.   -->Discontinue standing dose of insulin if FBS < 95 and 2h PP <120.   --> Continue to monitor FS fasting and 2hr postprandial with ISS coverage as indicated  - Cont efm/toco. Vitals.  - Regular diet.    - F/u GBS, UCx,     MD Jessica, FACOG with SEAN Weiner MD, PGY-2.  A/P: 27yo  at 27w2d, GBS pending, mono-di twins with Hx TTTS at 16w, s/p laser surgery at [place], d#4 PPROM, with reassuring maternal and fetal status at this time.     1. PPROM,  @2100  - Latency antibiotics, ampicillin 2g q6hr x48hr, azithro 1g x1, followed by 500mg amoxacillin q8hr x5d  - S/p betamethasone x2 doses over 24hr for fetal lung maturity (-)  - Magnesium for neuroprotection discontinued yesterday  - Monitor for s/s of chorioamnionitis  - NICU consult done  -->Continue care on 4D for further monitoring and in hospital modified bed rest:    -->NST bid.  If not completed in 30 minutes, we will perform BPP.        We anticipate difficulty tracing these twins at this GA given early GA & maternal body habitus.     2.  Mono-Di Twins, variable lie at 27w2d with  premature rupture of membranes.  Unclear which sac is ruptured.  Fetal testing and assessment is very reassuring.-     3. S/p laser surgery at [facility] at 16wk GA, TTTS now resolved.  Both twins now have normal MCA dopplers.   P3ROM is a described complication of laser ablation therapy.    4. Normocytic anemia: Unclear whether due to hemodilution or blood loss.  Regardless, pt may benefit from Fe supplementation, po or IV.   -->Anemia w/u with am labs.   -->Consider hematology consult for IV venofer or po iron supplement.      MD Jessica< FACOG       4. Pregnancy  - Elevated GCT, will complete GTT 10d after course of betamethasone  -->In the meantime, continue NPH 6 Units before dinner and 6 Units before breakfast.                                           Lispro 6 Units before dinner and 6 Units before breakfast.   -->Discontinue standing dose of insulin if FBS < 95 and 2h PP <120.   --> Continue to monitor FS fasting and 2hr postprandial with ISS coverage as indicated  - Cont efm/toco. Vitals.  - Regular diet.    - F/u GBS, UCx,     MD Jessica, FACOG with SEAN Weiner MD, PGY-2.

## 2023-12-30 NOTE — CHART NOTE - NSCHARTNOTEFT_GEN_A_CORE
PGY 3 Note    ***Delayed entry due to clinical duties***    Called to patient bedside after informed by nurse that patient felt increased LOF and cramping. Patient stated cramping was 4/10 and every 4 min.  Speculum exam patient appeared 0.5 dilated, consistent with prior. Based on patients concerns, decision was made to bring to labor and delivery for continuous fetal monitoring and toco.  BSS: Baby A: breech, Baby B: vertex    Plan:  Transfer to L&D  Will keep patient NPO, IVF  Continue latency abx  will send cbc, cmp, lactate and confirmatory t&s      Case discussed with Dr. Sales PGY 3 Note    ***Delayed entry due to clinical duties***    Called to patient bedside. Per nurse patient felt increased LOF and cramping. Patient stated cramping was 4/10 and every 4 min.  Speculum exam patient appeared 0.5 dilated, consistent with prior. Based on patients concerns, decision was made to bring to labor and delivery for continuous fetal monitoring and toco.  BSS: Baby A: breech, Baby B: vertex    Plan:  Transfer to L&D  Will keep patient NPO, IVF  Continue latency abx  will send cbc, cmp, lactate and confirmatory t&s      Case discussed with Dr. Sales

## 2023-12-30 NOTE — CHART NOTE - NSCHARTNOTEFT_GEN_A_CORE
PGY-1 Note    Patient seen at bedside for BPP, PPROM @27w.     Twin A: breech, MVP 4.30cm, BPP 8/8  Twin B: transverse, back up head maternal right, MVP 4.43cm, BPP 8/8    Discussed with Dr. Cohen.

## 2023-12-30 NOTE — CHART NOTE - NSCHARTNOTEFT_GEN_A_CORE
PGY 1 Note    12/30 BSS   A - transverse, maternal head left, MVP 2.94cm,  BPP 8/8  B - transverse, maternal head right, MVP 3.03cm, BPP 8/8.    Dr. Monreal aware 23  MFM Att'g and PGY 1 Note:    Bioophysical Profile x2 was required to assess fetal wellbeing of twins with prolonged  rupture of membranes at 27 weeks.       A - transverse, maternal head left, MVP 2.94cm,  BPP 8/8  B - transverse, maternal head right, MVP 3.03cm, BPP 8/8.    MD Jessica, FACOG with MD Adam, PGY-1 23  MFM Att'g and PGY 1 Note:    Bioophysical Profile x2 was required to assess fetal wellbeing of twins with prolonged  rupture of membranes at 27 weeks.       A - transverse, maternal head left, MVP 2.94cm,  BPP 8/8  B - transverse, maternal head right, MVP 3.03cm, BPP 8/8.    MD Jessica, FACOG with MD Adma, PGY-1

## 2023-12-30 NOTE — PROGRESS NOTE ADULT - SUBJECTIVE AND OBJECTIVE BOX
PGY2 Progress Note  Gestational age: 27w2d  Hospital day: #3    HPI: Ms Lion is a [id number] at 27w2d YASMIN 24 with monochorionic/diamniotic twin pregnancy, with PPROM on  at 2100. This morning she is doing well, denies CTX or VB. Reports good FM x2. Denies HA, CP, SOB, N/V, fevers, chills, urinary sx, changes in bowel habits.     Vital Signs Last 24 Hrs  T(C): 37 (30 Dec 2023 03:20), Max: 37.2 (29 Dec 2023 15:30)  T(F): 98.6 (30 Dec 2023 03:20), Max: 99 (29 Dec 2023 15:30)  HR: 73 (30 Dec 2023 03:20) (73 - 83)  BP: 90/55 (30 Dec 2023 03:20) (90/55 - 103/60)  RR: 18 (30 Dec 2023 03:20) (18 - 18)    Physical exam:  Gen: AAOx3, NAD  Heart: RRR, S1 S2 WNL  Lungs: CTAB  Abdomen: Soft, nontender, no distension, gravid  Ext: No calf tenderness, no swelling    BSUS: Baby A: vertex, MVP 4.59cm, BPP 8/8; baby B transverse back up head maternal right, MVP 4.56cm, BPP 8/8    LABORATORY:                        10.0   15.02 )-----------( 244      ( 29 Dec 2023 01:00 )             29.8       MEDICATIONS  (STANDING):  amoxicillin 500 milliGRAM(s) Oral every 8 hours  dextrose 5%. 1000 milliLiter(s) (50 mL/Hr) IV Continuous <Continuous>  dextrose 5%. 1000 milliLiter(s) (100 mL/Hr) IV Continuous <Continuous>  dextrose 50% Injectable 12.5 Gram(s) IV Push once  dextrose 50% Injectable 25 Gram(s) IV Push once  dextrose 50% Injectable 25 Gram(s) IV Push once  famotidine  IVPB 20 milliGRAM(s) IV Intermittent daily  glucagon  Injectable 1 milliGRAM(s) IntraMuscular once  guaiFENesin  milliGRAM(s) Oral every 12 hours  insulin lispro Injectable (ADMELOG) 6 Unit(s) SubCutaneous before breakfast  insulin lispro Injectable (ADMELOG) 6 Unit(s) SubCutaneous before dinner  insulin NPH human recombinant 6 Unit(s) SubCutaneous before breakfast  insulin NPH human recombinant 6 Unit(s) SubCutaneous before dinner    MEDICATIONS  (PRN):  dextrose Oral Gel 15 Gram(s) Oral once PRN Blood Glucose LESS THAN 70 milliGRAM(s)/deciliter PGY2 Progress Note  Gestational age: 27w2d  Hospital day: #3    HPI: Ms Lion is a [id number] at 27w2d YASMIN 24 with monochorionic/diamniotic twin pregnancy, with PPROM on  at 2100. This morning she is doing well, denies CTX or VB. Reports good FM x2. Denies HA, CP, SOB, N/V, fevers, chills, urinary sx, changes in bowel habits.     Vital Signs Last 24 Hrs  T(C): 37 (30 Dec 2023 03:20), Max: 37.2 (29 Dec 2023 15:30)  T(F): 98.6 (30 Dec 2023 03:20), Max: 99 (29 Dec 2023 15:30)  HR: 73 (30 Dec 2023 03:20) (73 - 83)  BP: 90/55 (30 Dec 2023 03:20) (90/55 - 103/60)  RR: 18 (30 Dec 2023 03:20) (18 - 18)    Physical exam:  Gen: AAOx3, NAD  Heart: RRR, S1 S2 WNL  Lungs: CTAB  Abdomen: Soft, nontender, no distension, gravid  Ext: No calf tenderness, no swelling    BSUS: Baby A: vertex, MVP 4.59cm, BPP 8/8; baby B transverse back up head maternal right, MVP 4.56cm, BPP 8/8    LABORATORY:                        10.0   15.02 )-----------( 244      ( 29 Dec 2023 01:00 )             29.8       MEDICATIONS  (STANDING):  amoxicillin 500 milliGRAM(s) Oral every 8 hours  dextrose 5%. 1000 milliLiter(s) (50 mL/Hr) IV Continuous <Continuous>  dextrose 5%. 1000 milliLiter(s) (100 mL/Hr) IV Continuous <Continuous>  dextrose 50% Injectable 12.5 Gram(s) IV Push once  dextrose 50% Injectable 25 Gram(s) IV Push once  dextrose 50% Injectable 25 Gram(s) IV Push once  famotidine  IVPB 20 milliGRAM(s) IV Intermittent daily  glucagon  Injectable 1 milliGRAM(s) IntraMuscular once  guaiFENesin  milliGRAM(s) Oral every 12 hours  insulin lispro Injectable (ADMELOG) 6 Unit(s) SubCutaneous before breakfast  insulin lispro Injectable (ADMELOG) 6 Unit(s) SubCutaneous before dinner  insulin NPH human recombinant 6 Unit(s) SubCutaneous before breakfast  insulin NPH human recombinant 6 Unit(s) SubCutaneous before dinner    MEDICATIONS  (PRN):  dextrose Oral Gel 15 Gram(s) Oral once PRN Blood Glucose LESS THAN 70 milliGRAM(s)/deciliter 23  MFM Att'g and PGY2 Progress Note  Gestational age: 27w2d  Hospital day: #3; ROM d#4.  Ms Lion is referred for MFM consultation by Dr Dobbins.     [id number] at 27w2d YASMIN 24 with monochorionic/diamniotic twin pregnancy, with PPROM on  at 2100. This morning she is doing well, denies CTX or VB. Reports good FM x2. Denies HA, CP, SOB, N/V, fevers, chills, urinary sx, changes in bowel habits.   PMHx: Denies  PSHx: D&C x1  All: NKDA  Meds: amoxicillin 500 milliGRAM(s) Oral every 8 hours            insulin lispro Injectable (ADMELOG) 6 Unit(s) SubCutaneous before breakfast            insulin lispro Injectable (ADMELOG) 6 Unit(s) SubCutaneous before dinner            insulin NPH human recombinant 6 Unit(s) SubCutaneous before breakfast            insulin NPH human recombinant 6 Unit(s) SubCutaneous before dinner            guaiFENesin  milliGRAM(s) Oral every 12 hours            famotidine  IVPB 20 milliGRAM(s) IV Intermittent daily  FHx: Denies  Social History: History of cigarette smoking prior to pregnancy. Her  does not smoke. But reports her parents smoke, they do not go there that often. Denies drugs or alcohol use.   Ob Hx: .  FT  x1.              16wk SAB vaginal delivery followed by D&C 3wk later for retained POCs  Gyn hx: Denies uterine fibroids, ovarian cysts, abnl paps, STIs  ROS: As above.     Px: Pt interviewed seated on bed, very comfortable after showering.   VS: T(C): Max: 37.2 HR:  (73 - 83); BP: (90/55 - 103/60)  RR: 18  Heart: RRR, S1 S2 WNL  Lungs: CTAB  Abdomen: Soft, nontender, no distension, gravid.  Fundus soft.  Extr: No calf tenderness, no swelling    LAB: Opos/AntibNeg; HbEP not found.   : 11k>10/31%<253  CMP normal, LFTs normal, Coags normal.   :  14k>8.6/26.4%<212k mcv 85.     BSUS: Baby A: vertex, MVP 4.59cm, BPP 8/8; baby B transverse back up head maternal right, MVP 4.56cm, BPP 8/8  MFMUS:   21w:  Fetal echos at [place], normal x2  : [facility]: MonoDi TIUP at 25w4d, Vertex/Vertex, 3% discordance.  AFVol normal both sacs.             A:  Mat Left: Vtx, plac post, DVP 5.1; eFW 730g at 12th%ile for GA, AC 15th%. BPP 8/8.             B:   Mat right: Vtx, plac post, DVP 5.6cm, EFW 705g at 8th%ile for GA, AC 24th%ile. BPP 8/8.                   Umb Art & MCA dopplers normal for both twins.   : [facility] Inpt: 26w5d: Cx Yahir 4.0 cm. Twin A: EFW 1'13", fh 140, mvp 4.7 cm.                                                             Twin B: EFW 1'14", fh 147, mvp 3.8 cm.  24: f/u pending.  23  MFM Att'g and PGY2 Progress Note  Gestational age: 27w2d  Hospital day: #3; ROM d#4.  Ms Lion is referred for MFM consultation by Dr Dobbins.     [id number] at 27w2d YASMIN 24 with monochorionic/diamniotic twin pregnancy, with PPROM on  at 2100. This morning she is doing well, denies CTX or VB. Reports good FM x2. Denies HA, CP, SOB, N/V, fevers, chills, urinary sx, changes in bowel habits.   PMHx: Denies  PSHx: D&C x1  All: NKDA  Meds: amoxicillin 500 milliGRAM(s) Oral every 8 hours            insulin lispro Injectable (ADMELOG) 6 Unit(s) SubCutaneous before breakfast            insulin lispro Injectable (ADMELOG) 6 Unit(s) SubCutaneous before dinner            insulin NPH human recombinant 6 Unit(s) SubCutaneous before breakfast            insulin NPH human recombinant 6 Unit(s) SubCutaneous before dinner            guaiFENesin  milliGRAM(s) Oral every 12 hours            famotidine  IVPB 20 milliGRAM(s) IV Intermittent daily  FHx: Denies  Social History: History of cigarette smoking prior to pregnancy. Her  does not smoke. But reports her parents smoke, they do not go there that often. Denies drugs or alcohol use.   Ob Hx: .  FT  x1.              16wk SAB vaginal delivery followed by D&C 3wk later for retained POCs  Gyn hx: Denies uterine fibroids, ovarian cysts, abnl paps, STIs  ROS: As above.     Px: Pt interviewed seated on bed, very comfortable after showering.   VS: T(C): Max: 37.2 HR:  (73 - 83); BP: (90/55 - 103/60)  RR: 18  Heart: RRR, S1 S2 WNL  Lungs: CTAB  Abdomen: Soft, nontender, no distension, gravid.  Fundus soft.  Extr: No calf tenderness, no swelling    LAB: Opos/AntibNeg; HbEP not found.   : 11k>10/31%<253  CMP normal, LFTs normal, Coags normal.   :  14k>8.6/26.4%<212k mcv 85.     BSUS: Baby A: vertex, MVP 4.59cm, BPP 8/8; baby B transverse back up head maternal right, MVP 4.56cm, BPP 8/8  MFMUS:   21w:  Fetal echos at [place], normal x2  : [facility]: MonoDi TIUP at 25w4d, Vertex/Vertex, 3% discordance.  AFVol normal both sacs.             A:  Mat Left: Vtx, plac post, DVP 5.1; eFW 730g at 12th%ile for GA, AC 15th%. BPP 8/8.             B:   Mat right: Vtx, plac post, DVP 5.6cm, EFW 705g at 8th%ile for GA, AC 24th%ile. BPP 8/8.                   Umb Art & MCA dopplers normal for both twins.   : [facility] Inpt: 26w5d: Cx Yahir 4.0 cm. Twin A: EFW 1'13", fh 140, mvp 4.7 cm.                                                             Twin B: EFW 1'14", fh 147, mvp 3.8 cm.  24: f/u pending.

## 2023-12-30 NOTE — PROGRESS NOTE ADULT - ATTENDING COMMENTS
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  Fetal monitoring has been reassuring.    We plan twice daily fetal evaluation and modified in-hospital bed rest until delivery.  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  Fetal monitoring has been reassuring.    We plan twice daily fetal evaluation and modified in-hospital bed rest until delivery.  nina

## 2023-12-30 NOTE — CHART NOTE - NSCHARTNOTEFT_GEN_A_CORE
PGY 4 MFM procedure note    Non stress test    Date:  12/30/23      Baseline:  155 beats per minute  Variability:  moderate  Accelerations: none  Decelerations: none    Tocometer: none    Tracing for twin A and B overlapping unable to clearly identify 2 individual lines, will do BPP for both twins

## 2023-12-31 LAB
ALBUMIN SERPL ELPH-MCNC: 3.3 G/DL — LOW (ref 3.5–5.2)
ALBUMIN SERPL ELPH-MCNC: 3.3 G/DL — LOW (ref 3.5–5.2)
ALP SERPL-CCNC: 111 U/L — SIGNIFICANT CHANGE UP (ref 30–115)
ALP SERPL-CCNC: 111 U/L — SIGNIFICANT CHANGE UP (ref 30–115)
ALT FLD-CCNC: 13 U/L — SIGNIFICANT CHANGE UP (ref 0–41)
ALT FLD-CCNC: 13 U/L — SIGNIFICANT CHANGE UP (ref 0–41)
ANION GAP SERPL CALC-SCNC: 11 MMOL/L — SIGNIFICANT CHANGE UP (ref 7–14)
ANION GAP SERPL CALC-SCNC: 11 MMOL/L — SIGNIFICANT CHANGE UP (ref 7–14)
AST SERPL-CCNC: 16 U/L — SIGNIFICANT CHANGE UP (ref 0–41)
AST SERPL-CCNC: 16 U/L — SIGNIFICANT CHANGE UP (ref 0–41)
BASOPHILS # BLD AUTO: 0.03 K/UL — SIGNIFICANT CHANGE UP (ref 0–0.2)
BASOPHILS # BLD AUTO: 0.03 K/UL — SIGNIFICANT CHANGE UP (ref 0–0.2)
BASOPHILS NFR BLD AUTO: 0.2 % — SIGNIFICANT CHANGE UP (ref 0–1)
BASOPHILS NFR BLD AUTO: 0.2 % — SIGNIFICANT CHANGE UP (ref 0–1)
BILIRUB SERPL-MCNC: <0.2 MG/DL — SIGNIFICANT CHANGE UP (ref 0.2–1.2)
BILIRUB SERPL-MCNC: <0.2 MG/DL — SIGNIFICANT CHANGE UP (ref 0.2–1.2)
BLD GP AB SCN SERPL QL: SIGNIFICANT CHANGE UP
BLD GP AB SCN SERPL QL: SIGNIFICANT CHANGE UP
BUN SERPL-MCNC: 9 MG/DL — LOW (ref 10–20)
BUN SERPL-MCNC: 9 MG/DL — LOW (ref 10–20)
CALCIUM SERPL-MCNC: 8.2 MG/DL — LOW (ref 8.4–10.5)
CALCIUM SERPL-MCNC: 8.2 MG/DL — LOW (ref 8.4–10.5)
CHLORIDE SERPL-SCNC: 106 MMOL/L — SIGNIFICANT CHANGE UP (ref 98–110)
CHLORIDE SERPL-SCNC: 106 MMOL/L — SIGNIFICANT CHANGE UP (ref 98–110)
CO2 SERPL-SCNC: 20 MMOL/L — SIGNIFICANT CHANGE UP (ref 17–32)
CO2 SERPL-SCNC: 20 MMOL/L — SIGNIFICANT CHANGE UP (ref 17–32)
CREAT SERPL-MCNC: 0.6 MG/DL — LOW (ref 0.7–1.5)
CREAT SERPL-MCNC: 0.6 MG/DL — LOW (ref 0.7–1.5)
EGFR: 125 ML/MIN/1.73M2 — SIGNIFICANT CHANGE UP
EGFR: 125 ML/MIN/1.73M2 — SIGNIFICANT CHANGE UP
EOSINOPHIL # BLD AUTO: 0.12 K/UL — SIGNIFICANT CHANGE UP (ref 0–0.7)
EOSINOPHIL # BLD AUTO: 0.12 K/UL — SIGNIFICANT CHANGE UP (ref 0–0.7)
EOSINOPHIL NFR BLD AUTO: 0.9 % — SIGNIFICANT CHANGE UP (ref 0–8)
EOSINOPHIL NFR BLD AUTO: 0.9 % — SIGNIFICANT CHANGE UP (ref 0–8)
GLUCOSE BLDC GLUCOMTR-MCNC: 110 MG/DL — HIGH (ref 70–99)
GLUCOSE BLDC GLUCOMTR-MCNC: 110 MG/DL — HIGH (ref 70–99)
GLUCOSE BLDC GLUCOMTR-MCNC: 111 MG/DL — HIGH (ref 70–99)
GLUCOSE BLDC GLUCOMTR-MCNC: 111 MG/DL — HIGH (ref 70–99)
GLUCOSE BLDC GLUCOMTR-MCNC: 113 MG/DL — HIGH (ref 70–99)
GLUCOSE BLDC GLUCOMTR-MCNC: 113 MG/DL — HIGH (ref 70–99)
GLUCOSE BLDC GLUCOMTR-MCNC: 115 MG/DL — HIGH (ref 70–99)
GLUCOSE BLDC GLUCOMTR-MCNC: 115 MG/DL — HIGH (ref 70–99)
GLUCOSE BLDC GLUCOMTR-MCNC: 122 MG/DL — HIGH (ref 70–99)
GLUCOSE BLDC GLUCOMTR-MCNC: 122 MG/DL — HIGH (ref 70–99)
GLUCOSE BLDC GLUCOMTR-MCNC: 153 MG/DL — HIGH (ref 70–99)
GLUCOSE BLDC GLUCOMTR-MCNC: 153 MG/DL — HIGH (ref 70–99)
GLUCOSE BLDC GLUCOMTR-MCNC: 95 MG/DL — SIGNIFICANT CHANGE UP (ref 70–99)
GLUCOSE BLDC GLUCOMTR-MCNC: 95 MG/DL — SIGNIFICANT CHANGE UP (ref 70–99)
GLUCOSE SERPL-MCNC: 90 MG/DL — SIGNIFICANT CHANGE UP (ref 70–99)
GLUCOSE SERPL-MCNC: 90 MG/DL — SIGNIFICANT CHANGE UP (ref 70–99)
HCT VFR BLD CALC: 27 % — LOW (ref 37–47)
HCT VFR BLD CALC: 27 % — LOW (ref 37–47)
HGB BLD-MCNC: 8.8 G/DL — LOW (ref 12–16)
HGB BLD-MCNC: 8.8 G/DL — LOW (ref 12–16)
IMM GRANULOCYTES NFR BLD AUTO: 2.4 % — HIGH (ref 0.1–0.3)
IMM GRANULOCYTES NFR BLD AUTO: 2.4 % — HIGH (ref 0.1–0.3)
IRON SATN MFR SERPL: 25 UG/DL — LOW (ref 35–150)
IRON SATN MFR SERPL: 25 UG/DL — LOW (ref 35–150)
IRON SATN MFR SERPL: 5 % — LOW (ref 15–50)
IRON SATN MFR SERPL: 5 % — LOW (ref 15–50)
LACTATE SERPL-SCNC: 1 MMOL/L — SIGNIFICANT CHANGE UP (ref 0.7–2)
LACTATE SERPL-SCNC: 1 MMOL/L — SIGNIFICANT CHANGE UP (ref 0.7–2)
LYMPHOCYTES # BLD AUTO: 2.65 K/UL — SIGNIFICANT CHANGE UP (ref 1.2–3.4)
LYMPHOCYTES # BLD AUTO: 2.65 K/UL — SIGNIFICANT CHANGE UP (ref 1.2–3.4)
LYMPHOCYTES # BLD AUTO: 20.9 % — SIGNIFICANT CHANGE UP (ref 20.5–51.1)
LYMPHOCYTES # BLD AUTO: 20.9 % — SIGNIFICANT CHANGE UP (ref 20.5–51.1)
MCHC RBC-ENTMCNC: 28.1 PG — SIGNIFICANT CHANGE UP (ref 27–31)
MCHC RBC-ENTMCNC: 28.1 PG — SIGNIFICANT CHANGE UP (ref 27–31)
MCHC RBC-ENTMCNC: 32.6 G/DL — SIGNIFICANT CHANGE UP (ref 32–37)
MCHC RBC-ENTMCNC: 32.6 G/DL — SIGNIFICANT CHANGE UP (ref 32–37)
MCV RBC AUTO: 86.3 FL — SIGNIFICANT CHANGE UP (ref 81–99)
MCV RBC AUTO: 86.3 FL — SIGNIFICANT CHANGE UP (ref 81–99)
MONOCYTES # BLD AUTO: 1.07 K/UL — HIGH (ref 0.1–0.6)
MONOCYTES # BLD AUTO: 1.07 K/UL — HIGH (ref 0.1–0.6)
MONOCYTES NFR BLD AUTO: 8.4 % — SIGNIFICANT CHANGE UP (ref 1.7–9.3)
MONOCYTES NFR BLD AUTO: 8.4 % — SIGNIFICANT CHANGE UP (ref 1.7–9.3)
NEUTROPHILS # BLD AUTO: 8.52 K/UL — HIGH (ref 1.4–6.5)
NEUTROPHILS # BLD AUTO: 8.52 K/UL — HIGH (ref 1.4–6.5)
NEUTROPHILS NFR BLD AUTO: 67.2 % — SIGNIFICANT CHANGE UP (ref 42.2–75.2)
NEUTROPHILS NFR BLD AUTO: 67.2 % — SIGNIFICANT CHANGE UP (ref 42.2–75.2)
NRBC # BLD: 0 /100 WBCS — SIGNIFICANT CHANGE UP (ref 0–0)
NRBC # BLD: 0 /100 WBCS — SIGNIFICANT CHANGE UP (ref 0–0)
PLATELET # BLD AUTO: 201 K/UL — SIGNIFICANT CHANGE UP (ref 130–400)
PLATELET # BLD AUTO: 201 K/UL — SIGNIFICANT CHANGE UP (ref 130–400)
PMV BLD: 10.3 FL — SIGNIFICANT CHANGE UP (ref 7.4–10.4)
PMV BLD: 10.3 FL — SIGNIFICANT CHANGE UP (ref 7.4–10.4)
POTASSIUM SERPL-MCNC: 4.1 MMOL/L — SIGNIFICANT CHANGE UP (ref 3.5–5)
POTASSIUM SERPL-MCNC: 4.1 MMOL/L — SIGNIFICANT CHANGE UP (ref 3.5–5)
POTASSIUM SERPL-SCNC: 4.1 MMOL/L — SIGNIFICANT CHANGE UP (ref 3.5–5)
POTASSIUM SERPL-SCNC: 4.1 MMOL/L — SIGNIFICANT CHANGE UP (ref 3.5–5)
PROT SERPL-MCNC: 6.1 G/DL — SIGNIFICANT CHANGE UP (ref 6–8)
PROT SERPL-MCNC: 6.1 G/DL — SIGNIFICANT CHANGE UP (ref 6–8)
RBC # BLD: 3.13 M/UL — LOW (ref 4.2–5.4)
RBC # BLD: 3.13 M/UL — LOW (ref 4.2–5.4)
RBC # FLD: 14.2 % — SIGNIFICANT CHANGE UP (ref 11.5–14.5)
RBC # FLD: 14.2 % — SIGNIFICANT CHANGE UP (ref 11.5–14.5)
SODIUM SERPL-SCNC: 137 MMOL/L — SIGNIFICANT CHANGE UP (ref 135–146)
SODIUM SERPL-SCNC: 137 MMOL/L — SIGNIFICANT CHANGE UP (ref 135–146)
TIBC SERPL-MCNC: 503 UG/DL — HIGH (ref 220–430)
TIBC SERPL-MCNC: 503 UG/DL — HIGH (ref 220–430)
UIBC SERPL-MCNC: 478 UG/DL — HIGH (ref 110–370)
UIBC SERPL-MCNC: 478 UG/DL — HIGH (ref 110–370)
WBC # BLD: 12.7 K/UL — HIGH (ref 4.8–10.8)
WBC # BLD: 12.7 K/UL — HIGH (ref 4.8–10.8)
WBC # FLD AUTO: 12.7 K/UL — HIGH (ref 4.8–10.8)
WBC # FLD AUTO: 12.7 K/UL — HIGH (ref 4.8–10.8)

## 2023-12-31 PROCEDURE — 76818 FETAL BIOPHYS PROFILE W/NST: CPT | Mod: 26

## 2023-12-31 PROCEDURE — 99233 SBSQ HOSP IP/OBS HIGH 50: CPT | Mod: 25

## 2023-12-31 RX ORDER — ACETAMINOPHEN 500 MG
1000 TABLET ORAL ONCE
Refills: 0 | Status: COMPLETED | OUTPATIENT
Start: 2023-12-31 | End: 2023-12-31

## 2023-12-31 RX ADMIN — Medication 1000 UNIT(S): at 12:37

## 2023-12-31 RX ADMIN — HUMAN INSULIN 6 UNIT(S): 100 INJECTION, SUSPENSION SUBCUTANEOUS at 09:52

## 2023-12-31 RX ADMIN — FAMOTIDINE 20 MILLIGRAM(S): 10 INJECTION INTRAVENOUS at 12:37

## 2023-12-31 RX ADMIN — IRON SUCROSE 110 MILLIGRAM(S): 20 INJECTION, SOLUTION INTRAVENOUS at 12:38

## 2023-12-31 RX ADMIN — Medication 500 MILLIGRAM(S): at 12:37

## 2023-12-31 RX ADMIN — Medication 600 MILLIGRAM(S): at 06:03

## 2023-12-31 RX ADMIN — HUMAN INSULIN 6 UNIT(S): 100 INJECTION, SUSPENSION SUBCUTANEOUS at 20:22

## 2023-12-31 RX ADMIN — Medication 500 MILLIGRAM(S): at 22:11

## 2023-12-31 RX ADMIN — Medication 1 MILLIGRAM(S): at 14:42

## 2023-12-31 RX ADMIN — Medication 500 MILLIGRAM(S): at 03:14

## 2023-12-31 RX ADMIN — Medication 600 MILLIGRAM(S): at 18:05

## 2023-12-31 RX ADMIN — Medication 1 TABLET(S): at 14:45

## 2023-12-31 RX ADMIN — Medication 400 MILLIGRAM(S): at 00:30

## 2023-12-31 RX ADMIN — Medication 500 MILLIGRAM(S): at 12:38

## 2023-12-31 RX ADMIN — ZINC SULFATE TAB 220 MG (50 MG ZINC EQUIVALENT) 220 MILLIGRAM(S): 220 (50 ZN) TAB at 14:43

## 2023-12-31 NOTE — PROGRESS NOTE ADULT - SUBJECTIVE AND OBJECTIVE BOX
23  MFM Att'g and PGY2 Progress Note  Gestational age: 27w3d  Hospital day: #4; ROM d#5  Ms Lion is referred for MFM consultation by Dr Dobbins.     [id number] at 27w3d YASMIN 24 with monochorionic/diamniotic twin pregnancy, with PPROM on  at 2100. Yesterday complained of back pain and increased leakage of fluid, transferred to L&D for continuous monitoring. This morning reports feel uncomfortable and sore, but pain improved. Continues to report periodic small trickles of fluid. Denies abdominal pain or contractions. Reports +FM x2, denies VB.  Denies HA, CP, SOB, N/V, fevers, chills, urinary sx, changes in bowel habits.     PMHx: Denies  PSHx: D&C x1  All: NKDA  Meds: amoxicillin 500 milliGRAM(s) Oral every 8 hours            insulin lispro Injectable (ADMELOG) 6 Unit(s) SubCutaneous before breakfast            insulin lispro Injectable (ADMELOG) 6 Unit(s) SubCutaneous before dinner            insulin NPH human recombinant 6 Unit(s) SubCutaneous before breakfast            insulin NPH human recombinant 6 Unit(s) SubCutaneous before dinner            guaiFENesin  milliGRAM(s) Oral every 12 hours            famotidine  IVPB 20 milliGRAM(s) IV Intermittent daily  FHx: Denies  Social History: History of cigarette smoking prior to pregnancy. Her  does not smoke. But reports her parents smoke, they do not go there that often. Denies drugs or alcohol use.   Ob Hx: .  FT  x1.              16wk SAB vaginal delivery followed by D&C 3wk later for retained POCs  Gyn hx: Denies uterine fibroids, ovarian cysts, abnl paps, STIs  ROS: As above.     General: NAAD, lying comfortably in bed  Vital Signs Last 24 Hrs  T(C): 36.7 (31 Dec 2023 07:34), Max: 37.2 (30 Dec 2023 11:57)  T(F): 98.06 (31 Dec 2023 07:34), Max: 98.9 (30 Dec 2023 11:57)  HR: 61 (31 Dec 2023 07:55) (58 - 81)  BP: 100/57 (31 Dec 2023 07:45) (74/38 - 106/55)  RR: 18 (31 Dec 2023 02:08) (18 - 18)  SpO2: 98% (31 Dec 2023 07:55) (92% - 100%)    Heart: RRR, S1 S2 WNL  Lungs: CTAB  Abdomen: Soft, nontender, no distension, gravid.  Fundus soft.  Extr: No calf tenderness, no swelling    LAB: Opos/AntibNeg; HbEP not found.   : 11k>10/31%<253  CMP normal, LFTs normal, Coags normal.   :  14k>8.6/26.4%<212k mcv 85.      BSUS: Baby A: breech, MVP 2.42, BPP 8/8; baby B vtx, MVP 4.21cm, BPP 8/8    MFMUS:   21w:  Fetal echos at [place], normal x2  : [facility]: MonoDi TIUP at 25w4d, Vertex/Vertex, 3% discordance.  AFVol normal both sacs.             A:  Mat Left: Vtx, plac post, DVP 5.1; eFW 730g at 12th%ile for GA, AC 15th%. BPP 8/8.             B:   Mat right: Vtx, plac post, DVP 5.6cm, EFW 705g at 8th%ile for GA, AC 24th%ile. BPP 8/8.                   Umb Art & MCA dopplers normal for both twins.   : [facility] Inpt: 26w5d: Cx Yahir 4.0 cm. Twin A: EFW 1'13", fh 140, mvp 4.7 cm.                                                             Twin B: EFW 1'14", fh 147, mvp 3.8 cm.  24: f/u pending.  23  MFM Att'g and PGY2 Progress Note  Gestational age: 27w3d  Hospital day: #4; ROM d#5  Ms Lion is referred for MFM consultation by Dr Dobbins.     [id number] at 27w3d YASMIN 24 with monochorionic/diamniotic twin pregnancy, with PPROM on  at 2100. Yesterday complained of back pain and increased leakage of fluid, transferred to L&D for continuous monitoring. This morning reports feel uncomfortable and sore, but pain improved. Continues to report periodic small trickles of fluid. Denies abdominal pain or contractions. Reports +FM x2, denies VB.  Denies HA, CP, SOB, N/V, fevers, chills, urinary sx, changes in bowel habits.     PMHx: Denies  PSHx: D&C x1  All: NKDA  Meds: amoxicillin 500 milliGRAM(s) Oral every 8 hours            insulin lispro Injectable (ADMELOG) 6 Unit(s) SubCutaneous before breakfast            insulin lispro Injectable (ADMELOG) 6 Unit(s) SubCutaneous before dinner            insulin NPH human recombinant 6 Unit(s) SubCutaneous before breakfast            insulin NPH human recombinant 6 Unit(s) SubCutaneous before dinner            guaiFENesin  milliGRAM(s) Oral every 12 hours            famotidine  IVPB 20 milliGRAM(s) IV Intermittent daily  FHx: Denies  Social History: History of cigarette smoking prior to pregnancy. Her  does not smoke. But reports her parents smoke, they do not go there that often. Denies drugs or alcohol use.   Ob Hx: .  FT  x1.              16wk SAB vaginal delivery followed by D&C 3wk later for retained POCs  Gyn hx: Denies uterine fibroids, ovarian cysts, abnl paps, STIs  ROS: As above.     General: NAAD, lying comfortably in bed  Vital Signs Last 24 Hrs  T(C): 36.7 (31 Dec 2023 07:34), Max: 37.2 (30 Dec 2023 11:57)  T(F): 98.06 (31 Dec 2023 07:34), Max: 98.9 (30 Dec 2023 11:57)  HR: 61 (31 Dec 2023 07:55) (58 - 81)  BP: 100/57 (31 Dec 2023 07:45) (74/38 - 106/55)  RR: 18 (31 Dec 2023 02:08) (18 - 18)  SpO2: 98% (31 Dec 2023 07:55) (92% - 100%)    Heart: RRR, S1 S2 WNL  Lungs: CTAB  Abdomen: Soft, nontender, no distension, gravid.  Fundus soft.  Extr: No calf tenderness, no swelling    LAB: Opos/AntibNeg; HbEP not found.   : 11k>10/31%<253  CMP normal, LFTs normal, Coags normal.   :  14k>8.6/26.4%<212k mcv 85.      BSUS: Baby A: breech, MVP 2.42, BPP 8/8; baby B vtx, MVP 4.21cm, BPP 8/8    MFMUS:   21w:  Fetal echos at [place], normal x2  : [facility]: MonoDi TIUP at 25w4d, Vertex/Vertex, 3% discordance.  AFVol normal both sacs.             A:  Mat Left: Vtx, plac post, DVP 5.1; eFW 730g at 12th%ile for GA, AC 15th%. BPP 8/8.             B:   Mat right: Vtx, plac post, DVP 5.6cm, EFW 705g at 8th%ile for GA, AC 24th%ile. BPP 8/8.                   Umb Art & MCA dopplers normal for both twins.   : [facility] Inpt: 26w5d: Cx Yahir 4.0 cm. Twin A: EFW 1'13", fh 140, mvp 4.7 cm.                                                             Twin B: EFW 1'14", fh 147, mvp 3.8 cm.  24: f/u pending.  23  Gestational age: 27w3d  Hospital day: #4; ROM d#5  Ms Lion is referred for MFM consultation by Dr Dobbins.     Ms. Lion is a 28 year old  at 27w3d YASMIN 24 with monochorionic/diamniotic twin pregnancy with PPROM on  at 2100.   She had TTTS and is s/p laser ablation at around 16 weeks gestation. Yesterday she complained of back pain and increased leakage of fluid, transferred to L&D for continuous monitoring.  Continues to report periodic small trickles of fluid. Denies abdominal pain or contractions. Reports +FM x2, denies VB.  Denies HA, CP, SOB, N/V, fevers, chills, urinary sx, changes in bowel habits.     PMHx: Denies  PSHx: D&C x1  All: NKDA  Meds: amoxicillin 500 milliGRAM(s) Oral every 8 hours            insulin lispro Injectable (ADMELOG) 6 Unit(s) SubCutaneous before breakfast            insulin lispro Injectable (ADMELOG) 6 Unit(s) SubCutaneous before dinner            insulin NPH human recombinant 6 Unit(s) SubCutaneous before breakfast            insulin NPH human recombinant 6 Unit(s) SubCutaneous before dinner            guaiFENesin  milliGRAM(s) Oral every 12 hours            famotidine  IVPB 20 milliGRAM(s) IV Intermittent daily  FHx: Denies  Social History: History of cigarette smoking prior to pregnancy. Her  does not smoke. But reports her parents smoke, they do not go there that often. Denies drugs or alcohol use.   Ob Hx: .  FT  x1.              16wk SAB vaginal delivery followed by D&C 3wk later for retained POCs  Gyn hx: Denies uterine fibroids, ovarian cysts, abnl paps, STIs  ROS: As above.     General: NAAD, lying comfortably in bed  Vital Signs Last 24 Hrs  T(C): 36.7 (31 Dec 2023 07:34), Max: 37.2 (30 Dec 2023 11:57)  T(F): 98.06 (31 Dec 2023 07:34), Max: 98.9 (30 Dec 2023 11:57)  HR: 61 (31 Dec 2023 07:55) (58 - 81)  BP: 100/57 (31 Dec 2023 07:45) (74/38 - 106/55)  RR: 18 (31 Dec 2023 02:08) (18 - 18)  SpO2: 98% (31 Dec 2023 07:55) (92% - 100%)    Heart: RRR, S1 S2 WNL  Lungs: CTAB  Abdomen: Soft, nontender, no distension, gravid.  Fundus soft.  Extr: No calf tenderness, no swelling    LAB: Opos/AntibNeg; HbEP not found.   : 11k>10%<253  CMP normal, LFTs normal, Coags normal.   :  14k>8.6/26.4%<212k mcv 85.      BSUS: Baby A: breech, MVP 2.42, BPP 8/8; baby B vtx, MVP 4.21cm, BPP 8/8         8.8    12.70 )-----------( 201      ( 31 Dec 2023 04:36 )             27.0                         9.0    11.63 )-----------( 238      ( 30 Dec 2023 22:55 )             27.1                         8.6    13.98 )-----------( 212      ( 30 Dec 2023 06:32 )             26.4                         10.0   15.02 )-----------( 244      ( 29 Dec 2023 01:00 )             29.8                         9.6    13.87 )-----------( 244      ( 28 Dec 2023 18:59 )             28.7           137  |  106  |  9   ----------------------------<  90  4.1   |  20  |  0.6    Ca    8.2      30 Dec 2023 22:55     TPro  6.1  /  Alb  3.3  /  TBili  <0.2  /  DBili  x   /  AST  16  /  ALT  13  /  AlkPhos  111  23      Iron with Total Binding Capacity in AM (23 @ 04:36)    Iron - Total Binding Capacity.: 503 ug/dL    % Saturation, Iron: 5 %    Iron Total: 25 ug/dL    Unsaturated Iron Binding Capacity: 478 ug/dL    CAPILLARY BLOOD GLUCOSE      POCT Blood Glucose.: 111 mg/dL (31 Dec 2023 12:34)  POCT Blood Glucose.: 95 mg/dL (31 Dec 2023 09:48)  POCT Blood Glucose.: 113 mg/dL (31 Dec 2023 06:01)  POCT Blood Glucose.: 110 mg/dL (31 Dec 2023 02:08)  POCT Blood Glucose.: 119 mg/dL (30 Dec 2023 20:56)  POCT Blood Glucose.: 105 mg/dL (30 Dec 2023 17:42)        MFMUS:   21w:  Fetal echos at [place], normal x2  : [facility]: MonoDi TIUP at 25w4d, Vertex/Vertex, 3% discordance.  AFVol normal both sacs.             A:  Mat Left: Vtx, plac post, DVP 5.1; eFW 730g at 12th%ile for GA, AC 15th%. BPP 8/8.             B:   Mat right: Vtx, plac post, DVP 5.6cm, EFW 705g at 8th%ile for GA, AC 24th%ile. BPP 8/8.                   Umb Art & MCA dopplers normal for both twins.   : [facility] Inpt: 26w5d: Cx Yahir 4.0 cm. Twin A: EFW 1'13", fh 140, mvp 4.7 cm.                                                             Twin B: EFW 1'14", fh 147, mvp 3.8 cm.  24: f/u pending.  23  Gestational age: 27w3d  Hospital day: #4; ROM d#5  Ms Lion is referred for MFM consultation by Dr Dobbins.     Ms. Lion is a 28 year old  at 27w3d YASMIN 24 with monochorionic/diamniotic twin pregnancy with PPROM on  at 2100.   She had TTTS and is s/p laser ablation at around 16 weeks gestation. Yesterday she complained of back pain and increased leakage of fluid, transferred to L&D for continuous monitoring.  Continues to report periodic small trickles of fluid. Denies abdominal pain or contractions. Reports +FM x2, denies VB.  Denies HA, CP, SOB, N/V, fevers, chills, urinary sx, changes in bowel habits.     PMHx: Denies  PSHx: D&C x1  All: NKDA  Meds: amoxicillin 500 milliGRAM(s) Oral every 8 hours            insulin lispro Injectable (ADMELOG) 6 Unit(s) SubCutaneous before breakfast            insulin lispro Injectable (ADMELOG) 6 Unit(s) SubCutaneous before dinner            insulin NPH human recombinant 6 Unit(s) SubCutaneous before breakfast            insulin NPH human recombinant 6 Unit(s) SubCutaneous before dinner            guaiFENesin  milliGRAM(s) Oral every 12 hours            famotidine  IVPB 20 milliGRAM(s) IV Intermittent daily  FHx: Denies  Social History: History of cigarette smoking prior to pregnancy. Her  does not smoke. But reports her parents smoke, they do not go there that often. Denies drugs or alcohol use.   Ob Hx: .  FT  x1.              16wk SAB vaginal delivery followed by D&C 3wk later for retained POCs  Gyn hx: Denies uterine fibroids, ovarian cysts, abnl paps, STIs  ROS: As above.     General: NAAD, lying comfortably in bed  Vital Signs Last 24 Hrs  T(C): 36.7 (31 Dec 2023 07:34), Max: 37.2 (30 Dec 2023 11:57)  T(F): 98.06 (31 Dec 2023 07:34), Max: 98.9 (30 Dec 2023 11:57)  HR: 61 (31 Dec 2023 07:55) (58 - 81)  BP: 100/57 (31 Dec 2023 07:45) (74/38 - 106/55)  RR: 18 (31 Dec 2023 02:08) (18 - 18)  SpO2: 98% (31 Dec 2023 07:55) (92% - 100%)    Heart: RRR, S1 S2 WNL  Lungs: CTAB  Abdomen: Soft, nontender, no distension, gravid.  Fundus soft.  Extr: No calf tenderness, no swelling    LAB: Opos/AntibNeg; HbEP not found.   : 11k>10%<253  CMP normal, LFTs normal, Coags normal.   :  14k>8.6/26.4%<212k mcv 85.      BSUS: Baby A: breech, MVP 2.42, BPP 8/8; baby B vtx, MVP 4.21cm, BPP 8/8         8.8    12.70 )-----------( 201      ( 31 Dec 2023 04:36 )             27.0                         9.0    11.63 )-----------( 238      ( 30 Dec 2023 22:55 )             27.1                         8.6    13.98 )-----------( 212      ( 30 Dec 2023 06:32 )             26.4                         10.0   15.02 )-----------( 244      ( 29 Dec 2023 01:00 )             29.8                         9.6    13.87 )-----------( 244      ( 28 Dec 2023 18:59 )             28.7           137  |  106  |  9   ----------------------------<  90  4.1   |  20  |  0.6    Ca    8.2      30 Dec 2023 22:55     TPro  6.1  /  Alb  3.3  /  TBili  <0.2  /  DBili  x   /  AST  16  /  ALT  13  /  AlkPhos  111  23      Iron with Total Binding Capacity in AM (23 @ 04:36)    Iron - Total Binding Capacity.: 503 ug/dL    % Saturation, Iron: 5 %    Iron Total: 25 ug/dL    Unsaturated Iron Binding Capacity: 478 ug/dL    CAPILLARY BLOOD GLUCOSE      POCT Blood Glucose.: 111 mg/dL (31 Dec 2023 12:34)  POCT Blood Glucose.: 95 mg/dL (31 Dec 2023 09:48)  POCT Blood Glucose.: 113 mg/dL (31 Dec 2023 06:01)  POCT Blood Glucose.: 110 mg/dL (31 Dec 2023 02:08)  POCT Blood Glucose.: 119 mg/dL (30 Dec 2023 20:56)  POCT Blood Glucose.: 105 mg/dL (30 Dec 2023 17:42)        MFMUS:   21w:  Fetal echos at [place], normal x2  : [facility]: MonoDi TIUP at 25w4d, Vertex/Vertex, 3% discordance.  AFVol normal both sacs.             A:  Mat Left: Vtx, plac post, DVP 5.1; eFW 730g at 12th%ile for GA, AC 15th%. BPP 8/8.             B:   Mat right: Vtx, plac post, DVP 5.6cm, EFW 705g at 8th%ile for GA, AC 24th%ile. BPP 8/8.                   Umb Art & MCA dopplers normal for both twins.   : [facility] Inpt: 26w5d: Cx Yahir 4.0 cm. Twin A: EFW 1'13", fh 140, mvp 4.7 cm.                                                             Twin B: EFW 1'14", fh 147, mvp 3.8 cm.  24: f/u pending.

## 2023-12-31 NOTE — PROGRESS NOTE ADULT - NUTRITIONAL ASSESSMENT
[facility] Staff    I saw and evaluated Ms. NABOR LION  with Dr. Kaplan.  Ms. NABOR LION reports positive fetal movement and no vaginal bleeding.  She continues to have leakage of fluid it is not foul smelling.  Last night she had an increase in her leakage of fluid and felt backpain, today she feels better.    General:  Ms. NABOR LION is lying in bed.  She appears comfortable.    Vital Signs Last 24 Hrs  T(C): 36.7 (31 Dec 2023 13:55), Max: 37.0 (30 Dec 2023 22:00)  T(F): 98.06 (31 Dec 2023 13:55), Max: 98.6 (30 Dec 2023 22:00)  HR: 77 (31 Dec 2023 13:46) (58 - 81)  BP: 102/58 (31 Dec 2023 13:46) (74/38 - 105/58)  BP(mean): --  RR: 18 (31 Dec 2023 02:08) (18 - 18)  SpO2: 98% (31 Dec 2023 08:05) (92% - 100%)    Cardiovascular:  Normal S1 S2.  No murmurs.  Pulmonary:  Clear to auscultation bilaterally.  No wheezing.  Abdomen:  Soft, nontender, nondistended, no rebound, no guarding.  No fundal tenderness.  Extremities:  Nontender calves.  Neurology:  Deep tendon reflexes 2+ bilaterally.    Labs as above.    Ms. Lion is a 29yo  at 27w3d, GBS neg, mono-di twins with PPROM .  She has a Hx TTTS at 16w, s/p laser surgery at [place].  Currently the suspicion of chorioamnionitis is low.     We discussed the increased risks of chorioamnionitis (infection in the amniotic fluid) in the context of PPROM.  We will closely monitor her vital signs, temperature, and WBC (which will initially be elevated due to  steroids), as well as her physical exam, especially fundal tenderness.  If there are concerns for chorioamnionitis we will most likely proceed towards delivery. Sometimes the signs of infection can be subtle and the diagnosis can be difficult to make.  Rarely chorioamnionitis can be life threatening requiring ICU admission. WIth noland gestations, we deliver at around 34 weeks gestation in the absence of chorioamnionitis.  Because this is a monochorionic diamniotic twin gestation, delivery planning may be modified.   We discussed that around 50% of patients with PPROM will deliver within seven days.     In addition there are increased risks of placental abruption, as well as the baby being bruised in utero due to reduction of the amniotic fluid.  There is also a possibility of cord prolapse which usually results in an emergency  delivery.      1. PPROM,  @2100; unclear which sac.  - S/p Ampicillin and Azithromycin 1g x1, currently on amoxicillin  - S/p betamethasone x2 doses over 24hr for fetal lung maturity (-)  - NICU consult done  - Monitor for signs and symptoms of chorioamnionitis.  - Follow up CBC daily.    2.  Monochorionic-Diamniotic Twins  - History of twin twin transfusion syndrome  - S/p laser ablation at [facility] at around 16 weeks gestation. variable lie at 27w2d with  premature rupture of membranes.    - PPROM is a described complication of laser ablation therapy.  - We discussed that measuring the amniotic fluid in each sac can be difficult based upon fetal position and the location of the intervening membrane.      4. Normocytic anemia: Unclear whether due to hemodilution or blood loss.    - Received IV iron infusion today  - f/u ferritin, folate level, B12 level       4. Pregnancy  - Elevated GCT, will complete GTT 10d after course of betamethasone  - In the meantime, continue NPH 6 Units before dinner and 6 Units before breakfast.                                          Lispro 6 Units before dinner and 6 Units before breakfast.   - Discontinue standing dose of insulin if FBS < 95 and 2h PP <120.   - Continue to monitor FS fasting and 2hr postprandial with ISS coverage as indicated    We will continue to monitor on labor and delivery, Continuous fetal monitoring is difficult to obtain.   testing will occur around every 4 hours (BPP twice daily, the remainder NSTs) and as clinically indicated.  At the present time in my opinion delivery is not indicated.    I discussed this with Ms. Lion.  I also discussed this plan with Dr. Dobbins by phone. [facility] Staff    I saw and evaluated Ms. NABOR LION  with Dr. Kaplan.  Ms. NABOR LION reports positive fetal movement and no vaginal bleeding.  She continues to have leakage of fluid it is not foul smelling.  Last night she had an increase in her leakage of fluid and felt backpain, today she feels better.    General:  Ms. NABOR LION is lying in bed.  She appears comfortable.    Vital Signs Last 24 Hrs  T(C): 36.7 (31 Dec 2023 13:55), Max: 37.0 (30 Dec 2023 22:00)  T(F): 98.06 (31 Dec 2023 13:55), Max: 98.6 (30 Dec 2023 22:00)  HR: 77 (31 Dec 2023 13:46) (58 - 81)  BP: 102/58 (31 Dec 2023 13:46) (74/38 - 105/58)  BP(mean): --  RR: 18 (31 Dec 2023 02:08) (18 - 18)  SpO2: 98% (31 Dec 2023 08:05) (92% - 100%)    Cardiovascular:  Normal S1 S2.  No murmurs.  Pulmonary:  Clear to auscultation bilaterally.  No wheezing.  Abdomen:  Soft, nontender, nondistended, no rebound, no guarding.  No fundal tenderness.  Extremities:  Nontender calves.  Neurology:  Deep tendon reflexes 2+ bilaterally.    Labs as above.    Ms. Lion is a 27yo  at 27w3d, GBS neg, mono-di twins with PPROM .  She has a Hx TTTS at 16w, s/p laser surgery at [place].  Currently the suspicion of chorioamnionitis is low.     We discussed the increased risks of chorioamnionitis (infection in the amniotic fluid) in the context of PPROM.  We will closely monitor her vital signs, temperature, and WBC (which will initially be elevated due to  steroids), as well as her physical exam, especially fundal tenderness.  If there are concerns for chorioamnionitis we will most likely proceed towards delivery. Sometimes the signs of infection can be subtle and the diagnosis can be difficult to make.  Rarely chorioamnionitis can be life threatening requiring ICU admission. WIth noland gestations, we deliver at around 34 weeks gestation in the absence of chorioamnionitis.  Because this is a monochorionic diamniotic twin gestation, delivery planning may be modified.   We discussed that around 50% of patients with PPROM will deliver within seven days.     In addition there are increased risks of placental abruption, as well as the baby being bruised in utero due to reduction of the amniotic fluid.  There is also a possibility of cord prolapse which usually results in an emergency  delivery.      1. PPROM,  @2100; unclear which sac.  - S/p Ampicillin and Azithromycin 1g x1, currently on amoxicillin  - S/p betamethasone x2 doses over 24hr for fetal lung maturity (-)  - NICU consult done  - Monitor for signs and symptoms of chorioamnionitis.  - Follow up CBC daily.    2.  Monochorionic-Diamniotic Twins  - History of twin twin transfusion syndrome  - S/p laser ablation at [facility] at around 16 weeks gestation. variable lie at 27w2d with  premature rupture of membranes.    - PPROM is a described complication of laser ablation therapy.  - We discussed that measuring the amniotic fluid in each sac can be difficult based upon fetal position and the location of the intervening membrane.      4. Normocytic anemia: Unclear whether due to hemodilution or blood loss.    - Received IV iron infusion today  - f/u ferritin, folate level, B12 level       4. Pregnancy  - Elevated GCT, will complete GTT 10d after course of betamethasone  - In the meantime, continue NPH 6 Units before dinner and 6 Units before breakfast.                                          Lispro 6 Units before dinner and 6 Units before breakfast.   - Discontinue standing dose of insulin if FBS < 95 and 2h PP <120.   - Continue to monitor FS fasting and 2hr postprandial with ISS coverage as indicated    We will continue to monitor on labor and delivery, Continuous fetal monitoring is difficult to obtain.   testing will occur around every 4 hours (BPP twice daily, the remainder NSTs) and as clinically indicated.  At the present time in my opinion delivery is not indicated.    I discussed this with Ms. Lion.  I also discussed this plan with Dr. Dobbins by phone.

## 2023-12-31 NOTE — PROGRESS NOTE ADULT - ASSESSMENT
A/P: 29yo  at 27w3d, GBS neg, mono-di twins with Hx TTTS at 16w, s/p laser surgery at [place], d#5 PPROM, with reassuring maternal and fetal status at this time.     1. PPROM,  @2100  - Latency antibiotics, ampicillin 2g q6hr x48hr, azithro 1g x1, followed by 500mg amoxacillin q8hr x5d, currently on amoxicillin  - S/p betamethasone x2 doses over 24hr for fetal lung maturity (-)  - Monitor for s/s of chorioamnionitis  - NICU consult done  -->currently monitored on L&D, NPO, FS q4hr while NPO, holding insulin    2.  Mono-Di Twins, variable lie at 27w2d with  premature rupture of membranes.  Unclear which sac is ruptured.  Fetal testing and assessment is very reassuring.-     3. S/p laser surgery at [facility] at 16wk GA, TTTS now resolved.  Both twins now have normal MCA dopplers.   P3ROM is a described complication of laser ablation therapy.    4. Normocytic anemia: Unclear whether due to hemodilution or blood loss.  Regardless, pt may benefit from Fe supplementation, po or IV.   -->c/w venofer, vit C, vit D  --f/u ferritin, folate level, B12 level  -->Consider hematology consult for IV venofer or po iron supplement.         4. Pregnancy  - Elevated GCT, will complete GTT 10d after course of betamethasone  -->In the meantime, continue NPH 6 Units before dinner and 6 Units before breakfast.                                           Lispro 6 Units before dinner and 6 Units before breakfast.   -->Discontinue standing dose of insulin if FBS < 95 and 2h PP <120.   --> Continue to monitor FS fasting and 2hr postprandial with ISS coverage as indicated  - Cont efm/toco. Vitals.  - Regular diet.      to be d/w Dr. Sales A/P: 29yo  at 27w3d, GBS neg, mono-di twins with Hx TTTS at 16w, s/p laser surgery at [place], d#5 PPROM, with reassuring maternal and fetal status at this time.     1. PPROM,  @2100  - Latency antibiotics, ampicillin 2g q6hr x48hr, azithro 1g x1, followed by 500mg amoxacillin q8hr x5d, currently on amoxicillin  - S/p betamethasone x2 doses over 24hr for fetal lung maturity (-)  - Monitor for s/s of chorioamnionitis  - NICU consult done  -->currently monitored on L&D, NPO, FS q4hr while NPO, holding insulin    2.  Mono-Di Twins, variable lie at 27w2d with  premature rupture of membranes.  Unclear which sac is ruptured.  Fetal testing and assessment is very reassuring.-     3. S/p laser surgery at [facility] at 16wk GA, TTTS now resolved.  Both twins now have normal MCA dopplers.   P3ROM is a described complication of laser ablation therapy.    4. Normocytic anemia: Unclear whether due to hemodilution or blood loss.  Regardless, pt may benefit from Fe supplementation, po or IV.   -->c/w venofer, vit C, vit D  --f/u ferritin, folate level, B12 level  -->Consider hematology consult for IV venofer or po iron supplement.         4. Pregnancy  - Elevated GCT, will complete GTT 10d after course of betamethasone  -->In the meantime, continue NPH 6 Units before dinner and 6 Units before breakfast.                                           Lispro 6 Units before dinner and 6 Units before breakfast.   -->Discontinue standing dose of insulin if FBS < 95 and 2h PP <120.   --> Continue to monitor FS fasting and 2hr postprandial with ISS coverage as indicated  - Cont efm/toco. Vitals.  - Regular diet.      to be d/w Dr. Sales A/P: 29yo  at 27w3d, GBS neg, mono-di twins with Hx TTTS at 16w, s/p laser surgery at [place], d#5 PPROM, with reassuring maternal and fetal status at this time.     1. PPROM,  @2100  - Latency antibiotics, ampicillin 2g q6hr x48hr, azithro 1g x1, followed by 500mg amoxacillin q8hr x5d, currently on amoxicillin  - S/p betamethasone x2 doses over 24hr for fetal lung maturity (-)  - Monitor for s/s of chorioamnionitis  - NICU consult done  -->currently monitored on L&D, NPO, FS q4hr while NPO, holding insulin    2.  Mono-Di Twins, variable lie at 27w2d with  premature rupture of membranes.  Unclear which sac is ruptured.  Fetal testing and assessment is reassuring.-     3. S/p laser surgery at [facility] at 16wk GA, TTTS now resolved.  Both twins now have normal MCA dopplers.   P3ROM is a described complication of laser ablation therapy.    4. Normocytic anemia: Unclear whether due to hemodilution or blood loss.  Regardless, pt may benefit from Fe supplementation, po or IV.   -->c/w venofer, vit C, vit D  --f/u ferritin, folate level, B12 level  -->Consider hematology consult for IV venofer or po iron supplement.         4. Pregnancy  - Elevated GCT, will complete GTT a few days after course of betamethasone  -->In the meantime, continue NPH 6 Units before dinner and 6 Units before breakfast.                                           Lispro 6 Units before dinner and 6 Units before breakfast.   -->Discontinue standing dose of insulin if FBS < 95 and 2h PP <120.   --> Continue to monitor FS fasting and 2hr postprandial with ISS coverage as indicated  - Cont efm/toco. Vitals.  - Regular diet.      to be d/w Dr. Sales A/P: 27yo  at 27w3d, GBS neg, mono-di twins with Hx TTTS at 16w, s/p laser surgery at [place], d#5 PPROM, with reassuring maternal and fetal status at this time.     1. PPROM,  @2100  - Latency antibiotics, ampicillin 2g q6hr x48hr, azithro 1g x1, followed by 500mg amoxacillin q8hr x5d, currently on amoxicillin  - S/p betamethasone x2 doses over 24hr for fetal lung maturity (-)  - Monitor for s/s of chorioamnionitis  - NICU consult done  -->currently monitored on L&D, NPO, FS q4hr while NPO, holding insulin    2.  Mono-Di Twins, variable lie at 27w2d with  premature rupture of membranes.  Unclear which sac is ruptured.  Fetal testing and assessment is reassuring.-     3. S/p laser surgery at [facility] at 16wk GA, TTTS now resolved.  Both twins now have normal MCA dopplers.   P3ROM is a described complication of laser ablation therapy.    4. Normocytic anemia: Unclear whether due to hemodilution or blood loss.  Regardless, pt may benefit from Fe supplementation, po or IV.   -->c/w venofer, vit C, vit D  --f/u ferritin, folate level, B12 level  -->Consider hematology consult for IV venofer or po iron supplement.         4. Pregnancy  - Elevated GCT, will complete GTT a few days after course of betamethasone  -->In the meantime, continue NPH 6 Units before dinner and 6 Units before breakfast.                                           Lispro 6 Units before dinner and 6 Units before breakfast.   -->Discontinue standing dose of insulin if FBS < 95 and 2h PP <120.   --> Continue to monitor FS fasting and 2hr postprandial with ISS coverage as indicated  - Cont efm/toco. Vitals.  - Regular diet.      to be d/w Dr. Sales

## 2023-12-31 NOTE — PROGRESS NOTE ADULT - ATTENDING COMMENTS
[facility] Staff    I saw and evaluated Ms. NABOR LION  with Dr. Kaplan.  Ms. NABOR LION reports positive fetal movement and no vaginal bleeding.  She continues to have leakage of fluid it is not foul smelling.  Last night she had an increase in her leakage of fluid and felt back pain, today she feels better.    General:  Ms. NABOR LION is lying in bed.  She appears comfortable.    Vital Signs Last 24 Hrs  T(C): 36.7 (31 Dec 2023 13:55), Max: 37.0 (30 Dec 2023 22:00)  T(F): 98.06 (31 Dec 2023 13:55), Max: 98.6 (30 Dec 2023 22:00)  HR: 77 (31 Dec 2023 13:46) (58 - 81)  BP: 102/58 (31 Dec 2023 13:46) (74/38 - 105/58)  BP(mean): --  RR: 18 (31 Dec 2023 02:08) (18 - 18)  SpO2: 98% (31 Dec 2023 08:05) (92% - 100%)    Cardiovascular:  Normal S1 S2.  No murmurs.  Pulmonary:  Clear to auscultation bilaterally.  No wheezing.  Abdomen:  Soft, nontender, nondistended, no rebound, no guarding.  No fundal tenderness.  Extremities:  Nontender calves.  Neurology:  Deep tendon reflexes 2+ bilaterally.    Labs as above.    Ms. Lion is a 27yo  at 27w3d, GBS neg, mono-di twins with PPROM .  She has a Hx TTTS at 16w, s/p laser surgery at [place].  Currently the suspicion of chorioamnionitis is low.     We discussed the increased risks of chorioamnionitis (infection in the amniotic fluid) in the context of PPROM.  We will closely monitor her vital signs, temperature, and WBC (which will initially be elevated due to  steroids), as well as her physical exam, especially fundal tenderness.  If there are concerns for chorioamnionitis we will most likely proceed towards delivery. Sometimes the signs of infection can be subtle and the diagnosis can be difficult to make.  Rarely chorioamnionitis can be life threatening requiring ICU admission. WIth noland gestations, we deliver at around 34 weeks gestation in the absence of chorioamnionitis.  Because this is a monochorionic diamniotic twin gestation, delivery planning may be modified.   We discussed that around 50% of patients with PPROM will deliver within seven days.     In addition there are increased risks of placental abruption, as well as the baby being bruised in utero due to reduction of the amniotic fluid.  There is also a possibility of cord prolapse which usually results in an emergency  delivery.      1. PPROM,  @2100; unclear which sac.  - S/p Ampicillin and Azithromycin 1g x1, currently on amoxicillin  - S/p betamethasone x2 doses over 24hr for fetal lung maturity (-)  - NICU consult done  - Monitor for signs and symptoms of chorioamnionitis.  - Follow up CBC tomorrow.    2.  Monochorionic-Diamniotic Twins  - History of twin twin transfusion syndrome  - S/p laser ablation at [facility] at around 16 weeks gestation. variable lie at 27w2d with  premature rupture of membranes.    - PPROM is a described complication of laser ablation therapy.  - We discussed that measuring the amniotic fluid in each sac can be difficult based upon fetal position and the location of the intervening membrane.      4. Normocytic anemia: Unclear whether due to hemodilution or blood loss.    - Received IV iron infusion today  - f/u ferritin, folate level, B12 level       4. Pregnancy  - Elevated GCT, will complete GTT 10d after course of betamethasone  - In the meantime, continue NPH 6 Units before dinner and 6 Units before breakfast.                                          Lispro 6 Units before dinner and 6 Units before breakfast.   - Discontinue standing dose of insulin if FBS < 95 and 2h PP <120.   - Continue to monitor FS fasting and 2hr postprandial with ISS coverage as indicated    We will continue to monitor on labor and delivery, Continuous fetal monitoring is difficult to obtain.   testing will occur around every 4 hours (BPP twice daily, the remainder NSTs) and as clinically indicated.  At the present time in my opinion delivery is not indicated.    I discussed this with Ms. Lion.  I also discussed this plan with Dr. Dobbins by phone.    Luisito Sales MD [facility] Staff    I saw and evaluated Ms. NABOR LION  with Dr. Kaplan.  Ms. NABOR LION reports positive fetal movement and no vaginal bleeding.  She continues to have leakage of fluid it is not foul smelling.  Last night she had an increase in her leakage of fluid and felt back pain, today she feels better.    General:  Ms. NABOR LION is lying in bed.  She appears comfortable.    Vital Signs Last 24 Hrs  T(C): 36.7 (31 Dec 2023 13:55), Max: 37.0 (30 Dec 2023 22:00)  T(F): 98.06 (31 Dec 2023 13:55), Max: 98.6 (30 Dec 2023 22:00)  HR: 77 (31 Dec 2023 13:46) (58 - 81)  BP: 102/58 (31 Dec 2023 13:46) (74/38 - 105/58)  BP(mean): --  RR: 18 (31 Dec 2023 02:08) (18 - 18)  SpO2: 98% (31 Dec 2023 08:05) (92% - 100%)    Cardiovascular:  Normal S1 S2.  No murmurs.  Pulmonary:  Clear to auscultation bilaterally.  No wheezing.  Abdomen:  Soft, nontender, nondistended, no rebound, no guarding.  No fundal tenderness.  Extremities:  Nontender calves.  Neurology:  Deep tendon reflexes 2+ bilaterally.    Labs as above.    Ms. Lion is a 27yo  at 27w3d, GBS neg, mono-di twins with PPROM .  She has a Hx TTTS at 16w, s/p laser surgery at [place].  Currently the suspicion of chorioamnionitis is low.     We discussed the increased risks of chorioamnionitis (infection in the amniotic fluid) in the context of PPROM.  We will closely monitor her vital signs, temperature, and WBC (which will initially be elevated due to  steroids), as well as her physical exam, especially fundal tenderness.  If there are concerns for chorioamnionitis we will most likely proceed towards delivery. Sometimes the signs of infection can be subtle and the diagnosis can be difficult to make.  Rarely chorioamnionitis can be life threatening requiring ICU admission. WIth noland gestations, we deliver at around 34 weeks gestation in the absence of chorioamnionitis.  Because this is a monochorionic diamniotic twin gestation, delivery planning may be modified.   We discussed that around 50% of patients with PPROM will deliver within seven days.     In addition there are increased risks of placental abruption, as well as the baby being bruised in utero due to reduction of the amniotic fluid.  There is also a possibility of cord prolapse which usually results in an emergency  delivery.      1. PPROM,  @2100; unclear which sac.  - S/p Ampicillin and Azithromycin 1g x1, currently on amoxicillin  - S/p betamethasone x2 doses over 24hr for fetal lung maturity (-)  - NICU consult done  - Monitor for signs and symptoms of chorioamnionitis.  - Follow up CBC tomorrow.    2.  Monochorionic-Diamniotic Twins  - History of twin twin transfusion syndrome  - S/p laser ablation at [facility] at around 16 weeks gestation. variable lie at 27w2d with  premature rupture of membranes.    - PPROM is a described complication of laser ablation therapy.  - We discussed that measuring the amniotic fluid in each sac can be difficult based upon fetal position and the location of the intervening membrane.      4. Normocytic anemia: Unclear whether due to hemodilution or blood loss.    - Received IV iron infusion today  - f/u ferritin, folate level, B12 level       4. Pregnancy  - Elevated GCT, will complete GTT 10d after course of betamethasone  - In the meantime, continue NPH 6 Units before dinner and 6 Units before breakfast.                                          Lispro 6 Units before dinner and 6 Units before breakfast.   - Discontinue standing dose of insulin if FBS < 95 and 2h PP <120.   - Continue to monitor FS fasting and 2hr postprandial with ISS coverage as indicated    We will continue to monitor on labor and delivery, Continuous fetal monitoring is difficult to obtain.   testing will occur around every 4 hours (BPP twice daily, the remainder NSTs) and as clinically indicated.  At the present time in my opinion delivery is not indicated.    I discussed this with Ms. Lion.  I also discussed this plan with Dr. Dobbins by phone.    Luisito Sales MD

## 2023-12-31 NOTE — PROGRESS NOTE ADULT - SUBJECTIVE AND OBJECTIVE BOX
[facility] Staff    Monochorionic diamniotic twin gestation  PPROM    Bedside Ultrasound:  Twin A vertex.  Towards the maternal left.  Deepest vertical pocket 2.67 cm.  Fetal heart rate 146 beats per minute.  Biophysical profile 10/10.                                   Twin B breech.  Towards the maternal right.  Deepest vertical pocket 2.61 cm.  Fetal heart rate 158 beats per minute.  Biophysical profile 10/10.    NST 12:05 - 13:35.  Both twins baseline 145 beats per minute, moderate variability, positive 10 x 10 accelerations, black tracing with one deceleration at 13:05.    Reassuring  testing x 2.    Luisito Sales MD                   [facility] Staff    Monochorionic diamniotic twin gestation  PPROM    Bedside Ultrasound:  Twin A vertex.  Towards the maternal left.  Deepest vertical pocket 2.67 cm.  Fetal heart rate 146 beats per minute.  Biophysical profile 10/10.                                   Twin B breech.  Towards the maternal right.  Deepest vertical pocket 2.61 cm.  Fetal heart rate 158 beats per minute.  Biophysical profile 10/10.    NST 12:05 - 13:35.  Both twins baseline 145 beats per minute, moderate variability, positive 10 x 10 accelerations, black tracing with one deceleration at 13:05.    Reassuring  testing x 2.    Luisito Sales MD

## 2024-01-01 LAB
ALBUMIN SERPL ELPH-MCNC: 3.3 G/DL — LOW (ref 3.5–5.2)
ALBUMIN SERPL ELPH-MCNC: 3.3 G/DL — LOW (ref 3.5–5.2)
ALP SERPL-CCNC: 106 U/L — SIGNIFICANT CHANGE UP (ref 30–115)
ALP SERPL-CCNC: 106 U/L — SIGNIFICANT CHANGE UP (ref 30–115)
ALT FLD-CCNC: 13 U/L — SIGNIFICANT CHANGE UP (ref 0–41)
ALT FLD-CCNC: 13 U/L — SIGNIFICANT CHANGE UP (ref 0–41)
ANION GAP SERPL CALC-SCNC: 11 MMOL/L — SIGNIFICANT CHANGE UP (ref 7–14)
ANION GAP SERPL CALC-SCNC: 11 MMOL/L — SIGNIFICANT CHANGE UP (ref 7–14)
AST SERPL-CCNC: 16 U/L — SIGNIFICANT CHANGE UP (ref 0–41)
AST SERPL-CCNC: 16 U/L — SIGNIFICANT CHANGE UP (ref 0–41)
BASOPHILS # BLD AUTO: 0.04 K/UL — SIGNIFICANT CHANGE UP (ref 0–0.2)
BASOPHILS # BLD AUTO: 0.04 K/UL — SIGNIFICANT CHANGE UP (ref 0–0.2)
BASOPHILS NFR BLD AUTO: 0.3 % — SIGNIFICANT CHANGE UP (ref 0–1)
BASOPHILS NFR BLD AUTO: 0.3 % — SIGNIFICANT CHANGE UP (ref 0–1)
BILIRUB SERPL-MCNC: <0.2 MG/DL — SIGNIFICANT CHANGE UP (ref 0.2–1.2)
BILIRUB SERPL-MCNC: <0.2 MG/DL — SIGNIFICANT CHANGE UP (ref 0.2–1.2)
BUN SERPL-MCNC: 8 MG/DL — LOW (ref 10–20)
BUN SERPL-MCNC: 8 MG/DL — LOW (ref 10–20)
CALCIUM SERPL-MCNC: 8.6 MG/DL — SIGNIFICANT CHANGE UP (ref 8.4–10.5)
CALCIUM SERPL-MCNC: 8.6 MG/DL — SIGNIFICANT CHANGE UP (ref 8.4–10.5)
CHLORIDE SERPL-SCNC: 105 MMOL/L — SIGNIFICANT CHANGE UP (ref 98–110)
CHLORIDE SERPL-SCNC: 105 MMOL/L — SIGNIFICANT CHANGE UP (ref 98–110)
CO2 SERPL-SCNC: 23 MMOL/L — SIGNIFICANT CHANGE UP (ref 17–32)
CO2 SERPL-SCNC: 23 MMOL/L — SIGNIFICANT CHANGE UP (ref 17–32)
CREAT SERPL-MCNC: 0.5 MG/DL — LOW (ref 0.7–1.5)
CREAT SERPL-MCNC: 0.5 MG/DL — LOW (ref 0.7–1.5)
EGFR: 131 ML/MIN/1.73M2 — SIGNIFICANT CHANGE UP
EGFR: 131 ML/MIN/1.73M2 — SIGNIFICANT CHANGE UP
EOSINOPHIL # BLD AUTO: 0.23 K/UL — SIGNIFICANT CHANGE UP (ref 0–0.7)
EOSINOPHIL # BLD AUTO: 0.23 K/UL — SIGNIFICANT CHANGE UP (ref 0–0.7)
EOSINOPHIL NFR BLD AUTO: 1.9 % — SIGNIFICANT CHANGE UP (ref 0–8)
EOSINOPHIL NFR BLD AUTO: 1.9 % — SIGNIFICANT CHANGE UP (ref 0–8)
FERRITIN SERPL-MCNC: 10 NG/ML — LOW (ref 15–150)
FERRITIN SERPL-MCNC: 10 NG/ML — LOW (ref 15–150)
FOLATE SERPL-MCNC: 11.6 NG/ML — SIGNIFICANT CHANGE UP
FOLATE SERPL-MCNC: 11.6 NG/ML — SIGNIFICANT CHANGE UP
GLUCOSE BLDC GLUCOMTR-MCNC: 109 MG/DL — HIGH (ref 70–99)
GLUCOSE BLDC GLUCOMTR-MCNC: 109 MG/DL — HIGH (ref 70–99)
GLUCOSE BLDC GLUCOMTR-MCNC: 111 MG/DL — HIGH (ref 70–99)
GLUCOSE BLDC GLUCOMTR-MCNC: 111 MG/DL — HIGH (ref 70–99)
GLUCOSE BLDC GLUCOMTR-MCNC: 123 MG/DL — HIGH (ref 70–99)
GLUCOSE BLDC GLUCOMTR-MCNC: 123 MG/DL — HIGH (ref 70–99)
GLUCOSE BLDC GLUCOMTR-MCNC: 152 MG/DL — HIGH (ref 70–99)
GLUCOSE BLDC GLUCOMTR-MCNC: 152 MG/DL — HIGH (ref 70–99)
GLUCOSE BLDC GLUCOMTR-MCNC: 96 MG/DL — SIGNIFICANT CHANGE UP (ref 70–99)
GLUCOSE BLDC GLUCOMTR-MCNC: 96 MG/DL — SIGNIFICANT CHANGE UP (ref 70–99)
GLUCOSE SERPL-MCNC: 81 MG/DL — SIGNIFICANT CHANGE UP (ref 70–99)
GLUCOSE SERPL-MCNC: 81 MG/DL — SIGNIFICANT CHANGE UP (ref 70–99)
HCT VFR BLD CALC: 27.9 % — LOW (ref 37–47)
HCT VFR BLD CALC: 27.9 % — LOW (ref 37–47)
HGB BLD-MCNC: 9.3 G/DL — LOW (ref 12–16)
HGB BLD-MCNC: 9.3 G/DL — LOW (ref 12–16)
IMM GRANULOCYTES NFR BLD AUTO: 2.7 % — HIGH (ref 0.1–0.3)
IMM GRANULOCYTES NFR BLD AUTO: 2.7 % — HIGH (ref 0.1–0.3)
LYMPHOCYTES # BLD AUTO: 17.4 % — LOW (ref 20.5–51.1)
LYMPHOCYTES # BLD AUTO: 17.4 % — LOW (ref 20.5–51.1)
LYMPHOCYTES # BLD AUTO: 2.16 K/UL — SIGNIFICANT CHANGE UP (ref 1.2–3.4)
LYMPHOCYTES # BLD AUTO: 2.16 K/UL — SIGNIFICANT CHANGE UP (ref 1.2–3.4)
MCHC RBC-ENTMCNC: 28.4 PG — SIGNIFICANT CHANGE UP (ref 27–31)
MCHC RBC-ENTMCNC: 28.4 PG — SIGNIFICANT CHANGE UP (ref 27–31)
MCHC RBC-ENTMCNC: 33.3 G/DL — SIGNIFICANT CHANGE UP (ref 32–37)
MCHC RBC-ENTMCNC: 33.3 G/DL — SIGNIFICANT CHANGE UP (ref 32–37)
MCV RBC AUTO: 85.1 FL — SIGNIFICANT CHANGE UP (ref 81–99)
MCV RBC AUTO: 85.1 FL — SIGNIFICANT CHANGE UP (ref 81–99)
MONOCYTES # BLD AUTO: 0.97 K/UL — HIGH (ref 0.1–0.6)
MONOCYTES # BLD AUTO: 0.97 K/UL — HIGH (ref 0.1–0.6)
MONOCYTES NFR BLD AUTO: 7.8 % — SIGNIFICANT CHANGE UP (ref 1.7–9.3)
MONOCYTES NFR BLD AUTO: 7.8 % — SIGNIFICANT CHANGE UP (ref 1.7–9.3)
NEUTROPHILS # BLD AUTO: 8.67 K/UL — HIGH (ref 1.4–6.5)
NEUTROPHILS # BLD AUTO: 8.67 K/UL — HIGH (ref 1.4–6.5)
NEUTROPHILS NFR BLD AUTO: 69.9 % — SIGNIFICANT CHANGE UP (ref 42.2–75.2)
NEUTROPHILS NFR BLD AUTO: 69.9 % — SIGNIFICANT CHANGE UP (ref 42.2–75.2)
NRBC # BLD: 0 /100 WBCS — SIGNIFICANT CHANGE UP (ref 0–0)
NRBC # BLD: 0 /100 WBCS — SIGNIFICANT CHANGE UP (ref 0–0)
PLATELET # BLD AUTO: 222 K/UL — SIGNIFICANT CHANGE UP (ref 130–400)
PLATELET # BLD AUTO: 222 K/UL — SIGNIFICANT CHANGE UP (ref 130–400)
PMV BLD: 10.7 FL — HIGH (ref 7.4–10.4)
PMV BLD: 10.7 FL — HIGH (ref 7.4–10.4)
POTASSIUM SERPL-MCNC: 3.8 MMOL/L — SIGNIFICANT CHANGE UP (ref 3.5–5)
POTASSIUM SERPL-MCNC: 3.8 MMOL/L — SIGNIFICANT CHANGE UP (ref 3.5–5)
POTASSIUM SERPL-SCNC: 3.8 MMOL/L — SIGNIFICANT CHANGE UP (ref 3.5–5)
POTASSIUM SERPL-SCNC: 3.8 MMOL/L — SIGNIFICANT CHANGE UP (ref 3.5–5)
PROT SERPL-MCNC: 5.6 G/DL — LOW (ref 6–8)
PROT SERPL-MCNC: 5.6 G/DL — LOW (ref 6–8)
RBC # BLD: 3.28 M/UL — LOW (ref 4.2–5.4)
RBC # BLD: 3.28 M/UL — LOW (ref 4.2–5.4)
RBC # FLD: 13.8 % — SIGNIFICANT CHANGE UP (ref 11.5–14.5)
RBC # FLD: 13.8 % — SIGNIFICANT CHANGE UP (ref 11.5–14.5)
SODIUM SERPL-SCNC: 139 MMOL/L — SIGNIFICANT CHANGE UP (ref 135–146)
SODIUM SERPL-SCNC: 139 MMOL/L — SIGNIFICANT CHANGE UP (ref 135–146)
VIT B12 SERPL-MCNC: 296 PG/ML — SIGNIFICANT CHANGE UP (ref 232–1245)
VIT B12 SERPL-MCNC: 296 PG/ML — SIGNIFICANT CHANGE UP (ref 232–1245)
WBC # BLD: 12.4 K/UL — HIGH (ref 4.8–10.8)
WBC # BLD: 12.4 K/UL — HIGH (ref 4.8–10.8)
WBC # FLD AUTO: 12.4 K/UL — HIGH (ref 4.8–10.8)
WBC # FLD AUTO: 12.4 K/UL — HIGH (ref 4.8–10.8)

## 2024-01-01 PROCEDURE — 99233 SBSQ HOSP IP/OBS HIGH 50: CPT | Mod: 25

## 2024-01-01 PROCEDURE — 76818 FETAL BIOPHYS PROFILE W/NST: CPT | Mod: 26,59

## 2024-01-01 RX ORDER — SODIUM CHLORIDE 9 MG/ML
1000 INJECTION, SOLUTION INTRAVENOUS
Refills: 0 | Status: DISCONTINUED | OUTPATIENT
Start: 2024-01-01 | End: 2024-01-02

## 2024-01-01 RX ORDER — SODIUM CHLORIDE 0.65 %
1 AEROSOL, SPRAY (ML) NASAL DAILY
Refills: 0 | Status: DISCONTINUED | OUTPATIENT
Start: 2024-01-01 | End: 2024-01-02

## 2024-01-01 RX ORDER — HUMAN INSULIN 100 [IU]/ML
8 INJECTION, SUSPENSION SUBCUTANEOUS
Refills: 0 | Status: DISCONTINUED | OUTPATIENT
Start: 2024-01-01 | End: 2024-01-02

## 2024-01-01 RX ADMIN — FAMOTIDINE 20 MILLIGRAM(S): 10 INJECTION INTRAVENOUS at 12:08

## 2024-01-01 RX ADMIN — Medication 500 MILLIGRAM(S): at 06:48

## 2024-01-01 RX ADMIN — HUMAN INSULIN 6 UNIT(S): 100 INJECTION, SUSPENSION SUBCUTANEOUS at 08:30

## 2024-01-01 RX ADMIN — Medication 1 SPRAY(S): at 16:28

## 2024-01-01 RX ADMIN — Medication 1 TABLET(S): at 12:07

## 2024-01-01 RX ADMIN — Medication 500 MILLIGRAM(S): at 22:09

## 2024-01-01 RX ADMIN — Medication 500 MILLIGRAM(S): at 14:12

## 2024-01-01 RX ADMIN — Medication 1 MILLIGRAM(S): at 12:08

## 2024-01-01 RX ADMIN — Medication 600 MILLIGRAM(S): at 06:48

## 2024-01-01 RX ADMIN — Medication 600 MILLIGRAM(S): at 18:15

## 2024-01-01 RX ADMIN — ZINC SULFATE TAB 220 MG (50 MG ZINC EQUIVALENT) 220 MILLIGRAM(S): 220 (50 ZN) TAB at 12:08

## 2024-01-01 RX ADMIN — Medication 6 UNIT(S): at 16:28

## 2024-01-01 RX ADMIN — HUMAN INSULIN 8 UNIT(S): 100 INJECTION, SUSPENSION SUBCUTANEOUS at 16:27

## 2024-01-01 RX ADMIN — Medication 1000 UNIT(S): at 12:08

## 2024-01-01 RX ADMIN — Medication 500 MILLIGRAM(S): at 12:08

## 2024-01-01 NOTE — PROGRESS NOTE ADULT - SUBJECTIVE AND OBJECTIVE BOX
2024  Gestational age: 27w4d  Hospital day: #5; ROM d#6  Ms Bello is referred for MFM consultation by Dr Dobbins.     Ms. Bello is a 28 year old  at 27w4d YASMIN 24 with monochorionic/diamniotic twin pregnancy with PPROM on  at 2100.   She had TTTS and is s/p laser ablation at around 16 weeks gestation.  On 2023 she complained of back pain and increased leakage of fluid, transferred to L&D for continuous monitoring.  She had cramping earlier this morning which has since resolved.  She does not report leakage of vaginal fluid.  Reports +FM x2, denies VB.  Denies HA, CP, SOB, N/V, fevers, chills, urinary sx, changes in bowel habits.     PMHx: Denies  PSHx: D&C x1  All: NKDA  Meds: amoxicillin 500 milliGRAM(s) Oral every 8 hours            insulin lispro Injectable (ADMELOG) 6 Unit(s) SubCutaneous before breakfast            insulin lispro Injectable (ADMELOG) 6 Unit(s) SubCutaneous before dinner            insulin NPH human recombinant 6 Unit(s) SubCutaneous before breakfast            insulin NPH human recombinant 6 Unit(s) SubCutaneous before dinner            guaiFENesin  milliGRAM(s) Oral every 12 hours            famotidine  IVPB 20 milliGRAM(s) IV Intermittent daily  FHx: Denies  Social History: History of cigarette smoking prior to pregnancy. Her  does not smoke. But reports her parents smoke, they do not go there that often. Denies drugs or alcohol use.   Ob Hx: .  FT  x1.              16wk SAB vaginal delivery followed by D&C 3wk later for retained POCs  Gyn hx: Denies uterine fibroids, ovarian cysts, abnl paps, STIs  ROS: As above.     General:  Ms. NABOR BELLO is lying in bed.  She appears comfortable.    Vital Signs Last 24 Hrs  T(C): 36.7 (2024 08:29), Max: 37.8 (2024 06:55)  T(F): 98.06 (2024 08:29), Max: 100.04 (2024 06:55)  HR: 90 (2024 12:29) (64 - 95)  BP: 100/57 (2024 06:48) (100/57 - 115/56)  BP(mean): --  RR: 18 (31 Dec 2023 18:44) (18 - 18)  SpO2: 98% (2024 12:29) (96% - 100%)    Abdomen:  Soft, nontender, nondistended, no rebound, no guarding.  No fundal tenderness.  Extremities:  Nontender calves.                          8.8    12.70 )-----------( 201      ( 31 Dec 2023 04:36 )             27.0                         9.0    11.63 )-----------( 238      ( 30 Dec 2023 22:55 )             27.1                         8.6    13.98 )-----------( 212      ( 30 Dec 2023 06:32 )             26.4       12-30    137  |  106  |  9   ----------------------------<  90  4.1   |  20  |  0.6    Ca    8.2      30 Dec 2023 22:55   Mg    x   30 Dec 2023 22:55  Phos    x    30 Dec 2023 22:55    TPro  6.1  /  Alb  3.3  /  TBili  <0.2  /  DBili  x   /  AST  16  /  ALT  13  /  AlkPhos  111  23      CAPILLARY BLOOD GLUCOSE      POCT Blood Glucose.: 96 mg/dL (2024 12:12)  POCT Blood Glucose.: 109 mg/dL (2024 07:59)  POCT Blood Glucose.: 122 mg/dL (31 Dec 2023 22:34)  POCT Blood Glucose.: 153 mg/dL (31 Dec 2023 20:17)  POCT Blood Glucose.: 115 mg/dL (31 Dec 2023 17:43)      Ferritin in AM (23 @ 04:36)    Ferritin: 10 ng/mL    Folate, Serum in AM (23 @ 04:36)    Folate, Serum: 11.6 ng/mL    Vitamin B12, Serum in AM (23 @ 04:36)    Vitamin B12, Serum: 296 pg/mL

## 2024-01-01 NOTE — CHART NOTE - NSCHARTNOTEFT_GEN_A_CORE
PGY2 Note    Patient seen at bedside for FHR decel of baby A to liane of 60 at 0945 for 5 min, recovered after maternal repositioning. NPO since 0200. Will keep NPO and continue to monitor.    Case discussed with Dr. Rousseau and Dr. Sales.

## 2024-01-01 NOTE — PROGRESS NOTE ADULT - SUBJECTIVE AND OBJECTIVE BOX
PGY2 Note    Gestational age: 27w4d  Hospital day: #5; ROM d#6    Patient seen at bedside. Denies abdominal pain or contractions. Reports +FM x2, denies VB. Denies current leakage of fluids. Denies HA, CP, SOB, N/V, fevers, chills, urinary sx, changes in bowel habits.     Vital Signs Last 24 Hrs  T(C): 37.0 (01 Jan 2024 00:26), Max: 37.0 (01 Jan 2024 00:26)  T(F): 98.6 (01 Jan 2024 00:26), Max: 98.6 (01 Jan 2024 00:26)  HR: 81 (01 Jan 2024 06:48) (61 - 81)  BP: 100/57 (01 Jan 2024 06:48) (74/38 - 115/56)  RR: 18 (31 Dec 2023 18:44) (18 - 18)  SpO2: 98% (31 Dec 2023 08:05) (98% - 99%)    Physical Exam  General: NAD, lying comfortably in bed  Heart: RRR, S1 S2 WNL  Lungs: CTAB  Abdomen: Soft, nontender, no distension, gravid.  Fundus soft.  Extr: No calf tenderness, no swelling    LAB:                         8.8    12.70 )-----------( 201      ( 31 Dec 2023 04:36 )             27.0   12-30    137  |  106  |  9<L>  ----------------------------<  90  4.1   |  20  |  0.6<L>    Ca    8.2<L>      30 Dec 2023 22:55    TPro  6.1  /  Alb  3.3<L>  /  TBili  <0.2  /  DBili  x   /  AST  16  /  ALT  13  /  AlkPhos  111  12-30

## 2024-01-01 NOTE — PROGRESS NOTE ADULT - SUBJECTIVE AND OBJECTIVE BOX
[facility] Staff    Monochorionic diamniotic twin gestation  PPROM    Bedside Ultrasound:  Twin A vertex.  Towards the maternal left.  Deepest vertical pocket 2.67 cm.  Fetal heart rate 146 beats per minute.  Biophysical profile 10/10.                                   Twin B transverse head to the maternal right.  Towards the maternal right.  Deepest vertical pocket 2.61 cm.  Fetal heart rate 155 beats per minute.  Biophysical profile 10/10.    NST 11:00 - 12:00.  Red (twin b) Baseline 135 beats per minute, moderate variability, positive 10 x 10 accelerations, black tracing with one deceleration at 13:05.  Black (twin a)  Baseline 140 beats per minute, moderate variability, positive 10 x 10 accelerations, occasional variables.    Reassuring  testing x 2.    Luisito Sales MD [facility] Staff    Monochorionic diamniotic twin gestation  PPROM    Bedside Ultrasound:  Twin A vertex.  Towards the maternal left.  Deepest vertical pocket 2.67 cm.  Fetal heart rate 146 beats per minute.  Biophysical profile 10/10.                                   Twin B transverse head to the maternal right.  Towards the maternal right.  Deepest vertical pocket 2.61 cm.  Fetal heart rate 155 beats per minute.  Biophysical profile 10/10.    NST 11:00 - 12:00.  Red (twin b) Baseline 135 beats per minute, moderate variability, positive 10 x 10 accelerations, black tracing with one deceleration at 13:05.  Black (twin a)  Baseline 140 beats per minute, moderate variability, positive 10 x 10 accelerations, occasional variables.    Reassuring  testing x 2.    Luisito Sales MD [facility] Staff    Monochorionic diamniotic twin gestation  PPROM    Bedside Ultrasound:  Twin A vertex.  Towards the maternal left.  Deepest vertical pocket 2.31 cm.  Fetal heart rate 146 beats per minute.  Biophysical profile 10/10.                                   Twin B transverse head to the maternal right.  Towards the maternal right.  Deepest vertical pocket 3.25 cm.  Fetal heart rate 155 beats per minute.  Biophysical profile 10/10.    NST 11:00 - 12:00.  Red (twin b) Baseline 135 beats per minute, moderate variability, positive 10 x 10 accelerations, black tracing with one deceleration at 13:05.  Black (twin a)  Baseline 140 beats per minute, moderate variability, positive 10 x 10 accelerations, occasional variables.    Reassuring  testing x 2.    Luisito Sales MD [facility] Staff    Monochorionic diamniotic twin gestation  PPROM    Bedside Ultrasound:  Twin A vertex.  Towards the maternal left.  Deepest vertical pocket 2.31 cm.  Fetal heart rate 146 beats per minute.  Biophysical profile 10/10.                                   Twin B transverse head to the maternal right.  Towards the maternal right.  Deepest vertical pocket 3.25 cm.  Fetal heart rate 155 beats per minute.  Biophysical profile 10/10.    NST 11:00 - 12:00.  Red (twin b) Baseline 135 beats per minute, moderate variability, positive 10 x 10 accelerations, black tracing with one deceleration at 13:05.  Black (twin a)  Baseline 140 beats per minute, moderate variability, positive 10 x 10 accelerations, occasional variables.    Reassuring  testing x 2.    Luisito Sales MD

## 2024-01-01 NOTE — CHART NOTE - NSCHARTNOTEFT_GEN_A_CORE
PGY2 Note    Patient seen at bedside for FHR decel of baby A to liane of 60 at 0726 for 3 min, recovered after maternal repositioning, application of maternal O2. BSS: baby A - vertex, baby B - breech. NPO since 0200. Will keep NPO and continue to monitor.    Case discussed with Dr. Rousseau and Dr. Sales PGY2 Note    Patient seen at bedside for FHR decel of baby A to liane of 60 at 0726 for 3 min, recovered after maternal repositioning, application of maternal O2. BSS: baby A - vertex, BPP 8/8; baby B - breech, BPP 8/8. NPO since 0200. Will keep NPO and continue to monitor.    Case discussed with Dr. Rousseau and Dr. Sales

## 2024-01-01 NOTE — PROGRESS NOTE ADULT - ASSESSMENT
Ms. Lion is a 29yo  at 27w4d, GBS neg, mono-di twins with PPROM .  She has a Hx TTTS at 16w, s/p laser surgery at [place].  Currently the suspicion of chorioamnionitis is low.       1. PPROM,  @2100; unclear which sac.  - S/p Ampicillin and Azithromycin 1g x1, currently on amoxicillin  - S/p betamethasone x2 doses over 24hr for fetal lung maturity (-)  - NICU consult done  - Monitor for signs and symptoms of chorioamnionitis.  - Follow up CBC today and tomorrow.    2.  Monochorionic-Diamniotic Twins  - History of twin twin transfusion syndrome  - S/p laser ablation at [facility] at around 16 weeks gestation. variable lie at 27w2d with  premature rupture of membranes.    - PPROM is a described complication of laser ablation therapy.  - We discussed that measuring the amniotic fluid in each sac can be difficult based upon fetal position and the location of the intervening membrane.    3. Normocytic anemia:   - Low ferretin  - Received IV iron infusion 2023       4. Pregnancy  - Elevated GCT, will complete GTT 10d after course of betamethasone  - In the meantime, continue NPH 6 Units before breakfast, increase NPH to 8 units before dinner.                                          Lispro 6 Units before dinner and 6 Units before breakfast.   - Discontinue standing dose of insulin if FBS < 95 and 2h PP <120.   - Continue to monitor FS fasting and 2hr postprandial with ISS coverage as indicated    We will continue to monitor on labor and delivery, Occasional variables are noted on the fetal heart tracing so we will attempt continuous fetal monitoring.     I discussed this with Ms. Lion.     Luisito Sales MD Ms. Lion is a 27yo  at 27w4d, GBS neg, mono-di twins with PPROM .  She has a Hx TTTS at 16w, s/p laser surgery at [place].  Currently the suspicion of chorioamnionitis is low.       1. PPROM,  @2100; unclear which sac.  - S/p Ampicillin and Azithromycin 1g x1, currently on amoxicillin  - S/p betamethasone x2 doses over 24hr for fetal lung maturity (-)  - NICU consult done  - Monitor for signs and symptoms of chorioamnionitis.  - Follow up CBC today and tomorrow.    2.  Monochorionic-Diamniotic Twins  - History of twin twin transfusion syndrome  - S/p laser ablation at [facility] at around 16 weeks gestation. variable lie at 27w2d with  premature rupture of membranes.    - PPROM is a described complication of laser ablation therapy.  - We discussed that measuring the amniotic fluid in each sac can be difficult based upon fetal position and the location of the intervening membrane.    3. Normocytic anemia:   - Low ferretin  - Received IV iron infusion 2023       4. Pregnancy  - Elevated GCT, will complete GTT 10d after course of betamethasone  - In the meantime, continue NPH 6 Units before breakfast, increase NPH to 8 units before dinner.                                          Lispro 6 Units before dinner and 6 Units before breakfast.   - Discontinue standing dose of insulin if FBS < 95 and 2h PP <120.   - Continue to monitor FS fasting and 2hr postprandial with ISS coverage as indicated    We will continue to monitor on labor and delivery, Occasional variables are noted on the fetal heart tracing so we will attempt continuous fetal monitoring.     I discussed this with Ms. Lion.     Luisito Sales MD

## 2024-01-01 NOTE — PROGRESS NOTE ADULT - ASSESSMENT
A/P: 29yo  at 27w4d, GBS neg, mono-di twins with Hx TTTS at 16w, s/p laser surgery at [place], d#6 PPROM, with reassuring maternal and fetal status at this time.     1. PPROM,  @2100  - Latency antibiotics, ampicillin 2g q6hr x48hr, azithro 1g x1, followed by 500mg amoxacillin q8hr x5d, currently on amoxicillin  - S/p betamethasone x2 doses over 24hr for fetal lung maturity (-)  - Monitor for s/s of chorioamnionitis  - NICU consult done  -->currently monitored on L&D, NPO, FS q4hr while NPO, holding insulin    2.  Mono-Di Twins, variable lie at 27w4d with  premature rupture of membranes.  Unclear which sac is ruptured.  Fetal testing and assessment is reassuring.-     3. S/p laser surgery at [facility] at 16wk GA, TTTS now resolved.  Both twins now have normal MCA dopplers.   P3ROM is a described complication of laser ablation therapy.    4. Normocytic anemia: Unclear whether due to hemodilution or blood loss.  Regardless, pt may benefit from Fe supplementation, po or IV.   -->c/w venofer, vit C, vit D  -->Consider hematology consult for IV venofer or po iron supplement.         4. Pregnancy  - Elevated GCT, will complete GTT a few days after course of betamethasone  -->In the meantime, continue NPH 6 Units before dinner and 6 Units before breakfast.                                           Lispro 6 Units before dinner and 6 Units before breakfast.   -->Discontinue standing dose of insulin if FBS < 95 and 2h PP <120.   --> Continue to monitor FS fasting and 2hr postprandial with ISS coverage as indicated  - Cont efm/toco. Vitals.  - Regular diet.      Case discussed with Dr. Rousseau and Dr. Dobbins A/P: 27yo  at 27w4d, GBS neg, mono-di twins with Hx TTTS at 16w, s/p laser surgery at [place], d#6 PPROM, with reassuring maternal and fetal status at this time.     1. PPROM,  @2100  - Latency antibiotics, ampicillin 2g q6hr x48hr, azithro 1g x1, followed by 500mg amoxacillin q8hr x5d, currently on amoxicillin  - S/p betamethasone x2 doses over 24hr for fetal lung maturity (-)  - Monitor for s/s of chorioamnionitis  - NICU consult done  -->currently monitored on L&D, NPO, FS q4hr while NPO, holding insulin    2.  Mono-Di Twins, variable lie at 27w4d with  premature rupture of membranes.  Unclear which sac is ruptured.  Fetal testing and assessment is reassuring.-     3. S/p laser surgery at [facility] at 16wk GA, TTTS now resolved.  Both twins now have normal MCA dopplers.   P3ROM is a described complication of laser ablation therapy.    4. Normocytic anemia: Unclear whether due to hemodilution or blood loss.  Regardless, pt may benefit from Fe supplementation, po or IV.   -->c/w venofer, vit C, vit D  -->Consider hematology consult for IV venofer or po iron supplement.         4. Pregnancy  - Elevated GCT, will complete GTT a few days after course of betamethasone  -->In the meantime, continue NPH 6 Units before dinner and 6 Units before breakfast.                                           Lispro 6 Units before dinner and 6 Units before breakfast.   -->Discontinue standing dose of insulin if FBS < 95 and 2h PP <120.   --> Continue to monitor FS fasting and 2hr postprandial with ISS coverage as indicated  - Cont efm/toco. Vitals.  - Regular diet.      Case discussed with Dr. Rousseau and Dr. Dobbins

## 2024-01-02 ENCOUNTER — APPOINTMENT (OUTPATIENT)
Dept: ANTEPARTUM | Facility: CLINIC | Age: 29
End: 2024-01-02

## 2024-01-02 ENCOUNTER — TRANSCRIPTION ENCOUNTER (OUTPATIENT)
Age: 29
End: 2024-01-02

## 2024-01-02 ENCOUNTER — RESULT REVIEW (OUTPATIENT)
Age: 29
End: 2024-01-02

## 2024-01-02 LAB
ALBUMIN SERPL ELPH-MCNC: 3.2 G/DL — LOW (ref 3.5–5.2)
ALBUMIN SERPL ELPH-MCNC: 3.2 G/DL — LOW (ref 3.5–5.2)
ALP SERPL-CCNC: 110 U/L — SIGNIFICANT CHANGE UP (ref 30–115)
ALP SERPL-CCNC: 110 U/L — SIGNIFICANT CHANGE UP (ref 30–115)
ALT FLD-CCNC: 13 U/L — SIGNIFICANT CHANGE UP (ref 0–41)
ALT FLD-CCNC: 13 U/L — SIGNIFICANT CHANGE UP (ref 0–41)
ANION GAP SERPL CALC-SCNC: 12 MMOL/L — SIGNIFICANT CHANGE UP (ref 7–14)
ANION GAP SERPL CALC-SCNC: 12 MMOL/L — SIGNIFICANT CHANGE UP (ref 7–14)
AST SERPL-CCNC: 15 U/L — SIGNIFICANT CHANGE UP (ref 0–41)
AST SERPL-CCNC: 15 U/L — SIGNIFICANT CHANGE UP (ref 0–41)
BASOPHILS # BLD AUTO: 0.06 K/UL — SIGNIFICANT CHANGE UP (ref 0–0.2)
BASOPHILS # BLD AUTO: 0.06 K/UL — SIGNIFICANT CHANGE UP (ref 0–0.2)
BASOPHILS NFR BLD AUTO: 0.4 % — SIGNIFICANT CHANGE UP (ref 0–1)
BASOPHILS NFR BLD AUTO: 0.4 % — SIGNIFICANT CHANGE UP (ref 0–1)
BILIRUB SERPL-MCNC: <0.2 MG/DL — SIGNIFICANT CHANGE UP (ref 0.2–1.2)
BILIRUB SERPL-MCNC: <0.2 MG/DL — SIGNIFICANT CHANGE UP (ref 0.2–1.2)
BUN SERPL-MCNC: 8 MG/DL — LOW (ref 10–20)
BUN SERPL-MCNC: 8 MG/DL — LOW (ref 10–20)
CALCIUM SERPL-MCNC: 8.6 MG/DL — SIGNIFICANT CHANGE UP (ref 8.4–10.4)
CALCIUM SERPL-MCNC: 8.6 MG/DL — SIGNIFICANT CHANGE UP (ref 8.4–10.4)
CHLORIDE SERPL-SCNC: 104 MMOL/L — SIGNIFICANT CHANGE UP (ref 98–110)
CHLORIDE SERPL-SCNC: 104 MMOL/L — SIGNIFICANT CHANGE UP (ref 98–110)
CO2 SERPL-SCNC: 22 MMOL/L — SIGNIFICANT CHANGE UP (ref 17–32)
CO2 SERPL-SCNC: 22 MMOL/L — SIGNIFICANT CHANGE UP (ref 17–32)
CREAT SERPL-MCNC: 0.5 MG/DL — LOW (ref 0.7–1.5)
CREAT SERPL-MCNC: 0.5 MG/DL — LOW (ref 0.7–1.5)
EGFR: 131 ML/MIN/1.73M2 — SIGNIFICANT CHANGE UP
EGFR: 131 ML/MIN/1.73M2 — SIGNIFICANT CHANGE UP
EOSINOPHIL # BLD AUTO: 0.28 K/UL — SIGNIFICANT CHANGE UP (ref 0–0.7)
EOSINOPHIL # BLD AUTO: 0.28 K/UL — SIGNIFICANT CHANGE UP (ref 0–0.7)
EOSINOPHIL NFR BLD AUTO: 1.9 % — SIGNIFICANT CHANGE UP (ref 0–8)
EOSINOPHIL NFR BLD AUTO: 1.9 % — SIGNIFICANT CHANGE UP (ref 0–8)
GLUCOSE BLDC GLUCOMTR-MCNC: 101 MG/DL — HIGH (ref 70–99)
GLUCOSE BLDC GLUCOMTR-MCNC: 101 MG/DL — HIGH (ref 70–99)
GLUCOSE BLDC GLUCOMTR-MCNC: 111 MG/DL — HIGH (ref 70–99)
GLUCOSE BLDC GLUCOMTR-MCNC: 111 MG/DL — HIGH (ref 70–99)
GLUCOSE SERPL-MCNC: 98 MG/DL — SIGNIFICANT CHANGE UP (ref 70–99)
GLUCOSE SERPL-MCNC: 98 MG/DL — SIGNIFICANT CHANGE UP (ref 70–99)
HCT VFR BLD CALC: 28.7 % — LOW (ref 37–47)
HCT VFR BLD CALC: 28.7 % — LOW (ref 37–47)
HGB BLD-MCNC: 9.2 G/DL — LOW (ref 12–16)
HGB BLD-MCNC: 9.2 G/DL — LOW (ref 12–16)
IMM GRANULOCYTES NFR BLD AUTO: 4 % — HIGH (ref 0.1–0.3)
IMM GRANULOCYTES NFR BLD AUTO: 4 % — HIGH (ref 0.1–0.3)
LYMPHOCYTES # BLD AUTO: 18.2 % — LOW (ref 20.5–51.1)
LYMPHOCYTES # BLD AUTO: 18.2 % — LOW (ref 20.5–51.1)
LYMPHOCYTES # BLD AUTO: 2.65 K/UL — SIGNIFICANT CHANGE UP (ref 1.2–3.4)
LYMPHOCYTES # BLD AUTO: 2.65 K/UL — SIGNIFICANT CHANGE UP (ref 1.2–3.4)
MCHC RBC-ENTMCNC: 27.4 PG — SIGNIFICANT CHANGE UP (ref 27–31)
MCHC RBC-ENTMCNC: 27.4 PG — SIGNIFICANT CHANGE UP (ref 27–31)
MCHC RBC-ENTMCNC: 32.1 G/DL — SIGNIFICANT CHANGE UP (ref 32–37)
MCHC RBC-ENTMCNC: 32.1 G/DL — SIGNIFICANT CHANGE UP (ref 32–37)
MCV RBC AUTO: 85.4 FL — SIGNIFICANT CHANGE UP (ref 81–99)
MCV RBC AUTO: 85.4 FL — SIGNIFICANT CHANGE UP (ref 81–99)
MONOCYTES # BLD AUTO: 0.94 K/UL — HIGH (ref 0.1–0.6)
MONOCYTES # BLD AUTO: 0.94 K/UL — HIGH (ref 0.1–0.6)
MONOCYTES NFR BLD AUTO: 6.4 % — SIGNIFICANT CHANGE UP (ref 1.7–9.3)
MONOCYTES NFR BLD AUTO: 6.4 % — SIGNIFICANT CHANGE UP (ref 1.7–9.3)
NEUTROPHILS # BLD AUTO: 10.06 K/UL — HIGH (ref 1.4–6.5)
NEUTROPHILS # BLD AUTO: 10.06 K/UL — HIGH (ref 1.4–6.5)
NEUTROPHILS NFR BLD AUTO: 69.1 % — SIGNIFICANT CHANGE UP (ref 42.2–75.2)
NEUTROPHILS NFR BLD AUTO: 69.1 % — SIGNIFICANT CHANGE UP (ref 42.2–75.2)
NRBC # BLD: 0 /100 WBCS — SIGNIFICANT CHANGE UP (ref 0–0)
NRBC # BLD: 0 /100 WBCS — SIGNIFICANT CHANGE UP (ref 0–0)
PLATELET # BLD AUTO: 226 K/UL — SIGNIFICANT CHANGE UP (ref 130–400)
PLATELET # BLD AUTO: 226 K/UL — SIGNIFICANT CHANGE UP (ref 130–400)
PMV BLD: 10.4 FL — SIGNIFICANT CHANGE UP (ref 7.4–10.4)
PMV BLD: 10.4 FL — SIGNIFICANT CHANGE UP (ref 7.4–10.4)
POTASSIUM SERPL-MCNC: 4.3 MMOL/L — SIGNIFICANT CHANGE UP (ref 3.5–5)
POTASSIUM SERPL-MCNC: 4.3 MMOL/L — SIGNIFICANT CHANGE UP (ref 3.5–5)
POTASSIUM SERPL-SCNC: 4.3 MMOL/L — SIGNIFICANT CHANGE UP (ref 3.5–5)
POTASSIUM SERPL-SCNC: 4.3 MMOL/L — SIGNIFICANT CHANGE UP (ref 3.5–5)
PROT SERPL-MCNC: 5.7 G/DL — LOW (ref 6–8)
PROT SERPL-MCNC: 5.7 G/DL — LOW (ref 6–8)
RBC # BLD: 3.36 M/UL — LOW (ref 4.2–5.4)
RBC # BLD: 3.36 M/UL — LOW (ref 4.2–5.4)
RBC # FLD: 13.8 % — SIGNIFICANT CHANGE UP (ref 11.5–14.5)
RBC # FLD: 13.8 % — SIGNIFICANT CHANGE UP (ref 11.5–14.5)
SODIUM SERPL-SCNC: 138 MMOL/L — SIGNIFICANT CHANGE UP (ref 135–146)
SODIUM SERPL-SCNC: 138 MMOL/L — SIGNIFICANT CHANGE UP (ref 135–146)
WBC # BLD: 14.58 K/UL — HIGH (ref 4.8–10.8)
WBC # BLD: 14.58 K/UL — HIGH (ref 4.8–10.8)
WBC # FLD AUTO: 14.58 K/UL — HIGH (ref 4.8–10.8)
WBC # FLD AUTO: 14.58 K/UL — HIGH (ref 4.8–10.8)

## 2024-01-02 PROCEDURE — 88307 TISSUE EXAM BY PATHOLOGIST: CPT | Mod: 26

## 2024-01-02 PROCEDURE — 76818 FETAL BIOPHYS PROFILE W/NST: CPT | Mod: 26

## 2024-01-02 PROCEDURE — 99418 PROLNG IP/OBS E/M EA 15 MIN: CPT | Mod: 25

## 2024-01-02 PROCEDURE — 76821 MIDDLE CEREBRAL ARTERY ECHO: CPT | Mod: 26,59

## 2024-01-02 PROCEDURE — 99233 SBSQ HOSP IP/OBS HIGH 50: CPT | Mod: 25

## 2024-01-02 PROCEDURE — 76820 UMBILICAL ARTERY ECHO: CPT | Mod: 26

## 2024-01-02 PROCEDURE — 59510 CESAREAN DELIVERY: CPT | Mod: U7,22

## 2024-01-02 RX ORDER — NALOXONE HYDROCHLORIDE 4 MG/.1ML
0.1 SPRAY NASAL
Refills: 0 | Status: DISCONTINUED | OUTPATIENT
Start: 2024-01-02 | End: 2024-01-02

## 2024-01-02 RX ORDER — GENTAMICIN SULFATE 40 MG/ML
80 VIAL (ML) INJECTION EVERY 8 HOURS
Refills: 0 | Status: COMPLETED | OUTPATIENT
Start: 2024-01-02 | End: 2024-01-03

## 2024-01-02 RX ORDER — DIPHENHYDRAMINE HCL 50 MG
25 CAPSULE ORAL EVERY 6 HOURS
Refills: 0 | Status: DISCONTINUED | OUTPATIENT
Start: 2024-01-02 | End: 2024-01-06

## 2024-01-02 RX ORDER — TETANUS TOXOID, REDUCED DIPHTHERIA TOXOID AND ACELLULAR PERTUSSIS VACCINE, ADSORBED 5; 2.5; 8; 8; 2.5 [IU]/.5ML; [IU]/.5ML; UG/.5ML; UG/.5ML; UG/.5ML
0.5 SUSPENSION INTRAMUSCULAR ONCE
Refills: 0 | Status: DISCONTINUED | OUTPATIENT
Start: 2024-01-02 | End: 2024-01-06

## 2024-01-02 RX ORDER — OXYCODONE HYDROCHLORIDE 5 MG/1
5 TABLET ORAL
Refills: 0 | Status: DISCONTINUED | OUTPATIENT
Start: 2024-01-02 | End: 2024-01-06

## 2024-01-02 RX ORDER — AMPICILLIN TRIHYDRATE 250 MG
2000 CAPSULE ORAL ONCE
Refills: 0 | Status: COMPLETED | OUTPATIENT
Start: 2024-01-02 | End: 2024-01-02

## 2024-01-02 RX ORDER — AMPICILLIN TRIHYDRATE 250 MG
2 CAPSULE ORAL EVERY 6 HOURS
Refills: 0 | Status: COMPLETED | OUTPATIENT
Start: 2024-01-02 | End: 2024-01-03

## 2024-01-02 RX ORDER — ONDANSETRON 8 MG/1
4 TABLET, FILM COATED ORAL EVERY 6 HOURS
Refills: 0 | Status: DISCONTINUED | OUTPATIENT
Start: 2024-01-02 | End: 2024-01-02

## 2024-01-02 RX ORDER — OXYTOCIN 10 UNIT/ML
333.33 VIAL (ML) INJECTION
Qty: 20 | Refills: 0 | Status: DISCONTINUED | OUTPATIENT
Start: 2024-01-02 | End: 2024-01-06

## 2024-01-02 RX ORDER — FOLIC ACID 0.8 MG
1 TABLET ORAL DAILY
Refills: 0 | Status: DISCONTINUED | OUTPATIENT
Start: 2024-01-02 | End: 2024-01-06

## 2024-01-02 RX ORDER — ENOXAPARIN SODIUM 100 MG/ML
40 INJECTION SUBCUTANEOUS EVERY 24 HOURS
Refills: 0 | Status: DISCONTINUED | OUTPATIENT
Start: 2024-01-03 | End: 2024-01-06

## 2024-01-02 RX ORDER — LANOLIN
1 OINTMENT (GRAM) TOPICAL EVERY 6 HOURS
Refills: 0 | Status: DISCONTINUED | OUTPATIENT
Start: 2024-01-02 | End: 2024-01-06

## 2024-01-02 RX ORDER — OXYCODONE HYDROCHLORIDE 5 MG/1
5 TABLET ORAL ONCE
Refills: 0 | Status: DISCONTINUED | OUTPATIENT
Start: 2024-01-02 | End: 2024-01-06

## 2024-01-02 RX ORDER — IBUPROFEN 200 MG
600 TABLET ORAL EVERY 6 HOURS
Refills: 0 | Status: COMPLETED | OUTPATIENT
Start: 2024-01-02 | End: 2024-11-30

## 2024-01-02 RX ORDER — KETOROLAC TROMETHAMINE 30 MG/ML
30 SYRINGE (ML) INJECTION EVERY 6 HOURS
Refills: 0 | Status: DISCONTINUED | OUTPATIENT
Start: 2024-01-02 | End: 2024-01-03

## 2024-01-02 RX ORDER — GENTAMICIN SULFATE 40 MG/ML
80 VIAL (ML) INJECTION ONCE
Refills: 0 | Status: COMPLETED | OUTPATIENT
Start: 2024-01-02 | End: 2024-01-02

## 2024-01-02 RX ORDER — MAGNESIUM SULFATE 500 MG/ML
1 VIAL (ML) INJECTION
Qty: 40 | Refills: 0 | Status: DISCONTINUED | OUTPATIENT
Start: 2024-01-02 | End: 2024-01-02

## 2024-01-02 RX ORDER — ACETAMINOPHEN 500 MG
975 TABLET ORAL
Refills: 0 | Status: DISCONTINUED | OUTPATIENT
Start: 2024-01-02 | End: 2024-01-06

## 2024-01-02 RX ORDER — SIMETHICONE 80 MG/1
80 TABLET, CHEWABLE ORAL EVERY 4 HOURS
Refills: 0 | Status: DISCONTINUED | OUTPATIENT
Start: 2024-01-02 | End: 2024-01-06

## 2024-01-02 RX ORDER — DEXAMETHASONE 0.5 MG/5ML
4 ELIXIR ORAL EVERY 6 HOURS
Refills: 0 | Status: DISCONTINUED | OUTPATIENT
Start: 2024-01-02 | End: 2024-01-02

## 2024-01-02 RX ORDER — MORPHINE SULFATE 50 MG/1
0.15 CAPSULE, EXTENDED RELEASE ORAL ONCE
Refills: 0 | Status: DISCONTINUED | OUTPATIENT
Start: 2024-01-02 | End: 2024-01-02

## 2024-01-02 RX ORDER — AZITHROMYCIN 500 MG/1
500 TABLET, FILM COATED ORAL ONCE
Refills: 0 | Status: DISCONTINUED | OUTPATIENT
Start: 2024-01-02 | End: 2024-01-02

## 2024-01-02 RX ORDER — SODIUM CHLORIDE 9 MG/ML
1000 INJECTION, SOLUTION INTRAVENOUS
Refills: 0 | Status: DISCONTINUED | OUTPATIENT
Start: 2024-01-02 | End: 2024-01-02

## 2024-01-02 RX ORDER — LOPERAMIDE HCL 2 MG
2 TABLET ORAL ONCE
Refills: 0 | Status: DISCONTINUED | OUTPATIENT
Start: 2024-01-02 | End: 2024-01-02

## 2024-01-02 RX ORDER — ASCORBIC ACID 60 MG
500 TABLET,CHEWABLE ORAL DAILY
Refills: 0 | Status: DISCONTINUED | OUTPATIENT
Start: 2024-01-02 | End: 2024-01-06

## 2024-01-02 RX ORDER — CEFAZOLIN SODIUM 1 G
2000 VIAL (EA) INJECTION ONCE
Refills: 0 | Status: DISCONTINUED | OUTPATIENT
Start: 2024-01-02 | End: 2024-01-02

## 2024-01-02 RX ORDER — SODIUM CHLORIDE 9 MG/ML
1000 INJECTION, SOLUTION INTRAVENOUS
Refills: 0 | Status: DISCONTINUED | OUTPATIENT
Start: 2024-01-02 | End: 2024-01-06

## 2024-01-02 RX ORDER — ZINC SULFATE TAB 220 MG (50 MG ZINC EQUIVALENT) 220 (50 ZN) MG
220 TAB ORAL DAILY
Refills: 0 | Status: DISCONTINUED | OUTPATIENT
Start: 2024-01-02 | End: 2024-01-06

## 2024-01-02 RX ORDER — MAGNESIUM HYDROXIDE 400 MG/1
30 TABLET, CHEWABLE ORAL
Refills: 0 | Status: DISCONTINUED | OUTPATIENT
Start: 2024-01-02 | End: 2024-01-06

## 2024-01-02 RX ORDER — FERROUS SULFATE 325(65) MG
325 TABLET ORAL DAILY
Refills: 0 | Status: DISCONTINUED | OUTPATIENT
Start: 2024-01-02 | End: 2024-01-06

## 2024-01-02 RX ORDER — CARBOPROST TROMETHAMINE 250 UG/ML
250 INJECTION, SOLUTION INTRAMUSCULAR ONCE
Refills: 0 | Status: DISCONTINUED | OUTPATIENT
Start: 2024-01-02 | End: 2024-01-02

## 2024-01-02 RX ORDER — MAGNESIUM SULFATE 500 MG/ML
4 VIAL (ML) INJECTION ONCE
Refills: 0 | Status: COMPLETED | OUTPATIENT
Start: 2024-01-02 | End: 2024-01-02

## 2024-01-02 RX ADMIN — Medication 975 MILLIGRAM(S): at 21:27

## 2024-01-02 RX ADMIN — ENOXAPARIN SODIUM 40 MILLIGRAM(S): 100 INJECTION SUBCUTANEOUS at 23:58

## 2024-01-02 RX ADMIN — Medication 30 MILLIGRAM(S): at 18:30

## 2024-01-02 RX ADMIN — Medication 25 GM/HR: at 09:48

## 2024-01-02 RX ADMIN — Medication 100 MILLIGRAM(S): at 17:46

## 2024-01-02 RX ADMIN — Medication 200 GRAM(S): at 17:14

## 2024-01-02 RX ADMIN — Medication 100 MILLIGRAM(S): at 10:35

## 2024-01-02 RX ADMIN — Medication 600 MILLIGRAM(S): at 05:17

## 2024-01-02 RX ADMIN — Medication 30 MILLIGRAM(S): at 23:57

## 2024-01-02 RX ADMIN — SODIUM CHLORIDE 75 MILLILITER(S): 9 INJECTION, SOLUTION INTRAVENOUS at 09:48

## 2024-01-02 RX ADMIN — Medication 300 GRAM(S): at 01:49

## 2024-01-02 RX ADMIN — Medication 975 MILLIGRAM(S): at 20:49

## 2024-01-02 RX ADMIN — Medication 25 GM/HR: at 02:17

## 2024-01-02 RX ADMIN — Medication 500 MILLIGRAM(S): at 05:17

## 2024-01-02 RX ADMIN — Medication 204 MILLIGRAM(S): at 18:17

## 2024-01-02 RX ADMIN — Medication 204 MILLIGRAM(S): at 10:48

## 2024-01-02 RX ADMIN — Medication 200 MILLIGRAM(S): at 10:32

## 2024-01-02 RX ADMIN — Medication 1000 MILLIUNIT(S)/MIN: at 13:05

## 2024-01-02 RX ADMIN — Medication 200 GRAM(S): at 21:39

## 2024-01-02 RX ADMIN — Medication 30 MILLIGRAM(S): at 18:01

## 2024-01-02 NOTE — PROGRESS NOTE ADULT - ATTENDING COMMENTS
[facility] Staff - Please see my documentation for modifications    I saw and evaluated Ms. NABOR LION  with Dr. Blancas. Ms. NABOR LION reports positive fetal movement and no vaginal bleeding.    General:  Ms. NABOR LION is lying in bed.  She appears less comfortable today compared to yesterday.    Vital Signs Last 24 Hrs  T(C): 36.6 (2024 10:26), Max: 37.1 (2024 19:06)  T(F): 97.88 (2024 10:26), Max: 98.78 (2024 19:06)  HR: 83 (2024 10:38) (47 - 101)  BP: 105/53 (2024 10:22) (89/41 - 118/59)  BP(mean): --  RR: 16 (2024 10:22) (16 - 16)  SpO2: 100% (2024 10:38) (88% - 100%)    Cardiovascular:  Normal S1 S2.  No murmurs.  Pulmonary:  Clear to auscultation bilaterally.  No wheezing.  Abdomen:  Soft,  nondistended, no rebound, no guarding.  She feels uncomfortable when palpating the abdomen.  Extremities:  Nontender calves.  Neurology:  Deep tendon reflexes 2+ bilaterally.                          9.2    14.58 )-----------( 226      ( 2024 04:50 )             28.7                         9.3    12.40 )-----------( 222      ( 2024 15:29 )             27.9                         8.8    12.70 )-----------( 201      ( 31 Dec 2023 04:36 )             27.0                         9.0    11.63 )-----------( 238      ( 30 Dec 2023 22:55 )             27.1     -    138  |  104  |  8   ----------------------------<  98  4.3   |  22  |  0.5  01-    139  |  105  |  8   ----------------------------<  81  3.8   |  23  |  0.5    Ca    8.6      2024 04:50   Mg    x   2024 04:50  Phos    x    2024 04:50  Ca    8.6      2024 15:29   Mg    x   2024 15:29  Phos    x    2024 15:29    TPro  5.7  /  Alb  3.2  /  TBili  <0.2  /  DBili  x   /  AST  15  /  ALT  13  /  AlkPhos  110  24      Lactate dehydrogenase  x     24  Uric acid    x    24    Ms. Lion is a 29yo  at 27w5d, GBS neg, mono-di twins with PPROM .  She has a Hx TTTS at 16w, s/p laser surgery at [place].  She states that her abdomen feels different and more uncomfortable and that she has been feeling frequent abdominal cramping.  She is afebrile, her regina signs are stable, she does not have tachycardia, and the fetal baselines are not in the range of tachycardia.  She does not meet criteria for intraamniotic infection.  However her abdomen feels uncomfortable, she has more abdominal cramping and her WBC is rising.   testing is overall reassuring although there are occasional variable decelerations on fetal monitoring.  I think that she may be starting to develop an intraamniotic infection although I can not say with 100% certainty.  She has already received  corticosteroids and is receiving magnesium sulfate for neuroprotection.  The NICU team is available.  I do not think we will be gaining significantly if we delay delivery and I recommend delivery today.  Ms. Lion desired to speak with the Neonatologist again and she spoke with him.  She agrees to proceed with delivery.  She understands that the babies will be in the NICU for some time, and that we cannot guarantee outcome.  She also understands that on pathology signs of infection may not be present, but this not negate the clinical assessment.    1. PPROM,  @2100; unclear which sac.  - S/p Ampicillin and Azithromycin 1g x1, currently on amoxicillin  - S/p betamethasone x2 doses over 24hr for fetal lung maturity (-)  - NICU consult done, Ayad Tsang spoke with the NICU team again today.      2.  Monochorionic-Diamniotic Twins  - History of twin twin transfusion syndrome  - S/p laser ablation at [facility] at around 16 weeks gestation. variable lie at 27w2d with  premature rupture of membranes.    -  PPROM is a described complication of laser ablation therapy.  - We discussed that measuring the amniotic fluid in each sac can be difficult based upon fetal position and the location of the intervening membrane.    3. Normocytic anemia:   - Low ferritin  - Received IV iron infusion 2023      I discussed this with Ms. Lion and Dr. Dobbins.  She expressed understanding and all of her questions were answered to her satisfaction.  She is crossed for Packed Red Blood Cells and is on call to the OR.    Luisito Sales MD    Two hours were spent on this encounter. [facility] Staff - Please see my documentation for modifications    I saw and evaluated Ms. NABOR LION  with Dr. Blancas. Ms. NABRO LION reports positive fetal movement and no vaginal bleeding.    General:  Ms. NABOR LION is lying in bed.  She appears less comfortable today compared to yesterday.    Vital Signs Last 24 Hrs  T(C): 36.6 (2024 10:26), Max: 37.1 (2024 19:06)  T(F): 97.88 (2024 10:26), Max: 98.78 (2024 19:06)  HR: 83 (2024 10:38) (47 - 101)  BP: 105/53 (2024 10:22) (89/41 - 118/59)  BP(mean): --  RR: 16 (2024 10:22) (16 - 16)  SpO2: 100% (2024 10:38) (88% - 100%)    Cardiovascular:  Normal S1 S2.  No murmurs.  Pulmonary:  Clear to auscultation bilaterally.  No wheezing.  Abdomen:  Soft,  nondistended, no rebound, no guarding.  She feels uncomfortable when palpating the abdomen.  Extremities:  Nontender calves.  Neurology:  Deep tendon reflexes 2+ bilaterally.                          9.2    14.58 )-----------( 226      ( 2024 04:50 )             28.7                         9.3    12.40 )-----------( 222      ( 2024 15:29 )             27.9                         8.8    12.70 )-----------( 201      ( 31 Dec 2023 04:36 )             27.0                         9.0    11.63 )-----------( 238      ( 30 Dec 2023 22:55 )             27.1     -    138  |  104  |  8   ----------------------------<  98  4.3   |  22  |  0.5  01-    139  |  105  |  8   ----------------------------<  81  3.8   |  23  |  0.5    Ca    8.6      2024 04:50   Mg    x   2024 04:50  Phos    x    2024 04:50  Ca    8.6      2024 15:29   Mg    x   2024 15:29  Phos    x    2024 15:29    TPro  5.7  /  Alb  3.2  /  TBili  <0.2  /  DBili  x   /  AST  15  /  ALT  13  /  AlkPhos  110  24      Lactate dehydrogenase  x     24  Uric acid    x    24    Ms. Lion is a 27yo  at 27w5d, GBS neg, mono-di twins with PPROM .  She has a Hx TTTS at 16w, s/p laser surgery at [place].  She states that her abdomen feels different and more uncomfortable and that she has been feeling frequent abdominal cramping.  She is afebrile, her regina signs are stable, she does not have tachycardia, and the fetal baselines are not in the range of tachycardia.  She does not meet criteria for intraamniotic infection.  However her abdomen feels uncomfortable, she has more abdominal cramping and her WBC is rising.   testing is overall reassuring although there are occasional variable decelerations on fetal monitoring.  I think that she may be starting to develop an intraamniotic infection although I can not say with 100% certainty.  She has already received  corticosteroids and is receiving magnesium sulfate for neuroprotection.  The NICU team is available.  I do not think we will be gaining significantly if we delay delivery and I recommend delivery today.  Ms. Lion desired to speak with the Neonatologist again and she spoke with him.  She agrees to proceed with delivery.  She understands that the babies will be in the NICU for some time, and that we cannot guarantee outcome.  She also understands that on pathology signs of infection may not be present, but this not negate the clinical assessment.    1. PPROM,  @2100; unclear which sac.  - S/p Ampicillin and Azithromycin 1g x1, currently on amoxicillin  - S/p betamethasone x2 doses over 24hr for fetal lung maturity (-)  - NICU consult done, Ayad Tsang spoke with the NICU team again today.      2.  Monochorionic-Diamniotic Twins  - History of twin twin transfusion syndrome  - S/p laser ablation at [facility] at around 16 weeks gestation. variable lie at 27w2d with  premature rupture of membranes.    -  PPROM is a described complication of laser ablation therapy.  - We discussed that measuring the amniotic fluid in each sac can be difficult based upon fetal position and the location of the intervening membrane.    3. Normocytic anemia:   - Low ferritin  - Received IV iron infusion 2023      I discussed this with Ms. Lion and Dr. Dobbins.  She expressed understanding and all of her questions were answered to her satisfaction.  She is crossed for Packed Red Blood Cells and is on call to the OR.    Luisito Sales MD    Two hours were spent on this encounter.

## 2024-01-02 NOTE — OB RN INTRAOPERATIVE NOTE - NS_ADDITIONALPERSONNEL_OBGYN_ALL_OB_FT
[facility] History & Physical Examination   Patient Name: Irvin Diaz Jr.  MRN: [id number]  Patient Class: IP- Inpatient   Admission Date: 4/28/2023  5:39 AM  Length of Stay: 0  Attending Physician: Rinku Moe MD  Primary Care Provider: Edouard Gibson MD  Face-to-Face encounter date: 04/28/2023  Code Status: Full Code  MPOA:  Chief Complaint: Abdominal Pain and Vomiting (Dark red, coffee ground, since 1am)        HISTORY OF PRESENT ILLNESS:     Irvin Diaz Jr. is a 48 y.o. with hx of cirrhosis and varices, GERD, GIB, HTN, ETOH use d/o who came in with hematemesis and abd discomfort.  He states he was abstaining from alcohol for over a year, until about 2 weeks ago when he began drinking again.  He has experienced withdrawal symptoms before, denies hx of seizure from etoh wd.  He began vomiting coffee-ground emesis initially, then bright red blood, estimates that it was more than 3-4 cups of blood.  Will admit for urgent endoscopy. History was obtained from the patient and ER physician Sign-out.   Patient denies change in vision, hearing, headache, fever, cough, congestion, runny nose, chest pain, shortness of breath, palpitations, diarrhea, constipation, vomiting, dysuria, hematuria, joint pain and back pain.     REVIEW OF SYSTEMS:   10 Point Review of System was performed and was found to be negative except for that mentioned already in the HPI above.     PAST MEDICAL HISTORY:     Past Medical History:   Diagnosis Date    Alcohol withdrawal 5/1/2022    Alcoholic cirrhosis of liver     Alcoholic ketoacidosis 6/6/2021    Arthritis     ATN (acute tubular necrosis)     CHF (congestive heart failure)     Diverticulitis 01/2020    Esophageal varices     GERD (gastroesophageal reflux disease)     GI bleed     Hip arthritis     Left    Hypertension     Liver cirrhosis     Macrocytic anemia     Pulmonary embolism 08/2018    Unprovoked DVT.  Stop Coumadin due to GIB     Thrombocytopenia        PAST SURGICAL HISTORY:     Past Surgical History:   Procedure Laterality Date    ANKLE SURGERY      BLOCK, NERVE, PERIPHERAL Left 06/24/2022    Procedure: Left Hip femoral-obturator accessory nerve block;  Surgeon: Robbin De La Garza DO;  Location: [facility] OR;  Service: Pain Management;  Laterality: Left;    COLON SURGERY  2007    COLONOSCOPY  09/05/2019    [facility]    COLONOSCOPY N/A 02/17/2021    Procedure: COLONOSCOPY;  Surgeon: Renea Billingsley MD;  Location: [facility];  Service: Endoscopy;  Laterality: N/A;    COLONOSCOPY N/A 2/13/2023    Procedure: COLONOSCOPY;  Surgeon: Rudy Orellana MD;  [facility]  Service: Endoscopy;  Laterality: N/A;  cirrhosis-labs done on 1/11/23  instr portal-GT  No answer for precall- [place]    COLONOSCOPY N/A 3/10/2023    Procedure: COLONOSCOPY;  Surgeon: Rudy Orellana MD;  Location: [facility] ([address] [place] [address]);  Service: Endoscopy;  Laterality: N/A;  inst via poral per pt request    COLONOSCOPY W/ BIOPSIES  02/17/2021    ESOPHAGOGASTRODUODENOSCOPY N/A 07/26/2019    Procedure: EGD (ESOPHAGOGASTRODUODENOSCOPY);  Surgeon: Brian Trivedi MD;  Location: [facility];  Service: Endoscopy;  Laterality: N/A;    ESOPHAGOGASTRODUODENOSCOPY N/A 04/08/2020    Procedure: EGD (ESOPHAGOGASTRODUODENOSCOPY);  Surgeon: Donn Acuna MD;  Location: [facility];  Service: Endoscopy;  Laterality: N/A;    ESOPHAGOGASTRODUODENOSCOPY N/A 01/03/2021    Procedure: EGD (ESOPHAGOGASTRODUODENOSCOPY)- coffee ground emesis, hx varices;  Surgeon: Steve Chairez MD;  Location: [facility];  Service: Endoscopy;  Laterality: N/A;    ESOPHAGOGASTRODUODENOSCOPY N/A 05/03/2021    Procedure: EGD (ESOPHAGOGASTRODUODENOSCOPY);  Surgeon: Jeanmarie Ngo MD;  Location: [facility];  Service: Endoscopy;  Laterality: N/A;    ESOPHAGOGASTRODUODENOSCOPY N/A 07/19/2021    Procedure: ESOPHAGOGASTRODUODENOSCOPY (EGD);  Surgeon: Claudio Medeiros MD;  Location: [facility];  Service: Endoscopy;   Laterality: N/A;    ESOPHAGOGASTRODUODENOSCOPY  2021    ESOPHAGOGASTRODUODENOSCOPY N/A 2021    Procedure: EGD (ESOPHAGOGASTRODUODENOSCOPY);  Surgeon: Ajay Jackson MD;  Location: [facility];  Service: Endoscopy;  Laterality: N/A;    ESOPHAGOGASTRODUODENOSCOPY N/A 2022    Procedure: EGD (ESOPHAGOGASTRODUODENOSCOPY);  Surgeon: Brian Trivedi MD;  Location: [facility];  Service: Endoscopy;  Laterality: N/A;    ESOPHAGOGASTRODUODENOSCOPY N/A 2022    Procedure: EGD (ESOPHAGOGASTRODUODENOSCOPY);  Surgeon: Ajay Jackson MD;  Location: [facility];  Service: Endoscopy;  Laterality: N/A;    ESOPHAGOGASTRODUODENOSCOPY N/A 2022    Procedure: EGD (ESOPHAGOGASTRODUODENOSCOPY);  Surgeon: Edouard Maynard MD;  Location: [address]);  Service: Endoscopy;  Laterality: N/A;    HERNIA REPAIR Left     Inguinal    UPPER GASTROINTESTINAL ENDOSCOPY N/A 2022       ALLERGIES:   Ciprofloxacin hcl, Meperidine, Morphine, Nsaids (non-steroidal anti-inflammatory drug), and Tylenol [acetaminophen]    FAMILY HISTORY:     Family History   Problem Relation Age of Onset    Hypertension Mother     Breast cancer Mother     Hypertension Father     Prostate cancer Father     Bladder Cancer Father        SOCIAL HISTORY:     Social History     Tobacco Use    Smoking status: Former     Types: Cigarettes     Quit date: 2000     Years since quittin.3    Smokeless tobacco: Never    Tobacco comments:     quit 20 years ago   Substance Use Topics    Alcohol use: Not Currently     Comment: Quit drinking 22        Social History     Substance and Sexual Activity   Sexual Activity Yes    Comment: occ        HOME MEDICATIONS:     Prior to Admission medications    Medication Sig Start Date End Date Taking? Authorizing Provider   acamprosate (CAMPRAL) 333 mg tablet Take 2 tablets (666 mg total) by mouth 3 (three) times daily. 22 Yes Arthur Otto MD   amLODIPine (NORVASC) 10 MG tablet Take 1  tablet (10 mg total) by mouth once daily. 12/22/22 12/22/23 Yes Ambreen Hou MD   augmented betamethasone dipropionate (DIPROLENE-AF) 0.05 % cream Apply topically 2 (two) times daily.  Patient taking differently: Apply 1 application topically 2 (two) times daily. 1/23/23  Yes Hossein De La O MD   calcitRIOL (ROCALTROL) 0.25 MCG Cap Take 1 capsule (0.25 mcg total) by mouth once daily. 12/22/22  Yes Ambreen Hou MD   carvediloL (COREG) 12.5 MG tablet Take 1 tablet (12.5 mg total) by mouth 2 (two) times daily with meals. 8/18/22 8/18/23 Yes Ambreen Hou MD   clindamycin-benzoyl peroxide (BENZACLIN) gel Apply topically 2 (two) times daily.  Patient taking differently: Apply 1 application topically 2 (two) times daily. 1/23/23 1/23/24 Yes Edouard Gibson MD   ferrous gluconate (FERGON) 240 (27 FE) MG tablet Take 2 tablets (480 mg total) by mouth 2 (two) times daily with meals. 10/30/22  Yes Floridalma Veronica MD   folic acid (FOLVITE) 1 MG tablet Take 1 tablet (1 mg total) by mouth once daily. 12/14/22  Yes Edouard Gibson MD   furosemide (LASIX) 20 MG tablet Take 1 tablet (20 mg total) by mouth once daily. 12/1/22 12/1/23 Yes Keesha Martin MD   nortriptyline (PAMELOR) 50 MG capsule Take 1 capsule (50 mg total) by mouth nightly as needed (pain and insomnia). 1/25/23 7/24/23 Yes Robbin De La Garza DO   pantoprazole (PROTONIX) 40 MG tablet Take 1 tablet (40 mg total) by mouth 2 (two) times daily. 11/4/22 10/30/23 Yes Chanel Hernández MD   rifAXIMin (XIFAXAN) 550 mg Tab Take 1 tablet (550 mg total) by mouth 2 (two) times daily. 9/30/22  Yes Floridalma Veronica MD   sildenafiL (VIAGRA) 100 MG tablet Take 1 tablet (100 mg total) by mouth daily as needed for Erectile Dysfunction. 10/28/22 10/28/23 Yes Edouard Gibson MD   spironolactone (ALDACTONE) 50 MG tablet Take 1 tablet (50 mg total) by mouth once daily. 12/1/22 12/1/23 Yes Keesha Martin MD   thiamine 100 MG tablet Take 1 tablet (100 mg total) by mouth once  daily. 6/30/22  Yes Edouard Gibson MD   traMADoL (ULTRAM) 50 mg tablet Take 1 tablet (50 mg total) by mouth 3 (three) times daily as needed for Pain. 3/19/23 5/3/23 Yes Robbin De La Garza,    lactulose (CHRONULAC) 10 gram/15 mL solution Take 30 mLs (20 g total) by mouth 3 (three) times daily. Take enough to have 2 to 3 bowel movements a day to prevent hepatic encephalopathy.  Patient not taking: Reported on 4/28/2023 1/12/23 1/12/24  Abiel Owusu MD         PHYSICAL EXAM:   BP (!) 168/87   Pulse 92   Temp 98.7 °F (37.1 °C) (Temporal)   Resp 15   Ht 6' (1.829 m)   Wt 90.7 kg (200 lb)   SpO2 96%   BMI 27.12 kg/m²   Vitals Reviewed  General appearance:  uncomfortable  Skin: No Rash.   Neuro: Motor and sensory exams grossly intact. Good tone. Power in all 4 extremities 5/5.   HENT: Atraumatic head. Moist mucous membranes of oral cavity.  Eyes: Normal extraocular movements.   Neck: Supple. No evidence of lymphadenopathy. No thyroidomegaly.  Lungs: Clear to auscultation bilaterally. No wheezing present.   Heart: Regular, tachy. S1 and S2 present with +syst flow murmur.  No pedal edema. No JVD present.   Abdomen: Soft, non-distended, diffusely tender. No rebound tenderness/guarding. No masses or organomegaly. Bowel sounds are normal.   Extremities: No cyanosis, clubbing.  Psych/mental status: Alert and oriented. Anxious. Cooperative. Responds appropriately to questions.       EMERGENCY DEPARTMENT LABS AND IMAGING:     Labs Reviewed   CBC W/ AUTO DIFFERENTIAL - Abnormal; Notable for the following components:       Result Value    RBC 3.46 (*)     Hemoglobin 12.0 (*)     Hematocrit 35.4 (*)      (*)     MCH 34.7 (*)     RDW 15.7 (*)     Platelets 86 (*)     Immature Granulocytes 0.7 (*)     Immature Grans (Abs) 0.06 (*)     Gran % 77.6 (*)     Lymph % 15.1 (*)     All other components within normal limits   COMPREHENSIVE METABOLIC PANEL - Abnormal; Notable for the following components:    CO2 22  (*)     Creatinine 1.7 (*)     Calcium 8.6 (*)     Total Protein 8.9 (*)     Albumin 3.2 (*)     Total Bilirubin 4.4 (*)     Alkaline Phosphatase 151 (*)     AST 57 (*)     eGFR 49.1 (*)     All other components within normal limits   PROTIME-INR - Abnormal; Notable for the following components:    Prothrombin Time 13.5 (*)     INR 1.3 (*)     All other components within normal limits   LIPASE - Abnormal; Notable for the following components:    Lipase 72 (*)     All other components within normal limits   TROPONIN I HIGH SENSITIVITY - Abnormal; Notable for the following components:    Troponin I High Sensitivity 47.5 (*)     All other components within normal limits   HEMOGLOBIN - Abnormal; Notable for the following components:    Hemoglobin 10.4 (*)     All other components within normal limits   HEMOGLOBIN - Abnormal; Notable for the following components:    Hemoglobin 12.8 (*)     All other components within normal limits    Narrative:     In order to access the algorithm for troponin high  sensitivity orders access the address below:  [email or web address]   APTT   B-TYPE NATRIURETIC PEPTIDE   LACTIC ACID, PLASMA   TYPE & SCREEN       X-Ray Chest 1 View   Final Result          ASSESSMENT & PLAN:   Irvin Diaz Jr. is a 48 y.o. male admitted for upper GIB    Active Hospital Problems    Diagnosis    *Gastrointestinal hemorrhage with hematemesis        Plan    Upper GIB  Hx of cirrhosis and variceal bleeding  IVF resuscitation   1 unit prbc ordered in ER  2 large bore PIVs  Protonix and octreotide infusion  GI consulted  Plan for EGD today  Trend hgb        Diet: NPO    DVT Prophylaxis: SCD's while in bed and Encourage ambulation. OOB as tolerated.     Discharge Planning and Disposition:  Home independent    Expected LOS: 1-2    ________________________________________________________________________________    INPATIENT LIST OF MEDICATIONS     Current  Facility-Administered Medications:     HYDROmorphone injection 0.5 mg, 0.5 mg, Intravenous, Q4H PRN, Claudio Moe MD    [START ON 4/29/2023] lactulose 20 gram/30 mL solution Soln 20 g, 20 g, Oral, TID, Claudio Moe MD    LORazepam injection 1 mg, 1 mg, Intravenous, Q2H PRN, Claudio Moe MD    melatonin tablet 6 mg, 6 mg, Oral, Nightly PRN, Claudio Moe MD    nortriptyline capsule 50 mg, 50 mg, Oral, Nightly PRN, Claudio Moe MD    octreotide (SANDOSTATIN) 500 mcg in sodium chloride 0.9% 100 mL infusion, 50 mcg/hr, Intravenous, Continuous, Corby Shi MD, Held at 04/28/23 1751    ondansetron injection 4 mg, 4 mg, Intravenous, Q8H PRN, Claudio Moe MD    pantoprazole 40 mg in  mL infusion (ready to mix), 8 mg/hr, Intravenous, Continuous, Wilfredo Mcdonald MD, Last Rate: 20 mL/hr at 04/28/23 0842, 8 mg/hr at 04/28/23 0842    [START ON 4/29/2023] rifAXIMin tablet 550 mg, 550 mg, Oral, BID, Claudio Moe MD    thiamine tablet 100 mg, 100 mg, Oral, Daily, Claudio Moe MD    Current Outpatient Medications:     acamprosate (CAMPRAL) 333 mg tablet, Take 2 tablets (666 mg total) by mouth 3 (three) times daily., Disp: 180 tablet, Rfl: 11    amLODIPine (NORVASC) 10 MG tablet, Take 1 tablet (10 mg total) by mouth once daily., Disp: 30 tablet, Rfl: 11    augmented betamethasone dipropionate (DIPROLENE-AF) 0.05 % cream, Apply topically 2 (two) times daily. (Patient taking differently: Apply 1 application topically 2 (two) times daily.), Disp: 50 g, Rfl: 1    calcitRIOL (ROCALTROL) 0.25 MCG Cap, Take 1 capsule (0.25 mcg total) by mouth once daily., Disp: 30 capsule, Rfl: 1    carvediloL (COREG) 12.5 MG tablet, Take 1 tablet (12.5 mg total) by mouth 2 (two) times daily with meals., Disp: 60 tablet, Rfl: 11    clindamycin-benzoyl peroxide (BENZACLIN) gel, Apply topically 2 (two) times daily. (Patient taking differently: Apply 1  application topically 2 (two) times daily.), Disp: 50 g, Rfl: 1    ferrous gluconate (FERGON) 240 (27 FE) MG tablet, Take 2 tablets (480 mg total) by mouth 2 (two) times daily with meals., Disp: 120 tablet, Rfl: 3    folic acid (FOLVITE) 1 MG tablet, Take 1 tablet (1 mg total) by mouth once daily., Disp: 30 tablet, Rfl: 5    furosemide (LASIX) 20 MG tablet, Take 1 tablet (20 mg total) by mouth once daily., Disp: 30 tablet, Rfl: 11    nortriptyline (PAMELOR) 50 MG capsule, Take 1 capsule (50 mg total) by mouth nightly as needed (pain and insomnia)., Disp: 90 capsule, Rfl: 1    pantoprazole (PROTONIX) 40 MG tablet, Take 1 tablet (40 mg total) by mouth 2 (two) times daily., Disp: 60 tablet, Rfl: 11    rifAXIMin (XIFAXAN) 550 mg Tab, Take 1 tablet (550 mg total) by mouth 2 (two) times daily., Disp: 60 tablet, Rfl: 11    sildenafiL (VIAGRA) 100 MG tablet, Take 1 tablet (100 mg total) by mouth daily as needed for Erectile Dysfunction., Disp: 30 tablet, Rfl: 5    spironolactone (ALDACTONE) 50 MG tablet, Take 1 tablet (50 mg total) by mouth once daily., Disp: 30 tablet, Rfl: 11    thiamine 100 MG tablet, Take 1 tablet (100 mg total) by mouth once daily., Disp: 30 tablet, Rfl: 5    traMADoL (ULTRAM) 50 mg tablet, Take 1 tablet (50 mg total) by mouth 3 (three) times daily as needed for Pain., Disp: 90 tablet, Rfl: 0    lactulose (CHRONULAC) 10 gram/15 mL solution, Take 30 mLs (20 g total) by mouth 3 (three) times daily. Take enough to have 2 to 3 bowel movements a day to prevent hepatic encephalopathy. (Patient not taking: Reported on 4/28/2023), Disp: 2700 mL, Rfl: 11      Scheduled Meds:   [START ON 4/29/2023] lactulose  20 g Oral TID    [START ON 4/29/2023] rifAXIMin  550 mg Oral BID    thiamine  100 mg Oral Daily     Continuous Infusions:   octreotide (SANDOSTATIN) infusion Stopped (04/28/23 8858)    pantoprazole (PROTONIX) IV infusion 8 mg/hr (04/28/23 3223)     PRN Meds:.HYDROmorphone, lorazepam, melatonin,  nortriptyline, ondansetron      Claudio Moe  [facility] Hospitalist  04/28/2023     Sugey Gibson

## 2024-01-02 NOTE — CHART NOTE - NSCHARTNOTEFT_GEN_A_CORE
PGY 4 note    Patient seen at bedside for complains of abdominal cramping every few minutes and pressure. Reports pain has been increasing in intensity. Speculum exam revealed no change, cervix appeared 0.5cm dilated. Case discussed with MFM attending, will start magnesium for neuroprotection in anticipation of earlier delivery. At this time, there are no signs of infection, patient is afebrile, no fundal tenderness.     Dr. Sales aware

## 2024-01-02 NOTE — OB RN DELIVERY SUMMARY - NSSELHIDDEN_OBGYN_ALL_OB_FT
[NS_DeliveryAttending1_OBGYN_ALL_OB_FT:[id number]=] [NS_DeliveryAttending1_OBGYN_ALL_OB_FT:[id number]=]

## 2024-01-02 NOTE — OB PROVIDER DELIVERY SUMMARY - NSMODPPHRISK_OBGYN_A_OB
Over-distended uterus: Multiple gestation, polyhydramnios, Macrosomia/Chorioamnionitis/Hematocrit < 30%

## 2024-01-02 NOTE — OB RN DELIVERY SUMMARY - NS_TRUEKNOTA_OBGYN_ALL_OB
Cardiac Rehabilitation Home Exercise Prescription      Exercise Plan:  It is very beneficial for you to exercise at home on a regular basis.  The American Heart Association suggests at least 150 minutes per week of moderate exercise.  If you are interested in losing weight, 300 minutes or more per week of moderate exercise may be necessary.  Below is a list of guidelines for you to follow when exercising.     Warm-Up  The exercise session should always begin with at least 2-5  minutes of warming up.  This means starting out the aerobic exercise phase at a slow pace, gradually increasing the speed or intensity.     Aerobic Exercise  This phase consists of continuous, purposeful activity using larger muscle groups to elevate heart rate to a goal range.  Mimic Cardiac Rehab exercise settings.    Mode of Exercise: Treadmill, Free weights, Seated elliptical, Walking, Stationary bike, Recumbent stepper  Other Exercise:    Frequency of Exercise: 5-7 days/week  Duration of Aerobic Exercise: 31-45 minutes  Intensity of Exercise (METS) - Current:    Intensity Level - Rate of Perceived: 1-3 easy to moderate  Target Heart Rate Range:    Strength: 1 to 3 sets of 10 to 15 repetitions, Weight, based on weight restriction, below level of discomfort, Range of motion limited, based upon comfort  Other Strength:    Progression: Duration will be advanced by 1-10 minutes every week, up to 60 minutes, METs will be advanced by 0.5-1.0 per week, METs will be advanced by 0.5-1.0 every 2 weeks  Other Progression:    SpO2:    SpO2 Comment:    Other SpO2 Comment:    O2 Use/Titration:      What is a MET?  Metabolic Equivalency Table: Activities are categorized by the amount of energy it takes to perform them.  This energy level is measured in \"METs\".  One MET equals the amount of energy your body uses at rest and increases as you activity level increases.  For example: Sitting in a chair is 1 MET, doing laundry is 3 METs, 3mph/2% incline on a  treadmill is 4 METs.        Cool-Down  The exercise session should always end with at least 2-5  minutes of cooling down. This means ending the aerobic exercise at a slow pace, gradually decreasing speed or intensity.  This should be followed by stretching to increase flexibility.  Hold each stretch for 15-30 seconds.     Always listen to your body!  During exercise, if you experience any of the following Signs and Symptoms STOP exercising and alert your physician:  Chest discomfort (that may spread to other areas of the body), lightheadedness/dizziness, unusual shortness of breath, unusual fatigue, excessive sweating, or nausea/vomiting.     Special Weather Considerations:  Avoid exercise outdoors if the temperature is less than 20 degrees or more than 80 degrees.  Remember to factor in wind chills and heat indexes. Also, stay hydrated and drink water.    Cardiac Rehabilitation Life Style Changes    Goals identified on first session visit:  Increase endurance  Increase muscle strength  Develop a home exercise program  Lose weight    Tips to help you succeed with Lifestyle Change:  1. Take it one change at a time:  Do not tackle all your goals at once. Rank your risk factors and then decided which to change first.  2. Set realistic goals-set your long term goals and divide each long term goal into a short term goal.  3. Expect set-backs:  Make a plan to get back on track. A journal or log may help you stick to your plan.     Heart Healthy Diet Tips:  1. Eat a variety of foods every day. Include choices from all of the food groups.  2. Eat high-fiber foods such as fresh fruits, fresh vegetables, and whole grains.  3. Choose low-fat or reduced fat products and lean cut meats.   4. Control your portions and make healthy choices more often.  5. Read food labels and checking for serving size.  6. Try not to skip meals.  This may cause you to overeat at the next meal.             x3

## 2024-01-02 NOTE — PROGRESS NOTE ADULT - NUTRITIONAL ASSESSMENT
[facility] Staff - Please see my documentation for modifications    I saw and evaluated Ms. NABOR BELLO  with Dr. Blancas. Ms. NABOR BELLO reports positive fetal movement and no vaginal bleeding.    General:  Ms. NABOR BELLO is lying in bed.  She appears less comfortable today compared to yesterday.    Vital Signs Last 24 Hrs  T(C): 36.6 (02 Jan 2024 10:26), Max: 37.1 (01 Jan 2024 19:06)  T(F): 97.88 (02 Jan 2024 10:26), Max: 98.78 (01 Jan 2024 19:06)  HR: 83 (02 Jan 2024 10:38) (47 - 101)  BP: 105/53 (02 Jan 2024 10:22) (89/41 - 118/59)  BP(mean): --  RR: 16 (02 Jan 2024 10:22) (16 - 16)  SpO2: 100% (02 Jan 2024 10:38) (88% - 100%)    Cardiovascular:  Normal S1 S2.  No murmurs.  Pulmonary:  Clear to auscultation bilaterally.  No wheezing.  Abdomen:  Soft,  nondistended, no rebound, no guarding.  She feels uncomfortable when palpating the abdomen.  Extremities:  Nontender calves.  Neurology:  Deep tendon reflexes 2+ bilaterally.                          9.2    14.58 )-----------( 226      ( 02 Jan 2024 04:50 )             28.7                         9.3    12.40 )-----------( 222      ( 01 Jan 2024 15:29 )             27.9                         8.8    12.70 )-----------( 201      ( 31 Dec 2023 04:36 )             27.0                         9.0    11.63 )-----------( 238      ( 30 Dec 2023 22:55 )             27.1       01-02    138  |  104  |  8   ----------------------------<  98  4.3   |  22  |  0.5  01-01    139  |  105  |  8   ----------------------------<  81  3.8   |  23  |  0.5    Ca    8.6      02 Jan 2024 04:50   Mg    x   02 Jan 2024 04:50  Phos    x    02 Jan 2024 04:50  Ca    8.6      01 Jan 2024 15:29   Mg    x   01 Jan 2024 15:29  Phos    x    01 Jan 2024 15:29    TPro  5.7  /  Alb  3.2  /  TBili  <0.2  /  DBili  x   /  AST  15  /  ALT  13  /  AlkPhos  110  01-02-24      Lactate dehydrogenase  x     01-02-24  Uric acid    x    01-02-24  TPro  5.6  /  Alb  3.3  /  TBili  <0.2  /  DBili  x   /  AST  16  /  ALT  13  /  AlkPhos  106  01-01-24      Lactate dehydrogenase  x     01-01-24  Uric acid    x    01-01-24 [facility] Staff - Please see my documentation for modifications    I saw and evaluated Ms. NABOR BELLO  with Dr. Blancas. Ms. NABOR BELLO reports positive fetal movement and no vaginal bleeding.    General:  Ms. NABOR BELLO is lying in bed.  She appears less comfortable today compared to yesterday.    Vital Signs Last 24 Hrs  T(C): 36.6 (02 Jan 2024 10:26), Max: 37.1 (01 Jan 2024 19:06)  T(F): 97.88 (02 Jan 2024 10:26), Max: 98.78 (01 Jan 2024 19:06)  HR: 83 (02 Jan 2024 10:38) (47 - 101)  BP: 105/53 (02 Jan 2024 10:22) (89/41 - 118/59)  BP(mean): --  RR: 16 (02 Jan 2024 10:22) (16 - 16)  SpO2: 100% (02 Jan 2024 10:38) (88% - 100%)    Cardiovascular:  Normal S1 S2.  No murmurs.  Pulmonary:  Clear to auscultation bilaterally.  No wheezing.  Abdomen:  Soft,  nondistended, no rebound, no guarding.  She feels uncomfortable when palpating the abdomen.  Extremities:  Nontender calves.  Neurology:  Deep tendon reflexes 2+ bilaterally.                          9.2    14.58 )-----------( 226      ( 02 Jan 2024 04:50 )             28.7                         9.3    12.40 )-----------( 222      ( 01 Jan 2024 15:29 )             27.9                         8.8    12.70 )-----------( 201      ( 31 Dec 2023 04:36 )             27.0                         9.0    11.63 )-----------( 238      ( 30 Dec 2023 22:55 )             27.1       01-02    138  |  104  |  8   ----------------------------<  98  4.3   |  22  |  0.5  01-01    139  |  105  |  8   ----------------------------<  81  3.8   |  23  |  0.5    Ca    8.6      02 Jan 2024 04:50   Mg    x   02 Jan 2024 04:50  Phos    x    02 Jan 2024 04:50  Ca    8.6      01 Jan 2024 15:29   Mg    x   01 Jan 2024 15:29  Phos    x    01 Jan 2024 15:29    TPro  5.7  /  Alb  3.2  /  TBili  <0.2  /  DBili  x   /  AST  15  /  ALT  13  /  AlkPhos  110  01-02-24      Lactate dehydrogenase  x     01-02-24  Uric acid    x    01-02-24  TPro  5.6  /  Alb  3.3  /  TBili  <0.2  /  DBili  x   /  AST  16  /  ALT  13  /  AlkPhos  106  01-01-24      Lactate dehydrogenase  x     01-01-24  Uric acid    x    01-01-24

## 2024-01-02 NOTE — BRIEF OPERATIVE NOTE - NSICDXBRIEFPREOP_GEN_ALL_CORE_FT
PRE-OP DIAGNOSIS:  27 weeks gestation of pregnancy 2024 12:41:07  Jessie Monreal  History of  premature rupture of membranes (PPROM) 2024 12:41:01  Jessie Monreal  Monochorionic diamniotic twin pregnancy 2024 12:40:38  Jessie Monreal

## 2024-01-02 NOTE — CHART NOTE - NSCHARTNOTEFT_GEN_A_CORE
PACU ANESTHESIA ADMISSION NOTE      Procedure: Primary C/Section - twins  Post op diagnosis:      ____  Intubated  TV:______       Rate: ______      FiO2: ______    _x___  Patent Airway    _x___  Full return of protective reflexes    ___  Full recovery from anesthesia / back to baseline status    Vitals:            T: 97.8               BP :  113/69              R:   16           Sat:     98          P:  79      Mental Status:  _x___ Awake   _____ Alert   _____ Drowsy   _____ Sedated    Nausea/Vomiting:  _x___  NO       ______Yes,   See Post - Op Orders         Pain Scale (0-10):  __0___    Treatment: ___ None    __x__ See Post - Op/PCA Orders    Post - Operative Fluids:   ___ Oral   __x__ See Post - Op Orders    Plan: Discharge:   __Home       __x___Floor     _____Critical Care    _____  Other:_________________    Comments:  No anesthesia issues or complications noted.  Discharge when criteria met.

## 2024-01-02 NOTE — OB RN DELIVERY SUMMARY - NS_REASONNOSKINA_OBGYN_ALL_OB
Maternal Clinical Indication/[place]'s Clinical Indication Maternal Clinical Indication/[place]'s Clinical Indication

## 2024-01-02 NOTE — PROGRESS NOTE ADULT - ASSESSMENT
A/P: 28y [id number] at 27w5d, GBS neg, mono-di twins with Hx TTTS at 16w, s/p laser surgery at [place], d#7 PPROM,  on magnesium for neuroprotection.  #PPROM,  @2100  - next labs and magnesium level at 0430  - continuous EFM and toco  - Neuro checks [id number]   - Latency antibiotics, ampicillin 2g q6hr x48hr, azithro 1g x1, followed by 500mg amoxacillin q8hr x5d, currently on amoxicillin  - S/p betamethasone x2 doses over 24hr for fetal lung maturity (-)  - Monitor for s/s of chorioamnionitis  - NICU consult done  #Pregnancy  - Elevated GCT, will complete GTT a few days after course of betamethasone  - Regular diet.    -->In the meantime, continue NPH 8 Units before dinner and 6 Units before breakfast.                                           Lispro 6 Units before dinner and 6 Units before breakfast.   -->Discontinue standing dose of insulin if FBS < 95 and 2h PP <120.   --> Continue to monitor FS fasting and 2hr postprandial with ISS coverage as indicated   A/P: 28y [id number] at 27w5d, GBS neg, mono-di twins with Hx TTTS at 16w, s/p laser surgery at [place], d#7 PPROM,  on magnesium for neuroprotection.  #PPROM,  @2100  - next labs and magnesium level at 0430  - continuous EFM and toco  - Neuro checks [id number]   - Latency antibiotics, ampicillin 2g q6hr x48hr, azithro 1g x1, followed by 500mg amoxacillin q8hr x5d, currently on amoxicillin  - S/p betamethasone x2 doses over 24hr for fetal lung maturity (-)  - Monitor for s/s of chorioamnionitis  - NICU consult done  #Pregnancy  - Elevated GCT, will complete GTT a few days after course of betamethasone  - Regular diet.    -->In the meantime, continue NPH 8 Units before dinner and 6 Units before breakfast.                                           Lispro 6 Units before dinner and 6 Units before breakfast.   -->Discontinue standing dose of insulin if FBS < 95 and 2h PP <120.   --> Continue to monitor FS fasting and 2hr postprandial with ISS coverage as indicated

## 2024-01-02 NOTE — BRIEF OPERATIVE NOTE - NSICDXBRIEFPOSTOP_GEN_ALL_CORE_FT
POST-OP DIAGNOSIS:  Monochorionic diamniotic twin pregnancy 2024 12:41:55  Jessie Monreal   premature rupture of membranes 2024 12:41:15  Jessie Monreal

## 2024-01-02 NOTE — CHART NOTE - NSCHARTNOTESELECT_GEN_ALL_CORE
Event Note
NST
OB/Event Note
PACU admission note
Transfer Note
Event Note

## 2024-01-02 NOTE — OB PROVIDER DELIVERY SUMMARY - NSSELHIDDEN_OBGYN_ALL_OB_FT
[NS_DeliveryAttending1_OBGYN_ALL_OB_FT:[id number]] [NS_DeliveryAttending1_OBGYN_ALL_OB_FT:[id number]]

## 2024-01-02 NOTE — PROGRESS NOTE ADULT - ASSESSMENT
29yo P3 S/P LTCS at 27.5 di/di twins for suspected chorio POD#0, s/p 1U intraop, recovering well.    - encourage ambulation  - PO hydration   - Continue Diet as tolerated  - DVT ppx: SCDs and Lovenox   - jones cocktail  - triples for 24 hours  - Incentive Spirometry bedside and encouraged   - f/u 2000 labs  - Vital signs [id number]   - [place] in place until AM       29yo P3 S/P LTCS at 27.5 di/di twins for suspected chorio POD#0, s/p 1U intraop, recovering well.    - encourage ambulation  - PO hydration   - Continue Diet as tolerated  - DVT ppx: SCDs and Lovenox   - jones cocktail  - triples for 24 hours  - Incentive Spirometry bedside and encouraged   - f/u 2000 labs  - Vital signs [id number]   - [place] in place until AM

## 2024-01-02 NOTE — PROGRESS NOTE ADULT - SUBJECTIVE AND OBJECTIVE BOX
PGY 3 Note    Chief Complaint: Post  section    HPI: Pt doing well, pain well controlled. no acute complaints. She is not yet ambulating, voiding, passing gas. She is tolerating regular diet without difficulty. Denies heavy vaginal bleeding. Denies fevers, chills, SOB, CP, nausea, vomiting, diarrhea or constipation.    ROS: Denies cardiovascular or respiratory symptoms    PAST MEDICAL & SURGICAL HISTORY:  No pertinent past medical history      H/O dilation and curettage    Physical Exam  Vital Signs Last 24 Hrs  T(F): 98.6 (2024 15:43), Max: 98.78 (2024 19:06)  HR: 77 (2024 15:43) (47 - 117)  BP: 105/58 (2024 15:43) (89/41 - 117/56)  RR: 18 (2024 15:43) (16 - 18)    Physical exam:  General - AAOx3, NAD  Heart - S1S2, RRR  Lungs - CTA BL  Abdomen:  - Soft, appropriately tender, mildly distended, BS+. Clean, dry, intact steri strips in place over pfannenstiel skin incision.  - Fundus firm, appropriately tender, below the umbilicus  Pelvis/Vagina - Normal Lochia  Extremities - No calf tenderness, no swelling    Labs:                        9.2    14.58 )-----------( 226      ( 2024 04:50 )             28.7                         9.3    12.40 )-----------( 222      ( 2024 15:29 )             27.9     Antibody Screen: NEG (23 @ 22:55)    UOmin 48cc/hr: (7443-2507) 250cc ([id number]) 150cc PGY 3 Note    Chief Complaint: Post  section    HPI: Pt doing well, pain well controlled. no acute complaints. She is not yet ambulating, voiding, passing gas. She is tolerating regular diet without difficulty. Denies heavy vaginal bleeding. Denies fevers, chills, SOB, CP, nausea, vomiting, diarrhea or constipation.    ROS: Denies cardiovascular or respiratory symptoms    PAST MEDICAL & SURGICAL HISTORY:  No pertinent past medical history      H/O dilation and curettage    Physical Exam  Vital Signs Last 24 Hrs  T(F): 98.6 (2024 15:43), Max: 98.78 (2024 19:06)  HR: 77 (2024 15:43) (47 - 117)  BP: 105/58 (2024 15:43) (89/41 - 117/56)  RR: 18 (2024 15:43) (16 - 18)    Physical exam:  General - AAOx3, NAD  Heart - S1S2, RRR  Lungs - CTA BL  Abdomen:  - Soft, appropriately tender, mildly distended, BS+. Clean, dry, intact steri strips in place over pfannenstiel skin incision.  - Fundus firm, appropriately tender, below the umbilicus  Pelvis/Vagina - Normal Lochia  Extremities - No calf tenderness, no swelling    Labs:                        9.2    14.58 )-----------( 226      ( 2024 04:50 )             28.7                         9.3    12.40 )-----------( 222      ( 2024 15:29 )             27.9     Antibody Screen: NEG (23 @ 22:55)    UOmin 48cc/hr: (6492-5085) 250cc ([id number]) 150cc

## 2024-01-02 NOTE — PRE-ANESTHESIA EVALUATION ADULT - NSPREOPDXFT_GEN_ALL_CORE
Nutrition Education                     Nutrition Education: Bariatric diet review  Goal: Tolerate oral diet   Intervention goal status: Initiated   Time Frame to Achieve Goal: Met   Dietitian will monitor: Food and beverage intake     Patient seen reports tolerating sips of clears. Reviewed appropriate portions/timing and sip/exhale/swallow. Has handbook and high protein/very low carb shakes at home. Encouraged ambulation, sitting up tall with PO intake, stand if discomfort after meals; pt to start protein shakes once home. Will send up Gelatein (fruit punch flavor) for pt to try now; will add 1 Gelatein daily at breakfast - educated pt to only take very small bite (tip of teaspoon) of Gelatein at a time, may not finish 1 cup in a day at this point. All questions answered; pt to follow up with outpatient bariatric dietitian. Will continue to follow per protocol.     PPROM @ 27 wks

## 2024-01-02 NOTE — PROGRESS NOTE ADULT - SUBJECTIVE AND OBJECTIVE BOX
MFM Procedure note    Monochorionic diamniotic twin gestation  PPROM        Bedside Ultrasound:    Twin A vertex.  Towards the maternal left.  Deepest vertical pocket 2.33 cm.  Fetal heart rate 140 beats per minute.  Biophysical profile 10/10.   MCA Doppler Peak systolic velocity 31 cm/sec (not suggestive of severe fetal anemia).  UA Doppler forward end diastolic flow.  Twin B transverse head to the maternal right.  Towards the maternal right.  Deepest vertical pocket 2.21 cm.  Fetal heart rate 137 beats per minute.  Biophysical profile 10/10.   MCA Doppler Peak systolic velocity 50 cm/sec (not suggestive of severe fetal anemia).  UA Doppler forward end diastolic flow.    The feet of twin B are presenting.    Both twins Baseline 135 beats per minute, moderate variability, accelerations, occasional variables, no contractions.    Reassuring  tetsing and Doppler studies.    Luisito Sales MD

## 2024-01-02 NOTE — BRIEF OPERATIVE NOTE - OPERATION/FINDINGS
male infant in vtx, breech presentation, apgars 9/9 for twin A, and 8/8 for twin B, normal uterus, tubes and ovaries bilaterally

## 2024-01-02 NOTE — PROGRESS NOTE ADULT - SUBJECTIVE AND OBJECTIVE BOX
PGY3 Note    Gestational age: 27w5d  Hospital day: #6; ROM d#7    HPI: Ms. Lion is a 28 year old  at 27w5d YASMIN 24 with monochorionic/diamniotic twin pregnancy with PPROM on  at 2100. She had TTTS and is s/p laser ablation at around 16 weeks gestation.  On 2023 she complained of back pain and increased leakage of fluid, transferred to L&D for continuous monitoring. On continuous monitoring, fetus A also displays irregular variable and prolonged late decelerations that recover to baseline with reassuring moderate variability. Since late last night, she also reports pelvic pressure and 4/10 baseline cramping in lower pelvis that reaches 6/10 at times. Reports the vaginal leaking stopped yesterday. Currently on magnesium for neuroprotection Denies vaginal bleeding. Reports +FM x2. Denies HA, CP, SOB, N/V, fevers, chills, urinary sx, changes in bowel habits.     Vital Signs Last 24 Hrs  Vital Signs Last 24 Hrs  T(C): 36.7 (2024 04:59), Max: 37.8 (2024 06:55)  T(F): 98.06 (2024 04:59), Max: 100.04 (2024 06:55)  HR: 71 (2024 06:11) (47 - 101)  BP: 96/50 (2024 05:49) (89/41 - 118/59)  RR: 16 (2024 04:59) (16 - 16)  SpO2: 97% (2024 06:11) (88% - 100%)    Physical Exam  General: NAD, lying comfortably in bed  Heart: RRR, S1 S2 WNL  Lungs: CTAB  Neuro: 2+ DTR bilateral UE  Abdomen: Soft, nontender, no distension, gravid.  Fundus soft.  Extr: No calf tenderness, no swelling    MEDICATIONS  (STANDING):  amoxicillin 500 milliGRAM(s) Oral every 8 hours  ascorbic acid 500 milliGRAM(s) Oral daily  cholecalciferol 1000 Unit(s) Oral daily  dextrose 5%. 1000 milliLiter(s) (100 mL/Hr) IV Continuous <Continuous>  dextrose 5%. 1000 milliLiter(s) (50 mL/Hr) IV Continuous <Continuous>  dextrose 50% Injectable 25 Gram(s) IV Push once  dextrose 50% Injectable 25 Gram(s) IV Push once  dextrose 50% Injectable 12.5 Gram(s) IV Push once  famotidine    Tablet 20 milliGRAM(s) Oral daily  folic acid 1 milliGRAM(s) Oral daily  glucagon  Injectable 1 milliGRAM(s) IntraMuscular once  guaiFENesin  milliGRAM(s) Oral every 12 hours  insulin lispro Injectable (ADMELOG) 6 Unit(s) SubCutaneous before dinner  insulin lispro Injectable (ADMELOG) 6 Unit(s) SubCutaneous before breakfast  insulin NPH human recombinant 6 Unit(s) SubCutaneous before breakfast  insulin NPH human recombinant 8 Unit(s) SubCutaneous before dinner  lactated ringers. 1000 milliLiter(s) (75 mL/Hr) IV Continuous <Continuous>  magnesium sulfate Infusion 1 Gm/Hr (25 mL/Hr) IV Continuous <Continuous>  prenatal multivitamin 1 Tablet(s) Oral daily  zinc sulfate 220 milliGRAM(s) Oral daily    MEDICATIONS  (PRN):  dextrose Oral Gel 15 Gram(s) Oral once PRN Blood Glucose LESS THAN 70 milliGRAM(s)/deciliter  sodium chloride 0.65% Nasal 1 Spray(s) Both Nostrils daily PRN Nasal Congestion    LAB:                         8.8    12.70 )-----------( 201      ( 31 Dec 2023 04:36 )             27.0       137  |  106  |  9<L>  ----------------------------<  90  4.1   |  20  |  0.6<L>    Ca    8.2<L>      30 Dec 2023 22:55    TPro  6.1  /  Alb  3.3<L>  /  TBili  <0.2  /  DBili  x   /  AST  16  /  ALT  13  /  AlkPhos  111      ---   @0418 11.44>10.2/30.8<253, coags 10.4/0.94/30.6, fibrinogen 511, A1C 5.6%, O pos neg, HIV neg, RPR neg, UA neg, GBS neg, Ucx neg  @0900 14.03>10.5/30.7<236, 134/4.2/103/19/7/0.5<181, AST/ALT 15/11, Mag 4.3  GC/CT: neg  @1900 13.87>9.6/28.7<244, magnesium 4.9  @0100 15.02>10.0/29.8<244, M.5   @0430 13.98>8.6/26.4<212  STAT 11.63>9.0/27.1<238, 137/4.1/106/20/11/9/0.6<90, AST/ALT 16/13, lactate 1.0, O pos anti neg   @0430 12.70>8.8/27.0<201, Fe total 25 (L), UIBC 478 (H), TIBC 503 (H), %Fe sat 5 (L)   12.40>9.3/27.9<222, 139/3.8/105/23/8/0.5<81, AST/ALT 1613, ferritin 10 (L), unsat iron binding capacity 478 (H), TIBC 503 (H), %iron sat 5 (L), folate 11.6 (N), B12 296 (N)   14.58>9.2/28.7<226, F/U CMP     FS: 111/158/192(4U)   FS: 181(2U)/191(4u)/143/159/149/128   FS: 144/142/105/119   FS: 110/113(post breakfast)/95/111(post lunch)/153/122 (post dinner)   FS: 109/96/152 (post lunch)/111/123       @ 26w5d: Cx Yahir 4.0 cm, Twin A:  mvp 4.7 cm, Twin B: mvp 3.8 cm.  @ 25w4d A- 730g 12%, B- 705g 8%; discordance 3%. Normal AFV, Dopplers, BPP x2.  Fetal echos at [place], normal x2     BSS: Twin A: vtx, maternal left, posterior placenta, mvp 4.5cm, bpp 8/8, Twin B: vtx, maternal right, posterior placenta, mvp 4.3cm, bpp 8/8   BSS A - breech, MVP 2.26cm, MCA PS 32.65 (nl), UA S/D 2.36, BPP 88, B - transverse, MVP 2.64cm, MCA PS 35 (nl), UA S/D 2.57, BPP 8/8.   BSS A - transverse, maternal head left, MVP 2.94cm,  BPP 88, B - transverse, maternal head right, MVP 3.03cm, BPP 8/8.   BSS US Baby A: vertex, MVP 4.59cm, BPP 8/8; baby B transverse back up head maternal right, MVP 4.56cm, BPP 8/8   PM BSS US Baby A: breech, MVP 4.30cm, BPP 8/8; baby B transverse, back up head maternal right, MVP 4.43cm   BSUS: Baby A: breech, MVP 2.42, BPP 8/8; baby B vtx, MVP 4.21cm, BPP 8/8   BSS A - vertex.  Towards the maternal left.  DVP 2.31 cm.  BPP 10/10.  B -  transverse head to the maternal right.  Towards the maternal right.  DVP 3.25 cm.  BPP 10/10.   PGY3 Note    Gestational age: 27w5d  Hospital day: #6; ROM d#7    HPI: Ms. Lion is a 28 year old  at 27w5d YASMIN 24 with monochorionic/diamniotic twin pregnancy with PPROM on  at 2100. She had TTTS and is s/p laser ablation at around 16 weeks gestation.  On 2023 she complained of back pain and increased leakage of fluid, transferred to L&D for continuous monitoring. On continuous monitoring, fetus A also displays irregular variable and prolonged late decelerations that recover to baseline with reassuring moderate variability. Since late last night, she also reports pelvic pressure and 4/10 baseline cramping in lower pelvis that reaches 6/10 at times. Reports the vaginal leaking stopped yesterday. Currently on magnesium for neuroprotection Denies vaginal bleeding. Reports +FM x2. Denies HA, CP, SOB, N/V, fevers, chills, urinary sx, changes in bowel habits.     Vital Signs Last 24 Hrs  Vital Signs Last 24 Hrs  T(C): 36.7 (2024 04:59), Max: 37.8 (2024 06:55)  T(F): 98.06 (2024 04:59), Max: 100.04 (2024 06:55)  HR: 71 (2024 06:11) (47 - 101)  BP: 96/50 (2024 05:49) (89/41 - 118/59)  RR: 16 (2024 04:59) (16 - 16)  SpO2: 97% (2024 06:11) (88% - 100%)    Physical Exam  General: NAD, lying comfortably in bed  Heart: RRR, S1 S2 WNL  Lungs: CTAB  Neuro: 2+ DTR bilateral UE  Abdomen: Soft, nontender, no distension, gravid.  Fundus soft.  Extr: No calf tenderness, no swelling    MEDICATIONS  (STANDING):  amoxicillin 500 milliGRAM(s) Oral every 8 hours  ascorbic acid 500 milliGRAM(s) Oral daily  cholecalciferol 1000 Unit(s) Oral daily  dextrose 5%. 1000 milliLiter(s) (100 mL/Hr) IV Continuous <Continuous>  dextrose 5%. 1000 milliLiter(s) (50 mL/Hr) IV Continuous <Continuous>  dextrose 50% Injectable 25 Gram(s) IV Push once  dextrose 50% Injectable 25 Gram(s) IV Push once  dextrose 50% Injectable 12.5 Gram(s) IV Push once  famotidine    Tablet 20 milliGRAM(s) Oral daily  folic acid 1 milliGRAM(s) Oral daily  glucagon  Injectable 1 milliGRAM(s) IntraMuscular once  guaiFENesin  milliGRAM(s) Oral every 12 hours  insulin lispro Injectable (ADMELOG) 6 Unit(s) SubCutaneous before dinner  insulin lispro Injectable (ADMELOG) 6 Unit(s) SubCutaneous before breakfast  insulin NPH human recombinant 6 Unit(s) SubCutaneous before breakfast  insulin NPH human recombinant 8 Unit(s) SubCutaneous before dinner  lactated ringers. 1000 milliLiter(s) (75 mL/Hr) IV Continuous <Continuous>  magnesium sulfate Infusion 1 Gm/Hr (25 mL/Hr) IV Continuous <Continuous>  prenatal multivitamin 1 Tablet(s) Oral daily  zinc sulfate 220 milliGRAM(s) Oral daily    MEDICATIONS  (PRN):  dextrose Oral Gel 15 Gram(s) Oral once PRN Blood Glucose LESS THAN 70 milliGRAM(s)/deciliter  sodium chloride 0.65% Nasal 1 Spray(s) Both Nostrils daily PRN Nasal Congestion    LAB:                         8.8    12.70 )-----------( 201      ( 31 Dec 2023 04:36 )             27.0       137  |  106  |  9<L>  ----------------------------<  90  4.1   |  20  |  0.6<L>    Ca    8.2<L>      30 Dec 2023 22:55    TPro  6.1  /  Alb  3.3<L>  /  TBili  <0.2  /  DBili  x   /  AST  16  /  ALT  13  /  AlkPhos  111      ---   @0418 11.44>10.2/30.8<253, coags 10.4/0.94/30.6, fibrinogen 511, A1C 5.6%, O pos neg, HIV neg, RPR neg, UA neg, GBS neg, Ucx neg  @0900 14.03>10.5/30.7<236, 134/4.2/103/19/7/0.5<181, AST/ALT 15/11, Mag 4.3  GC/CT: neg  @1900 13.87>9.6/28.7<244, magnesium 4.9  @0100 15.02>10.0/29.8<244, M.5   @0430 13.98>8.6/26.4<212  STAT 11.63>9.0/27.1<238, 137/4.1/106/20/11/9/0.6<90, AST/ALT 16/13, lactate 1.0, O pos anti neg   @0430 12.70>8.8/27.0<201, Fe total 25 (L), UIBC 478 (H), TIBC 503 (H), %Fe sat 5 (L)   12.40>9.3/27.9<222, 139/3.8/105/23/8/0.5<81, AST/ALT 1613, ferritin 10 (L), unsat iron binding capacity 478 (H), TIBC 503 (H), %iron sat 5 (L), folate 11.6 (N), B12 296 (N)   14.58>9.2/28.7<226, F/U CMP     FS: 111/158/192(4U)   FS: 181(2U)/191(4u)/143/159/149/128   FS: 144/142/105/119   FS: 110/113(post breakfast)/95/111(post lunch)/153/122 (post dinner)   FS: 109/96/152 (post lunch)/111/123       @ 26w5d: Cx Yahir 4.0 cm, Twin A:  mvp 4.7 cm, Twin B: mvp 3.8 cm.  @ 25w4d A- 730g 12%, B- 705g 8%; discordance 3%. Normal AFV, Dopplers, BPP x2.  Fetal echos at [place], normal x2     BSS: Twin A: vtx, maternal left, posterior placenta, mvp 4.5cm, bpp 8/8, Twin B: vtx, maternal right, posterior placenta, mvp 4.3cm, bpp 8/8   BSS A - breech, MVP 2.26cm, MCA PS 32.65 (nl), UA S/D 2.36, BPP 88, B - transverse, MVP 2.64cm, MCA PS 35 (nl), UA S/D 2.57, BPP 8/8.   BSS A - transverse, maternal head left, MVP 2.94cm,  BPP 88, B - transverse, maternal head right, MVP 3.03cm, BPP 8/8.   BSS US Baby A: vertex, MVP 4.59cm, BPP 8/8; baby B transverse back up head maternal right, MVP 4.56cm, BPP 8/8   PM BSS US Baby A: breech, MVP 4.30cm, BPP 8/8; baby B transverse, back up head maternal right, MVP 4.43cm   BSUS: Baby A: breech, MVP 2.42, BPP 8/8; baby B vtx, MVP 4.21cm, BPP 8/8   BSS A - vertex.  Towards the maternal left.  DVP 2.31 cm.  BPP 10/10.  B -  transverse head to the maternal right.  Towards the maternal right.  DVP 3.25 cm.  BPP 10/10.

## 2024-01-02 NOTE — PROGRESS NOTE ADULT - ASSESSMENT
A/P: 29yo  at 27w5d, GBS neg, mono-di twins with Hx TTTS at 16w, s/p laser surgery at [place], d#7 PPROM, with reassuring maternal and fetal status at this time.     1. PPROM,  @2100  - Latency antibiotics, ampicillin 2g q6hr x48hr, azithro 1g x1, followed by 500mg amoxacillin q8hr x5d, currently on amoxicillin  - S/p betamethasone x2 doses over 24hr for fetal lung maturity (-)  - Monitor for s/s of chorioamnionitis  - NICU consult done  -->currently monitored on L&D, NPO, FS q4hr while NPO, holding insulin  - Magnesium restarted at 0150 due to fetus A irregular variable and prolonged late decelerations that recover to baseline with reassuring moderate variability.    2.  Mono-Di Twins, variable lie for both fetuses, with  premature rupture of membranes.  Unclear which sac is ruptured.  Fetal testing and assessment is reassuring.-     3. S/p laser surgery at [facility] at 16wk GA, TTTS now resolved.  Both twins now have normal MCA dopplers.   P3ROM is a described complication of laser ablation therapy.    4. Normocytic anemia: Unclear whether due to hemodilution or blood loss.  Regardless, pt may benefit from Fe supplementation, po or IV.   -->S/p venofer x1, vit C, vit D  -->Consider hematology consult for IV venofer or po iron supplement.         4. Pregnancy  - Elevated GCT, will complete GTT a few days after course of betamethasone  -->In the meantime, continue NPH 8 Units before dinner and 6 Units before breakfast.                                           Lispro 6 Units before dinner and 6 Units before breakfast.                     However, will hold while NPO.  -->Discontinue standing dose of insulin if FBS < 95 and 2h PP <120.   -->Continue to monitor FS fasting and 2hr postprandial with ISS coverage as indicated  - Cont efm/toco. Vitals.     A/P: 27yo  at 27w5d, GBS neg, mono-di twins with Hx TTTS at 16w, s/p laser surgery at [place], d#7 PPROM, with reassuring maternal and fetal status at this time.     1. PPROM,  @2100  - Latency antibiotics, ampicillin 2g q6hr x48hr, azithro 1g x1, followed by 500mg amoxacillin q8hr x5d, currently on amoxicillin  - S/p betamethasone x2 doses over 24hr for fetal lung maturity (-)  - Monitor for s/s of chorioamnionitis  - NICU consult done  -->currently monitored on L&D, NPO, FS q4hr while NPO, holding insulin  - Magnesium restarted at 0150 due to fetus A irregular variable and prolonged late decelerations that recover to baseline with reassuring moderate variability.    2.  Mono-Di Twins, variable lie for both fetuses, with  premature rupture of membranes.  Unclear which sac is ruptured.  Fetal testing and assessment is reassuring.-     3. S/p laser surgery at [facility] at 16wk GA, TTTS now resolved.  Both twins now have normal MCA dopplers.   P3ROM is a described complication of laser ablation therapy.    4. Normocytic anemia: Unclear whether due to hemodilution or blood loss.  Regardless, pt may benefit from Fe supplementation, po or IV.   -->S/p venofer x1, vit C, vit D  -->Consider hematology consult for IV venofer or po iron supplement.         4. Pregnancy  - Elevated GCT, will complete GTT a few days after course of betamethasone  -->In the meantime, continue NPH 8 Units before dinner and 6 Units before breakfast.                                           Lispro 6 Units before dinner and 6 Units before breakfast.                     However, will hold while NPO.  -->Discontinue standing dose of insulin if FBS < 95 and 2h PP <120.   -->Continue to monitor FS fasting and 2hr postprandial with ISS coverage as indicated  - Cont efm/toco. Vitals.     A/P: 27yo  at 27w5d, GBS neg, mono-di twins with Hx TTTS at 16w, s/p laser surgery at [place], d#7 PPROM, with reassuring maternal and fetal status at this time.     1. PPROM,  @2100  - Latency antibiotics, ampicillin 2g q6hr x48hr, azithro 1g x1, followed by 500mg amoxacillin q8hr x5d, currently on amoxicillin  - S/p betamethasone x2 doses over 24hr for fetal lung maturity (-)  - Monitor for s/s of chorioamnionitis  - NICU consult done  -->currently monitored on L&D, NPO, FS q4hr while NPO, holding insulin  - Magnesium restarted at 0150 due to fetus A irregular variable and prolonged late decelerations that recover to baseline with reassuring moderate variability.    2.  Mono-Di Twins, variable lie for both fetuses, with  premature rupture of membranes.  Unclear which sac is ruptured.  Fetal testing and assessment is reassuring.-     3. S/p laser surgery at [facility] at 16wk GA, TTTS now resolved.  Both twins now have normal MCA dopplers.   P3ROM is a described complication of laser ablation therapy.    4. Normocytic anemia: Unclear whether due to hemodilution or blood loss.  Regardless, pt may benefit from Fe supplementation, po or IV.   -->S/p venofer x1, vit C, vit D         4. Pregnancy  - Elevated GCT, will complete GTT a few days after course of betamethasone  -->In the meantime, continue NPH 8 Units before dinner and 6 Units before breakfast.                                           Lispro 6 Units before dinner and 6 Units before breakfast.                     However, will hold while NPO.  -->Discontinue standing dose of insulin if FBS < 95 and 2h PP <120.   -->Continue to monitor FS fasting and 2hr postprandial with ISS coverage as indicated  - Cont efm/toco. Vitals.     A/P: 29yo  at 27w5d, GBS neg, mono-di twins with Hx TTTS at 16w, s/p laser surgery at [place], d#7 PPROM, with reassuring maternal and fetal status at this time.     1. PPROM,  @2100  - Latency antibiotics, ampicillin 2g q6hr x48hr, azithro 1g x1, followed by 500mg amoxacillin q8hr x5d, currently on amoxicillin  - S/p betamethasone x2 doses over 24hr for fetal lung maturity (-)  - Monitor for s/s of chorioamnionitis  - NICU consult done  -->currently monitored on L&D, NPO, FS q4hr while NPO, holding insulin  - Magnesium restarted at 0150 due to fetus A irregular variable and prolonged late decelerations that recover to baseline with reassuring moderate variability.    2.  Mono-Di Twins, variable lie for both fetuses, with  premature rupture of membranes.  Unclear which sac is ruptured.  Fetal testing and assessment is reassuring.-     3. S/p laser surgery at [facility] at 16wk GA, TTTS now resolved.  Both twins now have normal MCA dopplers.   P3ROM is a described complication of laser ablation therapy.    4. Normocytic anemia: Unclear whether due to hemodilution or blood loss.  Regardless, pt may benefit from Fe supplementation, po or IV.   -->S/p venofer x1, vit C, vit D         4. Pregnancy  - Elevated GCT, will complete GTT a few days after course of betamethasone  -->In the meantime, continue NPH 8 Units before dinner and 6 Units before breakfast.                                           Lispro 6 Units before dinner and 6 Units before breakfast.                     However, will hold while NPO.  -->Discontinue standing dose of insulin if FBS < 95 and 2h PP <120.   -->Continue to monitor FS fasting and 2hr postprandial with ISS coverage as indicated  - Cont efm/toco. Vitals.

## 2024-01-02 NOTE — OB RN DELIVERY SUMMARY - NS_REASONNOSKINB_OBGYN_ALL_OB
Maternal Clinical Indication/[place]'s Clinical Indication Maternal Clinical Indication/[place]'s Clinical Indication

## 2024-01-02 NOTE — OB RN INTRAOPERATIVE NOTE - NSSELHIDDEN_OBGYN_ALL_OB_FT
[NS_DeliveryAttending1_OBGYN_ALL_OB_FT:[id number]] [NS_DeliveryAttending1_[email or web address]_OB_FT:[id number] [NS_DeliveryAttending1_[email or web address]_OB_FT:[id number]],[NS_DeliveryAssist1_OBGYN_ALL_OB_FT:[email or web address]],[NS_DeliveryRN_OBGYN_ALL_OB_FT:[id number] [NS_DeliveryAttending1_OBGYN_ALL_OB_FT:[id number]],[NS_DeliveryAssist1_OBGYN_ALL_OB_FT:[email or web address]=],[NS_DeliveryRN_OBGYN_ALL_OB_FT:[id number]]

## 2024-01-02 NOTE — PROGRESS NOTE ADULT - SUBJECTIVE AND OBJECTIVE BOX
PGY 1 Magnesium Note     Subjective:   Pt evaluated at bedside for magnesium check. Doing well, no complaints or contraction pains at this time. She denies flushing, headache, vision changes, dizziness, SOB, chest pain, RUQ/epigastric pain.      Objective:   Vital signs: T(F): 98.06 (01-02-24 @ 02:20), Max: 98.78 (01-01-24 @ 19:06)  HR: 65 (01-02-24 @ 03:49) (47 - 101)  BP: 96/55 (01-02-24 @ 03:48) (89/41 - 118/59)  RR: 16 (01-02-24 @ 02:20) (16 - 16)  SpO2: 96% (01-02-24 @ 03:49) (88% - 100%)    01-01-24 @ 07:01  -  01-02-24 @ 03:52  --------------------------------------------------------  IN: 125 mL / OUT: 150 mL / NET: -25 mL        Gen: NAD, AAOx3  CV: S1S2, RRR, no M/R/G  Pulm: CTAB  Ext: no calf tenderness or edema SCDs in place  Neuro: 2+ DTR bilaterally  Abd: soft, gravid, nontender, no rebound or guarding, no epigastric tenderness    EFM: (Twin A) 130/mod/+accel (Twin B) 140/mod/+accel  [place]: none  SVE:  deferred    Medications:    (ADM OVERRIDE): 1 (01-02-24 @ 01:36)  (ADM OVERRIDE): 1 (01-01-24 @ 11:44)  (ADM OVERRIDE): 1 (01-02-24 @ 01:36)  amoxicillin: 500 (01-01-24 @ 22:09),  500 (01-01-24 @ 14:12),  500 (01-01-24 @ 06:48)  ascorbic acid: 500 (01-01-24 @ 12:08)  cholecalciferol: 1000 (01-01-24 @ 12:08)  famotidine    Tablet: 20 (01-01-24 @ 12:08)  folic acid: 1 (01-01-24 @ 12:08)  guaiFENesin ER: 600 (01-01-24 @ 18:15),  600 (01-01-24 @ 06:48)  insulin lispro Injectable (ADMELOG): 6 (01-01-24 @ 16:28)  insulin NPH human recombinant: 8 (01-01-24 @ 16:27)  insulin NPH human recombinant: 6 (01-01-24 @ 08:30)  magnesium sulfate  IVPB: 300 (01-02-24 @ 01:49)  magnesium sulfate Infusion: 25 (01-02-24 @ 01:27)  prenatal multivitamin: 1 (01-01-24 @ 12:07)  sodium chloride 0.65% Nasal: 1 (01-01-24 @ 16:28)  zinc sulfate: 220 (01-01-24 @ 12:08)      Labs:                         9.3    12.40 )-----------( 222      ( 01 Jan 2024 15:29 )             27.9   01-01    139  |  105  |  8<L>  ----------------------------<  81  3.8   |  23  |  0.5<L>    Ca    8.6      01 Jan 2024 15:29    TPro  5.6<L>  /  Alb  3.3<L>  /  TBili  <0.2  /  DBili  x   /  AST  16  /  ALT  13  /  AlkPhos  106  01-01      Urinalysis Basic - ( 01 Jan 2024 15:29 )    Color: x / Appearance: x / SG: x / pH: x  Gluc: 81 mg/dL / Ketone: x  / Bili: x / Urobili: x   Blood: x / Protein: x / Nitrite: x   Leuk Esterase: x / RBC: x / WBC x   Sq Epi: x / Non Sq Epi: x / Bacteria: x           PGY 1 Magnesium Note     Subjective:   Pt evaluated at bedside for magnesium check. Doing well, no complaints or contraction pains at this time. She denies flushing, headache, vision changes, dizziness, SOB, chest pain, RUQ/epigastric pain.      Objective:   Vital signs: T(F): 98.06 (01-02-24 @ 02:20), Max: 98.78 (01-01-24 @ 19:06)  HR: 65 (01-02-24 @ 03:49) (47 - 101)  BP: 96/55 (01-02-24 @ 03:48) (89/41 - 118/59)  RR: 16 (01-02-24 @ 02:20) (16 - 16)  SpO2: 96% (01-02-24 @ 03:49) (88% - 100%)    01-01-24 @ 07:01  -  01-02-24 @ 03:52  --------------------------------------------------------  IN: 125 mL / OUT: 150 mL / NET: -25 mL        Gen: NAD, AAOx3  CV: S1S2, RRR, no M/R/G  Pulm: CTAB  Ext: no calf tenderness or edema SCDs in place  Neuro: 2+ DTR bilaterally  Abd: soft, gravid, nontender, no rebound or guarding, no epigastric tenderness    EFM: (Twin A) 130/mod/+accel (Twin B) 140/mod/+accel  [place]: none  SVE:  deferred    Medications:    (ADM OVERRIDE): 1 (01-02-24 @ 01:36)  (ADM OVERRIDE): 1 (01-01-24 @ 11:44)  (ADM OVERRIDE): 1 (01-02-24 @ 01:36)  amoxicillin: 500 (01-01-24 @ 22:09),  500 (01-01-24 @ 14:12),  500 (01-01-24 @ 06:48)  ascorbic acid: 500 (01-01-24 @ 12:08)  cholecalciferol: 1000 (01-01-24 @ 12:08)  famotidine    Tablet: 20 (01-01-24 @ 12:08)  folic acid: 1 (01-01-24 @ 12:08)  guaiFENesin ER: 600 (01-01-24 @ 18:15),  600 (01-01-24 @ 06:48)  insulin lispro Injectable (ADMELOG): 6 (01-01-24 @ 16:28)  insulin NPH human recombinant: 8 (01-01-24 @ 16:27)  insulin NPH human recombinant: 6 (01-01-24 @ 08:30)  magnesium sulfate  IVPB: 300 (01-02-24 @ 01:49)  magnesium sulfate Infusion: 25 (01-02-24 @ 01:27)  prenatal multivitamin: 1 (01-01-24 @ 12:07)  sodium chloride 0.65% Nasal: 1 (01-01-24 @ 16:28)  zinc sulfate: 220 (01-01-24 @ 12:08)      Labs:                         9.3    12.40 )-----------( 222      ( 01 Jan 2024 15:29 )             27.9   01-01    139  |  105  |  8<L>  ----------------------------<  81  3.8   |  23  |  0.5<L>    Ca    8.6      01 Jan 2024 15:29    TPro  5.6<L>  /  Alb  3.3<L>  /  TBili  <0.2  /  DBili  x   /  AST  16  /  ALT  13  /  AlkPhos  106  01-01      Urinalysis Basic - ( 01 Jan 2024 15:29 )    Color: x / Appearance: x / SG: x / pH: x  Gluc: 81 mg/dL / Ketone: x  / Bili: x / Urobili: x   Blood: x / Protein: x / Nitrite: x   Leuk Esterase: x / RBC: x / WBC x   Sq Epi: x / Non Sq Epi: x / Bacteria: x

## 2024-01-03 LAB
BASOPHILS # BLD AUTO: 0.04 K/UL — SIGNIFICANT CHANGE UP (ref 0–0.2)
BASOPHILS # BLD AUTO: 0.04 K/UL — SIGNIFICANT CHANGE UP (ref 0–0.2)
BASOPHILS # BLD AUTO: 0.06 K/UL — SIGNIFICANT CHANGE UP (ref 0–0.2)
BASOPHILS # BLD AUTO: 0.06 K/UL — SIGNIFICANT CHANGE UP (ref 0–0.2)
BASOPHILS NFR BLD AUTO: 0.2 % — SIGNIFICANT CHANGE UP (ref 0–1)
BASOPHILS NFR BLD AUTO: 0.2 % — SIGNIFICANT CHANGE UP (ref 0–1)
BASOPHILS NFR BLD AUTO: 0.4 % — SIGNIFICANT CHANGE UP (ref 0–1)
BASOPHILS NFR BLD AUTO: 0.4 % — SIGNIFICANT CHANGE UP (ref 0–1)
BLD GP AB SCN SERPL QL: SIGNIFICANT CHANGE UP
BLD GP AB SCN SERPL QL: SIGNIFICANT CHANGE UP
EOSINOPHIL # BLD AUTO: 0.22 K/UL — SIGNIFICANT CHANGE UP (ref 0–0.7)
EOSINOPHIL # BLD AUTO: 0.22 K/UL — SIGNIFICANT CHANGE UP (ref 0–0.7)
EOSINOPHIL # BLD AUTO: 0.31 K/UL — SIGNIFICANT CHANGE UP (ref 0–0.7)
EOSINOPHIL # BLD AUTO: 0.31 K/UL — SIGNIFICANT CHANGE UP (ref 0–0.7)
EOSINOPHIL NFR BLD AUTO: 1.4 % — SIGNIFICANT CHANGE UP (ref 0–8)
EOSINOPHIL NFR BLD AUTO: 1.4 % — SIGNIFICANT CHANGE UP (ref 0–8)
EOSINOPHIL NFR BLD AUTO: 2.1 % — SIGNIFICANT CHANGE UP (ref 0–8)
EOSINOPHIL NFR BLD AUTO: 2.1 % — SIGNIFICANT CHANGE UP (ref 0–8)
HCT VFR BLD CALC: 29.4 % — LOW (ref 37–47)
HCT VFR BLD CALC: 29.4 % — LOW (ref 37–47)
HCT VFR BLD CALC: 29.5 % — LOW (ref 37–47)
HCT VFR BLD CALC: 29.5 % — LOW (ref 37–47)
HGB BLD-MCNC: 9.8 G/DL — LOW (ref 12–16)
IMM GRANULOCYTES NFR BLD AUTO: 2 % — HIGH (ref 0.1–0.3)
IMM GRANULOCYTES NFR BLD AUTO: 2 % — HIGH (ref 0.1–0.3)
IMM GRANULOCYTES NFR BLD AUTO: 2.1 % — HIGH (ref 0.1–0.3)
IMM GRANULOCYTES NFR BLD AUTO: 2.1 % — HIGH (ref 0.1–0.3)
LYMPHOCYTES # BLD AUTO: 1.92 K/UL — SIGNIFICANT CHANGE UP (ref 1.2–3.4)
LYMPHOCYTES # BLD AUTO: 1.92 K/UL — SIGNIFICANT CHANGE UP (ref 1.2–3.4)
LYMPHOCYTES # BLD AUTO: 11.8 % — LOW (ref 20.5–51.1)
LYMPHOCYTES # BLD AUTO: 11.8 % — LOW (ref 20.5–51.1)
LYMPHOCYTES # BLD AUTO: 13.8 % — LOW (ref 20.5–51.1)
LYMPHOCYTES # BLD AUTO: 13.8 % — LOW (ref 20.5–51.1)
LYMPHOCYTES # BLD AUTO: 2.02 K/UL — SIGNIFICANT CHANGE UP (ref 1.2–3.4)
LYMPHOCYTES # BLD AUTO: 2.02 K/UL — SIGNIFICANT CHANGE UP (ref 1.2–3.4)
MCHC RBC-ENTMCNC: 28 PG — SIGNIFICANT CHANGE UP (ref 27–31)
MCHC RBC-ENTMCNC: 28 PG — SIGNIFICANT CHANGE UP (ref 27–31)
MCHC RBC-ENTMCNC: 28.4 PG — SIGNIFICANT CHANGE UP (ref 27–31)
MCHC RBC-ENTMCNC: 28.4 PG — SIGNIFICANT CHANGE UP (ref 27–31)
MCHC RBC-ENTMCNC: 33.2 G/DL — SIGNIFICANT CHANGE UP (ref 32–37)
MCHC RBC-ENTMCNC: 33.2 G/DL — SIGNIFICANT CHANGE UP (ref 32–37)
MCHC RBC-ENTMCNC: 33.3 G/DL — SIGNIFICANT CHANGE UP (ref 32–37)
MCHC RBC-ENTMCNC: 33.3 G/DL — SIGNIFICANT CHANGE UP (ref 32–37)
MCV RBC AUTO: 84.3 FL — SIGNIFICANT CHANGE UP (ref 81–99)
MCV RBC AUTO: 84.3 FL — SIGNIFICANT CHANGE UP (ref 81–99)
MCV RBC AUTO: 85.2 FL — SIGNIFICANT CHANGE UP (ref 81–99)
MCV RBC AUTO: 85.2 FL — SIGNIFICANT CHANGE UP (ref 81–99)
MONOCYTES # BLD AUTO: 0.74 K/UL — HIGH (ref 0.1–0.6)
MONOCYTES # BLD AUTO: 0.74 K/UL — HIGH (ref 0.1–0.6)
MONOCYTES # BLD AUTO: 0.91 K/UL — HIGH (ref 0.1–0.6)
MONOCYTES # BLD AUTO: 0.91 K/UL — HIGH (ref 0.1–0.6)
MONOCYTES NFR BLD AUTO: 4.6 % — SIGNIFICANT CHANGE UP (ref 1.7–9.3)
MONOCYTES NFR BLD AUTO: 4.6 % — SIGNIFICANT CHANGE UP (ref 1.7–9.3)
MONOCYTES NFR BLD AUTO: 6.2 % — SIGNIFICANT CHANGE UP (ref 1.7–9.3)
MONOCYTES NFR BLD AUTO: 6.2 % — SIGNIFICANT CHANGE UP (ref 1.7–9.3)
NEUTROPHILS # BLD AUTO: 11.07 K/UL — HIGH (ref 1.4–6.5)
NEUTROPHILS # BLD AUTO: 11.07 K/UL — HIGH (ref 1.4–6.5)
NEUTROPHILS # BLD AUTO: 12.97 K/UL — HIGH (ref 1.4–6.5)
NEUTROPHILS # BLD AUTO: 12.97 K/UL — HIGH (ref 1.4–6.5)
NEUTROPHILS NFR BLD AUTO: 75.4 % — HIGH (ref 42.2–75.2)
NEUTROPHILS NFR BLD AUTO: 75.4 % — HIGH (ref 42.2–75.2)
NEUTROPHILS NFR BLD AUTO: 80 % — HIGH (ref 42.2–75.2)
NEUTROPHILS NFR BLD AUTO: 80 % — HIGH (ref 42.2–75.2)
NRBC # BLD: 0 /100 WBCS — SIGNIFICANT CHANGE UP (ref 0–0)
PLATELET # BLD AUTO: 247 K/UL — SIGNIFICANT CHANGE UP (ref 130–400)
PLATELET # BLD AUTO: 247 K/UL — SIGNIFICANT CHANGE UP (ref 130–400)
PLATELET # BLD AUTO: 250 K/UL — SIGNIFICANT CHANGE UP (ref 130–400)
PLATELET # BLD AUTO: 250 K/UL — SIGNIFICANT CHANGE UP (ref 130–400)
PMV BLD: 10.5 FL — HIGH (ref 7.4–10.4)
PMV BLD: 10.5 FL — HIGH (ref 7.4–10.4)
PMV BLD: 10.7 FL — HIGH (ref 7.4–10.4)
PMV BLD: 10.7 FL — HIGH (ref 7.4–10.4)
RBC # BLD: 3.45 M/UL — LOW (ref 4.2–5.4)
RBC # BLD: 3.45 M/UL — LOW (ref 4.2–5.4)
RBC # BLD: 3.5 M/UL — LOW (ref 4.2–5.4)
RBC # BLD: 3.5 M/UL — LOW (ref 4.2–5.4)
RBC # FLD: 13.8 % — SIGNIFICANT CHANGE UP (ref 11.5–14.5)
RBC # FLD: 13.8 % — SIGNIFICANT CHANGE UP (ref 11.5–14.5)
RBC # FLD: 14 % — SIGNIFICANT CHANGE UP (ref 11.5–14.5)
RBC # FLD: 14 % — SIGNIFICANT CHANGE UP (ref 11.5–14.5)
WBC # BLD: 14.68 K/UL — HIGH (ref 4.8–10.8)
WBC # BLD: 14.68 K/UL — HIGH (ref 4.8–10.8)
WBC # BLD: 16.21 K/UL — HIGH (ref 4.8–10.8)
WBC # BLD: 16.21 K/UL — HIGH (ref 4.8–10.8)
WBC # FLD AUTO: 14.68 K/UL — HIGH (ref 4.8–10.8)
WBC # FLD AUTO: 14.68 K/UL — HIGH (ref 4.8–10.8)
WBC # FLD AUTO: 16.21 K/UL — HIGH (ref 4.8–10.8)
WBC # FLD AUTO: 16.21 K/UL — HIGH (ref 4.8–10.8)

## 2024-01-03 RX ORDER — IBUPROFEN 200 MG
600 TABLET ORAL EVERY 6 HOURS
Refills: 0 | Status: DISCONTINUED | OUTPATIENT
Start: 2024-01-03 | End: 2024-01-06

## 2024-01-03 RX ADMIN — Medication 975 MILLIGRAM(S): at 22:00

## 2024-01-03 RX ADMIN — Medication 325 MILLIGRAM(S): at 12:30

## 2024-01-03 RX ADMIN — ZINC SULFATE TAB 220 MG (50 MG ZINC EQUIVALENT) 220 MILLIGRAM(S): 220 (50 ZN) TAB at 12:27

## 2024-01-03 RX ADMIN — Medication 1 MILLIGRAM(S): at 12:27

## 2024-01-03 RX ADMIN — Medication 204 MILLIGRAM(S): at 01:03

## 2024-01-03 RX ADMIN — Medication 975 MILLIGRAM(S): at 16:22

## 2024-01-03 RX ADMIN — Medication 975 MILLIGRAM(S): at 11:07

## 2024-01-03 RX ADMIN — Medication 30 MILLIGRAM(S): at 00:07

## 2024-01-03 RX ADMIN — Medication 975 MILLIGRAM(S): at 04:51

## 2024-01-03 RX ADMIN — Medication 30 MILLIGRAM(S): at 06:08

## 2024-01-03 RX ADMIN — Medication 200 GRAM(S): at 04:39

## 2024-01-03 RX ADMIN — Medication 100 MILLIGRAM(S): at 10:40

## 2024-01-03 RX ADMIN — Medication 100 MILLIGRAM(S): at 01:49

## 2024-01-03 RX ADMIN — Medication 600 MILLIGRAM(S): at 18:56

## 2024-01-03 RX ADMIN — Medication 600 MILLIGRAM(S): at 14:04

## 2024-01-03 RX ADMIN — Medication 975 MILLIGRAM(S): at 21:21

## 2024-01-03 RX ADMIN — Medication 975 MILLIGRAM(S): at 04:39

## 2024-01-03 RX ADMIN — Medication 600 MILLIGRAM(S): at 23:58

## 2024-01-03 RX ADMIN — Medication 30 MILLIGRAM(S): at 06:01

## 2024-01-03 RX ADMIN — Medication 500 MILLIGRAM(S): at 12:28

## 2024-01-03 RX ADMIN — Medication 600 MILLIGRAM(S): at 12:28

## 2024-01-03 RX ADMIN — SIMETHICONE 80 MILLIGRAM(S): 80 TABLET, CHEWABLE ORAL at 16:22

## 2024-01-03 NOTE — PROGRESS NOTE ADULT - ASSESSMENT
A/P: 29yo P3 S/P LTCS at 27.5 di/di twins for suspected chorio POD#0, s/p 1U intraop, recovering well.  - continue routine post op care  - encourage ambulation, PO hydration  - monitor vitals, bleeding   - simethicone/colace  - DVT prophylaxis: lovenox + scds  - pain mgmt prn   - f/u AM CBC + T&S  - lantigua removed will f/u TOV     A/P: 29yo P3 S/P LTCS at 27.5 di/di twins for suspected chorio POD#0, s/p 1U intraop, recovering well.  - continue routine post op care  - continue triple antibiotics until 24hrs pp  - encourage ambulation, PO hydration  - monitor vitals, bleeding   - simethicone/colace  - DVT prophylaxis: lovenox + scds  - pain mgmt prn   - f/u AM CBC + T&S  - lantigua removed will f/u TOV     A/P: 27yo P3 S/P LTCS at 27.5 di/di twins for suspected chorio POD#0, s/p 1U intraop, recovering well.  - continue routine post op care  - continue triple antibiotics until 24hrs pp  - encourage ambulation, PO hydration  - monitor vitals, bleeding   - simethicone/colace  - DVT prophylaxis: lovenox + scds  - pain mgmt prn   - f/u AM CBC + T&S  - lantigua removed will f/u TOV

## 2024-01-03 NOTE — PROGRESS NOTE ADULT - SUBJECTIVE AND OBJECTIVE BOX
NABOR KU  28y  Female    PGY4 Note:    Pt is POD#1. Pt is recovering well, no acute complaints. Pt denies heavy vaginal bleeding, pain well controlled on PO medication. Patient is ambulating, tolerating a regular diet. Patient has not yet passed flatus or voided. Bottle feeding.  Denies headache, chest pain, SOB, fever, chills, nausea, vomiting, extermity pain or swelling.       PAST MEDICAL & SURGICAL HISTORY:  No pertinent past medical history  H/O dilation and curettage    Physical Exam  Vital Signs Last 24 Hrs  T(C): 36.9 (03 Jan 2024 03:26), Max: 37 (02 Jan 2024 15:43)  T(F): 98.4 (03 Jan 2024 03:26), Max: 98.6 (02 Jan 2024 15:43)  HR: 59 (03 Jan 2024 03:26) (59 - 117)  BP: 106/53 (03 Jan 2024 03:26) (94/53 - 117/56)  RR: 18 (03 Jan 2024 03:26) (16 - 18)  SpO2: 93% (02 Jan 2024 15:31) (92% - 100%)      Gen: AAOx3, NAD  Heart: RRR, no M/R/G  Lungs: CTAB  Fundus: firm, below umbilicus, nontender   Wound: Pfannenstiel incision, steris in place c/d/i   Abd: Soft, appropriately tender near incision site, mildly distended, BS+  Lochia: minimal  Ext: No calf tenderness, no swelling    Labs:                        9.8    16.21 )-----------( 250      ( 02 Jan 2024 20:00 )             29.5                         9.2    14.58 )-----------( 226      ( 02 Jan 2024 04:50 )             28.7        MEDICATIONS  (STANDING):  acetaminophen     Tablet .. 975 milliGRAM(s) Oral <User Schedule>  ascorbic acid 500 milliGRAM(s) Oral daily  clindamycin IVPB 900 milliGRAM(s) IV Intermittent every 8 hours  diphtheria/tetanus/pertussis (acellular) Vaccine (Adacel) 0.5 milliLiter(s) IntraMuscular once  enoxaparin Injectable 40 milliGRAM(s) SubCutaneous every 24 hours  ferrous    sulfate 325 milliGRAM(s) Oral daily  folic acid 1 milliGRAM(s) Oral daily  ibuprofen  Tablet. 600 milliGRAM(s) Oral every 6 hours  ketorolac   Injectable 30 milliGRAM(s) IV Push every 6 hours  lactated ringers. 1000 milliLiter(s) (125 mL/Hr) IV Continuous <Continuous>  oxytocin Infusion 333.333 milliUNIT(s)/Min (1000 mL/Hr) IV Continuous <Continuous>  zinc sulfate 220 milliGRAM(s) Oral daily    MEDICATIONS  (PRN):  diphenhydrAMINE 25 milliGRAM(s) Oral every 6 hours PRN Pruritus  lanolin Ointment 1 Application(s) Topical every 6 hours PRN Sore Nipples  magnesium hydroxide Suspension 30 milliLiter(s) Oral two times a day PRN Constipation  oxyCODONE    IR 5 milliGRAM(s) Oral once PRN Moderate to Severe Pain (4-10)  oxyCODONE    IR 5 milliGRAM(s) Oral every 3 hours PRN Moderate to Severe Pain (4-10)  simethicone 80 milliGRAM(s) Chew every 4 hours PRN Gas   NABOR KU  28y  Female    PGY4 Note:    Pt is POD#1. Pt is recovering well, no acute complaints. Pt denies heavy vaginal bleeding, pain well controlled on PO medication. Patient is ambulating, tolerating a regular diet, passing flatus. Patient has not yet voided. Bottle feeding.  Denies headache, chest pain, SOB, fever, chills, nausea, vomiting, extremity pain or swelling.       PAST MEDICAL & SURGICAL HISTORY:  No pertinent past medical history  H/O dilation and curettage    Physical Exam  Vital Signs Last 24 Hrs  T(C): 36.9 (03 Jan 2024 03:26), Max: 37 (02 Jan 2024 15:43)  T(F): 98.4 (03 Jan 2024 03:26), Max: 98.6 (02 Jan 2024 15:43)  HR: 59 (03 Jan 2024 03:26) (59 - 117)  BP: 106/53 (03 Jan 2024 03:26) (94/53 - 117/56)  RR: 18 (03 Jan 2024 03:26) (16 - 18)  SpO2: 93% (02 Jan 2024 15:31) (92% - 100%)      Gen: AAOx3, NAD  Heart: RRR, no M/R/G  Lungs: CTAB  Fundus: firm, below umbilicus, nontender   Wound: Pfannenstiel incision, steris in place c/d/i   Abd: Soft, appropriately tender near incision site, mildly distended, BS+  Lochia: minimal  Ext: No calf tenderness, no swelling    Labs:                        9.8    16.21 )-----------( 250      ( 02 Jan 2024 20:00 )             29.5                         9.2    14.58 )-----------( 226      ( 02 Jan 2024 04:50 )             28.7        MEDICATIONS  (STANDING):  acetaminophen     Tablet .. 975 milliGRAM(s) Oral <User Schedule>  ascorbic acid 500 milliGRAM(s) Oral daily  clindamycin IVPB 900 milliGRAM(s) IV Intermittent every 8 hours  diphtheria/tetanus/pertussis (acellular) Vaccine (Adacel) 0.5 milliLiter(s) IntraMuscular once  enoxaparin Injectable 40 milliGRAM(s) SubCutaneous every 24 hours  ferrous    sulfate 325 milliGRAM(s) Oral daily  folic acid 1 milliGRAM(s) Oral daily  ibuprofen  Tablet. 600 milliGRAM(s) Oral every 6 hours  ketorolac   Injectable 30 milliGRAM(s) IV Push every 6 hours  lactated ringers. 1000 milliLiter(s) (125 mL/Hr) IV Continuous <Continuous>  oxytocin Infusion 333.333 milliUNIT(s)/Min (1000 mL/Hr) IV Continuous <Continuous>  zinc sulfate 220 milliGRAM(s) Oral daily    MEDICATIONS  (PRN):  diphenhydrAMINE 25 milliGRAM(s) Oral every 6 hours PRN Pruritus  lanolin Ointment 1 Application(s) Topical every 6 hours PRN Sore Nipples  magnesium hydroxide Suspension 30 milliLiter(s) Oral two times a day PRN Constipation  oxyCODONE    IR 5 milliGRAM(s) Oral once PRN Moderate to Severe Pain (4-10)  oxyCODONE    IR 5 milliGRAM(s) Oral every 3 hours PRN Moderate to Severe Pain (4-10)  simethicone 80 milliGRAM(s) Chew every 4 hours PRN Gas

## 2024-01-04 ENCOUNTER — NON-APPOINTMENT (OUTPATIENT)
Age: 29
End: 2024-01-04

## 2024-01-04 ENCOUNTER — TRANSCRIPTION ENCOUNTER (OUTPATIENT)
Age: 29
End: 2024-01-04

## 2024-01-04 LAB
BASOPHILS # BLD AUTO: 0.04 K/UL
BASOPHILS NFR BLD AUTO: 0.3 %
EOSINOPHIL # BLD AUTO: 0.23 K/UL
EOSINOPHIL NFR BLD AUTO: 1.6 %
HCT VFR BLD CALC: 34.2 %
HGB BLD-MCNC: 11.1 G/DL
IMM GRANULOCYTES NFR BLD AUTO: 1.6 %
LYMPHOCYTES # BLD AUTO: 1.95 K/UL
LYMPHOCYTES NFR BLD AUTO: 14 %
MAN DIFF?: NORMAL
MCHC RBC-ENTMCNC: 28.4 PG
MCHC RBC-ENTMCNC: 32.5 G/DL
MCV RBC AUTO: 87.5 FL
MONOCYTES # BLD AUTO: 0.64 K/UL
MONOCYTES NFR BLD AUTO: 4.6 %
NEUTROPHILS # BLD AUTO: 10.86 K/UL
NEUTROPHILS NFR BLD AUTO: 77.9 %
PLATELET # BLD AUTO: 266 K/UL
RBC # BLD: 3.91 M/UL
RBC # FLD: 13.9 %
WBC # FLD AUTO: 13.94 K/UL

## 2024-01-04 RX ORDER — OXYCODONE HYDROCHLORIDE 5 MG/1
1 TABLET ORAL
Qty: 8 | Refills: 0
Start: 2024-01-04 | End: 2024-01-05

## 2024-01-04 RX ORDER — FERROUS SULFATE 325(65) MG
1 TABLET ORAL
Qty: 30 | Refills: 2
Start: 2024-01-04 | End: 2024-04-02

## 2024-01-04 RX ORDER — ACETAMINOPHEN 500 MG
3 TABLET ORAL
Qty: 168 | Refills: 0
Start: 2024-01-04 | End: 2024-01-17

## 2024-01-04 RX ORDER — GUAIFENESIN/DEXTROMETHORPHAN 600MG-30MG
10 TABLET, EXTENDED RELEASE 12 HR ORAL THREE TIMES A DAY
Refills: 0 | Status: DISCONTINUED | OUTPATIENT
Start: 2024-01-04 | End: 2024-01-06

## 2024-01-04 RX ORDER — IBUPROFEN 200 MG
1 TABLET ORAL
Qty: 56 | Refills: 0
Start: 2024-01-04 | End: 2024-01-17

## 2024-01-04 RX ADMIN — Medication 975 MILLIGRAM(S): at 20:36

## 2024-01-04 RX ADMIN — Medication 975 MILLIGRAM(S): at 20:58

## 2024-01-04 RX ADMIN — Medication 600 MILLIGRAM(S): at 11:44

## 2024-01-04 RX ADMIN — Medication 500 MILLIGRAM(S): at 11:45

## 2024-01-04 RX ADMIN — Medication 600 MILLIGRAM(S): at 17:18

## 2024-01-04 RX ADMIN — Medication 600 MILLIGRAM(S): at 18:03

## 2024-01-04 RX ADMIN — Medication 1 MILLIGRAM(S): at 11:45

## 2024-01-04 RX ADMIN — Medication 975 MILLIGRAM(S): at 04:15

## 2024-01-04 RX ADMIN — Medication 600 MILLIGRAM(S): at 23:43

## 2024-01-04 RX ADMIN — ENOXAPARIN SODIUM 40 MILLIGRAM(S): 100 INJECTION SUBCUTANEOUS at 23:43

## 2024-01-04 RX ADMIN — ZINC SULFATE TAB 220 MG (50 MG ZINC EQUIVALENT) 220 MILLIGRAM(S): 220 (50 ZN) TAB at 11:45

## 2024-01-04 RX ADMIN — Medication 975 MILLIGRAM(S): at 14:12

## 2024-01-04 RX ADMIN — Medication 975 MILLIGRAM(S): at 03:39

## 2024-01-04 RX ADMIN — Medication 600 MILLIGRAM(S): at 06:24

## 2024-01-04 RX ADMIN — Medication 975 MILLIGRAM(S): at 09:45

## 2024-01-04 RX ADMIN — ENOXAPARIN SODIUM 40 MILLIGRAM(S): 100 INJECTION SUBCUTANEOUS at 00:02

## 2024-01-04 RX ADMIN — Medication 975 MILLIGRAM(S): at 10:15

## 2024-01-04 RX ADMIN — Medication 600 MILLIGRAM(S): at 12:32

## 2024-01-04 RX ADMIN — Medication 975 MILLIGRAM(S): at 15:13

## 2024-01-04 RX ADMIN — Medication 325 MILLIGRAM(S): at 11:46

## 2024-01-04 RX ADMIN — Medication 600 MILLIGRAM(S): at 00:15

## 2024-01-04 NOTE — DISCHARGE NOTE OB - CARE PROVIDER_API CALL
Amrik Dobbins  Obstetrics and Gynecology  [address] [place] [id number]  Phone: [phone]  Fax: [phone]  Follow Up Time: 1 week   Amrik Dobbins  Obstetrics and Gynecology  [address] [place] [id number]  Phone: [phone]  Fax: [phone]  Follow Up Time: 1 week

## 2024-01-04 NOTE — DISCHARGE NOTE OB - MEDICATION SUMMARY - MEDICATIONS TO TAKE
I will START or STAY ON the medications listed below when I get home from the hospital:    ibuprofen 600 mg oral tablet  -- 1 tab(s) by mouth every 6 hours  -- Indication: For Pain    acetaminophen 325 mg oral tablet  -- 3 tab(s) by mouth every 6 hours  -- Indication: For Pain    ferrous sulfate 325 mg (65 mg elemental iron) oral tablet  -- 1 tab(s) by mouth once a day  -- Indication: For anemia

## 2024-01-04 NOTE — DISCHARGE NOTE OB - NS MD DC FALL RISK RISK
For information on Fall & Injury Prevention, visit: [email or web address] OR  [email or web address] OR  https://www.cdc.gov/steadi/patient.html For information on Fall & Injury Prevention, visit: [email or web address] OR  [email or web address] OR  https://www.cdc.gov/steadi/patient.html

## 2024-01-04 NOTE — PROGRESS NOTE ADULT - ASSESSMENT
A/P: 29yo P3 S/P LTCS at 27.5 di/di twins for suspected chorio POD#2, s/p 1U intraop, recovering well.  - continue routine post op care  - continue triple antibiotics until 24hrs pp  - encourage ambulation, PO hydration  - monitor vitals, bleeding   - simethicone/colace  - DVT prophylaxis: lovenox + scds  - pain mgmt prn   - anticipated d/c home today      Dr. Dobbins to be made aware

## 2024-01-04 NOTE — DISCHARGE NOTE OB - PROVIDER TOKENS
PROVIDER:[TOKEN:[[id number]],FOLLOWUP:[1 week]] PROVIDER:[TOKEN:[[id number]],FOLLOWUP:[1 week]]

## 2024-01-04 NOTE — DISCHARGE NOTE OB - HOSPITAL COURSE
DATE OF DISCHARGE: 24 @ 06:03    HISTORY OF PRESENT ILLNESS/HOSPITAL COURSE: HPI:  27yo  at 27w0d YASMIN 24 with monochorionic/diamniotic twin pregnancy, presents for leakage of clear fluid at 2100. Denies contractions or vaginal bleeding. Endorses good fetal movement of both fetuses. Pregnancy complicated by  twin to twin transfusion in second trimester s/p laser surgery at [facility] on 10/18. Also has elevated GCT, GTT not done yet. Denies fevers/chills, nausea/vomiting, chest pain, SOB, or urinary symptoms. Last PO: 6pm. Last BM: this morning. Last intercourse: prior to pregnancy.    (28 Dec 2023 02:34)    PAST MEDICAL & SURGICAL HISTORY:  No pertinent past medical history      H/O dilation and curettage          PROCEDURES PERFORMED:  section  COMPLICATIONS:  -----   POST PARTUM COURSE: uncomplicated, discharged home on PPD2  FINAL DIAGNOSIS:  Status post  section at 27 weeks    DISCHARGE CBC:                       9.8    14.68 )-----------( 247      ( 2024 07:21 )             29.4     DISCHARGE INSTRUCTIONS:  If you have severe abdominal pain, heavy vaginal bleeding, shortness of breath or chest pain please call your doctor or come to the emergency room.   DIET:  Advance as tolerated.  ACTIVITY:  Advance as tolerated.  Pelvic rest for 6 weeks.  Nothing to be inserted into the vagina for 6 weeks, i.e. no tampons, douching, sexual relations, or tub baths.   FOLLOW UP: Make an appointment to see your doctor as instructed   PRESCRIPTIONS: Prescriptions:  acetaminophen 325 mg oral tablet: 3 tab(s) orally every 6 hours (2024 06:03)  ferrous sulfate 325 mg (65 mg elemental iron) oral tablet: 1 tab(s) orally once a day (2024 06:03)  ibuprofen 600 mg oral tablet: 1 tab(s) orally every 6 hours (2024 06:03)         DATE OF DISCHARGE: 24 @ 06:03    HISTORY OF PRESENT ILLNESS/HOSPITAL COURSE: HPI:  29yo  at 27w0d YASMIN 24 with monochorionic/diamniotic twin pregnancy, presents for leakage of clear fluid at 2100. Denies contractions or vaginal bleeding. Endorses good fetal movement of both fetuses. Pregnancy complicated by  twin to twin transfusion in second trimester s/p laser surgery at [facility] on 10/18. Also has elevated GCT, GTT not done yet. Denies fevers/chills, nausea/vomiting, chest pain, SOB, or urinary symptoms. Last PO: 6pm. Last BM: this morning. Last intercourse: prior to pregnancy.    (28 Dec 2023 02:34)    PAST MEDICAL & SURGICAL HISTORY:  No pertinent past medical history      H/O dilation and curettage          PROCEDURES PERFORMED:  section  COMPLICATIONS:  -----   POST PARTUM COURSE: uncomplicated, discharged home on PPD2  FINAL DIAGNOSIS:  Status post  section at 27 weeks    DISCHARGE CBC:                       9.8    14.68 )-----------( 247      ( 2024 07:21 )             29.4     DISCHARGE INSTRUCTIONS:  If you have severe abdominal pain, heavy vaginal bleeding, shortness of breath or chest pain please call your doctor or come to the emergency room.   DIET:  Advance as tolerated.  ACTIVITY:  Advance as tolerated.  Pelvic rest for 6 weeks.  Nothing to be inserted into the vagina for 6 weeks, i.e. no tampons, douching, sexual relations, or tub baths.   FOLLOW UP: Make an appointment to see your doctor as instructed   PRESCRIPTIONS: Prescriptions:  acetaminophen 325 mg oral tablet: 3 tab(s) orally every 6 hours (2024 06:03)  ferrous sulfate 325 mg (65 mg elemental iron) oral tablet: 1 tab(s) orally once a day (2024 06:03)  ibuprofen 600 mg oral tablet: 1 tab(s) orally every 6 hours (2024 06:03)

## 2024-01-04 NOTE — ANESTHESIA FOLLOW-UP NOTE - NSEVALATION_GEN_ALL_CORE
[facility]  Metabolic Weight Loss Program        Patient's Name: Jessica Colón  : 1984    This patient is enrolled in [address] [place] [address] Weight Loss Program and attended the required weekly virtual nutrition class hosted via [employer].       Luis Castaneda, MS, RD, LDN
No apparent complications or complaints regarding anesthesia care at this time/All questions were answered

## 2024-01-04 NOTE — DISCHARGE NOTE OB - CRACKED, BLEEDING NIPPLES
SUBJECTIVE:  Patient presents self to clinic complaining of pain fourth interspace left foot with lesions on both sides. He tries to debride them out himself but cannot do so anymore. They cause pain even is walking shoes.    OBJECTIVE:  Patient presents sitting comfortably in chair no acute distress. He presents with slight keratoma which he has been actually quite successful debriding himself. He is using a knife and in reducing a corn on the outside of the fifth toe has cut himself but that is healed up now. He presents with a mallet toe fifth digit left foot and it under laps the fourth digit causing the pressure problem.    ASSESSMENT:  Painful mallet toe with fifth toe under lapping fourth toe left foot and forming painful keratoma.    PLAN:  Patient to use a rough course [place] board and file callus weekly on both sides and not use a knife. He is to use toe spacers between fourth and fifth toe and to confirm that the toebox provides adequate width all the way distal through the toe area. He will monitor return to see me p.r.n.       Statement Selected

## 2024-01-04 NOTE — DISCHARGE NOTE OB - MEDICATION SUMMARY - MEDICATIONS TO STOP TAKING
I will STOP taking the medications listed below when I get home from the hospital:    Macrobid 100 mg oral capsule  -- 1 cap(s) by mouth 2 times a day

## 2024-01-04 NOTE — DISCHARGE NOTE OB - PATIENT PORTAL LINK FT
You can access the FollowMyHealth Patient Portal offered by [facility] by registering at the following website: [email or web address]. By joining [employer]’s FollowMyHealth portal, you will also be able to view your health information using other applications (apps) compatible with our system. You can access the FollowMyHealth Patient Portal offered by [facility] by registering at the following website: [email or web address]. By joining [employer]’s FollowMyHealth portal, you will also be able to view your health information using other applications (apps) compatible with our system.

## 2024-01-04 NOTE — PROGRESS NOTE ADULT - SUBJECTIVE AND OBJECTIVE BOX
NABOR CEKU  28y  Female    PGY4 Note:    Pt is POD#2. Pt is recovering well, no acute complaints. Pt denies heavy vaginal bleeding, pain well controlled on PO medication. Patient is ambulating, tolerating a regular diet, voiding, passing flatus. Breast feeding.  Denies headache, chest pain, SOB, fever, chills, nausea, vomiting, extermity pain or swelling.     PAST MEDICAL & SURGICAL HISTORY:  No pertinent past medical history  H/O dilation and curettage    Physical Exam  Vital Signs Last 24 Hrs  T(C): 37.1 (03 Jan 2024 23:35), Max: 37.2 (03 Jan 2024 19:36)  T(F): 98.7 (03 Jan 2024 23:35), Max: 99 (03 Jan 2024 19:36)  HR: 71 (03 Jan 2024 23:35) (65 - 94)  BP: 98/50 (03 Jan 2024 23:35) (98/50 - 113/67)  RR: 18 (03 Jan 2024 23:35) (18 - 18)    Gen: AAOx3, NAD  Heart: RRR, no M/R/G  Lungs: CTAB  Fundus: firm, below umbilicus, nontender   Wound: Pfannenstiel incision, steris in place c/d/i   Abd: Soft, appropriately tender near incision site, mildly distended, BS+  Lochia: minimal  Ext: No calf tenderness, no swelling    Labs:                        9.8    14.68 )-----------( 247      ( 03 Jan 2024 07:21 )             29.4                         9.8    16.21 )-----------( 250      ( 02 Jan 2024 20:00 )             29.5        MEDICATIONS  (STANDING):  acetaminophen     Tablet .. 975 milliGRAM(s) Oral <User Schedule>  ascorbic acid 500 milliGRAM(s) Oral daily  diphtheria/tetanus/pertussis (acellular) Vaccine (Adacel) 0.5 milliLiter(s) IntraMuscular once  enoxaparin Injectable 40 milliGRAM(s) SubCutaneous every 24 hours  ferrous    sulfate 325 milliGRAM(s) Oral daily  folic acid 1 milliGRAM(s) Oral daily  ibuprofen  Tablet. 600 milliGRAM(s) Oral every 6 hours  lactated ringers. 1000 milliLiter(s) (125 mL/Hr) IV Continuous <Continuous>  oxytocin Infusion 333.333 milliUNIT(s)/Min (1000 mL/Hr) IV Continuous <Continuous>  zinc sulfate 220 milliGRAM(s) Oral daily    MEDICATIONS  (PRN):  diphenhydrAMINE 25 milliGRAM(s) Oral every 6 hours PRN Pruritus  guaifenesin/dextromethorphan Oral Liquid 10 milliLiter(s) Oral three times a day PRN Cough  lanolin Ointment 1 Application(s) Topical every 6 hours PRN Sore Nipples  magnesium hydroxide Suspension 30 milliLiter(s) Oral two times a day PRN Constipation  oxyCODONE    IR 5 milliGRAM(s) Oral once PRN Moderate to Severe Pain (4-10)  oxyCODONE    IR 5 milliGRAM(s) Oral every 3 hours PRN Moderate to Severe Pain (4-10)  simethicone 80 milliGRAM(s) Chew every 4 hours PRN Gas

## 2024-01-05 RX ADMIN — Medication 500 MILLIGRAM(S): at 11:46

## 2024-01-05 RX ADMIN — Medication 325 MILLIGRAM(S): at 11:52

## 2024-01-05 RX ADMIN — Medication 600 MILLIGRAM(S): at 17:37

## 2024-01-05 RX ADMIN — Medication 975 MILLIGRAM(S): at 22:09

## 2024-01-05 RX ADMIN — Medication 975 MILLIGRAM(S): at 04:17

## 2024-01-05 RX ADMIN — Medication 600 MILLIGRAM(S): at 05:53

## 2024-01-05 RX ADMIN — Medication 1 MILLIGRAM(S): at 11:46

## 2024-01-05 RX ADMIN — Medication 975 MILLIGRAM(S): at 23:01

## 2024-01-05 RX ADMIN — Medication 975 MILLIGRAM(S): at 15:58

## 2024-01-05 RX ADMIN — Medication 600 MILLIGRAM(S): at 00:34

## 2024-01-05 RX ADMIN — ZINC SULFATE TAB 220 MG (50 MG ZINC EQUIVALENT) 220 MILLIGRAM(S): 220 (50 ZN) TAB at 11:46

## 2024-01-05 RX ADMIN — Medication 975 MILLIGRAM(S): at 09:12

## 2024-01-05 RX ADMIN — Medication 975 MILLIGRAM(S): at 03:42

## 2024-01-05 RX ADMIN — Medication 600 MILLIGRAM(S): at 06:38

## 2024-01-05 RX ADMIN — Medication 600 MILLIGRAM(S): at 11:47

## 2024-01-05 NOTE — PROGRESS NOTE ADULT - SUBJECTIVE AND OBJECTIVE BOX
NABOR CEKU  28y  Female    PGY4 Note:    Pt is POD#3. Pt is recovering well, no acute complaints. Pt denies heavy vaginal bleeding, pain well controlled on PO medication. Patient is ambulating, tolerating a regular diet, voiding, passing flatus. Breast feeding.  Denies headache, chest pain, SOB, fever, chills, nausea, vomiting, extermity pain or swelling.     PAST MEDICAL & SURGICAL HISTORY:  No pertinent past medical history  H/O dilation and curettage      Physical Exam  Vital Signs Last 24 Hrs  T(C): 36.9 (05 Jan 2024 03:30), Max: 37 (04 Jan 2024 19:11)  T(F): 98.5 (05 Jan 2024 03:30), Max: 98.6 (04 Jan 2024 19:11)  HR: 79 (05 Jan 2024 03:30) (78 - 99)  BP: 111/57 (05 Jan 2024 03:30) (100/62 - 115/83)  RR: 18 (05 Jan 2024 03:30) (16 - 18)    Gen: AAOx3, NAD  Heart: RRR, no M/R/G  Lungs: CTAB  Fundus: firm, below umbilicus, nontender   Wound: Pfannenstiel incision, steris in place c/d/i   Abd: Soft, appropriately tender near incision site, mildly distended, BS+  Lochia: minimal  Ext: No calf tenderness, no swelling    Labs:                        9.8    14.68 )-----------( 247      ( 03 Jan 2024 07:21 )             29.4                         9.8    16.21 )-----------( 250      ( 02 Jan 2024 20:00 )             29.5      MEDICATIONS  (STANDING):  acetaminophen     Tablet .. 975 milliGRAM(s) Oral <User Schedule>  ascorbic acid 500 milliGRAM(s) Oral daily  diphtheria/tetanus/pertussis (acellular) Vaccine (Adacel) 0.5 milliLiter(s) IntraMuscular once  enoxaparin Injectable 40 milliGRAM(s) SubCutaneous every 24 hours  ferrous    sulfate 325 milliGRAM(s) Oral daily  folic acid 1 milliGRAM(s) Oral daily  ibuprofen  Tablet. 600 milliGRAM(s) Oral every 6 hours  lactated ringers. 1000 milliLiter(s) (125 mL/Hr) IV Continuous <Continuous>  oxytocin Infusion 333.333 milliUNIT(s)/Min (1000 mL/Hr) IV Continuous <Continuous>  zinc sulfate 220 milliGRAM(s) Oral daily    MEDICATIONS  (PRN):  diphenhydrAMINE 25 milliGRAM(s) Oral every 6 hours PRN Pruritus  guaifenesin/dextromethorphan Oral Liquid 10 milliLiter(s) Oral three times a day PRN Cough  lanolin Ointment 1 Application(s) Topical every 6 hours PRN Sore Nipples  magnesium hydroxide Suspension 30 milliLiter(s) Oral two times a day PRN Constipation  oxyCODONE    IR 5 milliGRAM(s) Oral every 3 hours PRN Moderate to Severe Pain (4-10)  oxyCODONE    IR 5 milliGRAM(s) Oral once PRN Moderate to Severe Pain (4-10)  simethicone 80 milliGRAM(s) Chew every 4 hours PRN Gas

## 2024-01-05 NOTE — PROGRESS NOTE ADULT - ASSESSMENT
A/P: 27yo P3 S/P LTCS at 27.5 di/di twins for suspected chorio POD#3, s/p 1U intraop, recovering well.  - continue routine post op care  - continue triple antibiotics until 24hrs pp  - encourage ambulation, PO hydration  - monitor vitals, bleeding   - simethicone/colace  - DVT prophylaxis: lovenox + scds  - pain mgmt prn   - anticipated d/c home today      Dr. Dobbins to be made aware   A/P: 29yo P3 S/P LTCS at 27.5 di/di twins for suspected chorio POD#3, s/p 1U intraop, recovering well.  - continue routine post op care  - continue triple antibiotics until 24hrs pp  - encourage ambulation, PO hydration  - monitor vitals, bleeding   - simethicone/colace  - DVT prophylaxis: lovenox + scds  - pain mgmt prn   - anticipated d/c home today      Dr. Dobbins to be made aware

## 2024-01-06 VITALS — TEMPERATURE: 98 F | SYSTOLIC BLOOD PRESSURE: 110 MMHG | DIASTOLIC BLOOD PRESSURE: 60 MMHG | HEART RATE: 84 BPM

## 2024-01-06 RX ADMIN — Medication 600 MILLIGRAM(S): at 00:46

## 2024-01-06 RX ADMIN — Medication 325 MILLIGRAM(S): at 11:48

## 2024-01-06 RX ADMIN — Medication 1 MILLIGRAM(S): at 11:47

## 2024-01-06 RX ADMIN — Medication 500 MILLIGRAM(S): at 11:47

## 2024-01-06 RX ADMIN — Medication 600 MILLIGRAM(S): at 12:40

## 2024-01-06 RX ADMIN — Medication 600 MILLIGRAM(S): at 05:30

## 2024-01-06 RX ADMIN — ENOXAPARIN SODIUM 40 MILLIGRAM(S): 100 INJECTION SUBCUTANEOUS at 00:16

## 2024-01-06 RX ADMIN — Medication 975 MILLIGRAM(S): at 15:20

## 2024-01-06 RX ADMIN — Medication 975 MILLIGRAM(S): at 10:15

## 2024-01-06 RX ADMIN — Medication 975 MILLIGRAM(S): at 15:50

## 2024-01-06 RX ADMIN — Medication 600 MILLIGRAM(S): at 18:43

## 2024-01-06 RX ADMIN — Medication 975 MILLIGRAM(S): at 09:46

## 2024-01-06 RX ADMIN — Medication 600 MILLIGRAM(S): at 11:48

## 2024-01-06 RX ADMIN — ZINC SULFATE TAB 220 MG (50 MG ZINC EQUIVALENT) 220 MILLIGRAM(S): 220 (50 ZN) TAB at 11:47

## 2024-01-06 RX ADMIN — Medication 600 MILLIGRAM(S): at 00:16

## 2024-01-06 RX ADMIN — Medication 600 MILLIGRAM(S): at 06:06

## 2024-01-06 NOTE — PROGRESS NOTE ADULT - ASSESSMENT
A/P: 27yo P3 S/P LTCS at 27.5 di/di twins for suspected chorio POD#4, s/p 1U intraop, recovering well.  - continue routine post op care  - continue triple antibiotics until 24hrs pp  - encourage ambulation, PO hydration  - monitor vitals, bleeding   - simethicone/colace  - DVT prophylaxis: lovenox + scds  - pain mgmt prn   - anticipated d/c home today      Dr. Dobbins to be made aware

## 2024-01-06 NOTE — PROGRESS NOTE ADULT - ATTENDING COMMENTS
Patient seen and examined at bedside in [facility]. denies any complaints, desires to go home today. Reports passing gas and voiding freely and tolerating regular diet. She is pumping and has a breast pump at home. Patient seen and examined at bedside in [facility]. denies any complaints, desires to go home today. Reports passing gas and voiding freely and tolerating regular diet. She is pumping and has a breast pump at home.

## 2024-01-06 NOTE — PROGRESS NOTE ADULT - SUBJECTIVE AND OBJECTIVE BOX
NABOR JENNIFERU  28y  Female    PGY1 Note:    Patient seen and examined bedside. No overnight events. Denies heavy vaginal bleeding. Pain well controlled. Denies headaches, vision changes, dizziness, lightheadedness, CP, SOB, palpitations, RUQ pain. Denies fever, chills, nausea/vomiting. Ambulating without difficulty. Tolerating diet, voiding, passing flatus, no BM. Feels ready to go home today.     Physical Exam  Vital Signs Last 24 Hrs  T(C): 36.8 (06 Jan 2024 07:46), Max: 37.1 (05 Jan 2024 15:18)  T(F): 98.2 (06 Jan 2024 07:46), Max: 98.8 (05 Jan 2024 15:18)  HR: 80 (06 Jan 2024 07:46) (65 - 84)  BP: 105/59 (06 Jan 2024 07:46) (100/55 - 128/54)  RR: 18 (06 Jan 2024 07:46) (18 - 18)      Gen: NAD, sitting comfortably  CV: RRR. No murmurs gallops or rubs.  Pulm: CTAB. No wheezes or rales.  Ext: No calf tenderness, no swelling.   Abd: Nondistended, soft, nontender, BS+, fundus firm, and below umbilicus.   Lochia: Minimal rubra  Wound: Dressing changed. Pfannenstiel skin incision clean, dry intact. Steris in place. No surrounding edema or erythema.        PAST MEDICAL & SURGICAL HISTORY:  No pertinent past medical history      H/O dilation and curettage          Diet:    Labs:                        9.8    14.68 )-----------( 247      ( 03 Jan 2024 07:21 )             29.4                         9.8    16.21 )-----------( 250      ( 02 Jan 2024 20:00 )             29.5          acetaminophen     Tablet .. 975 milliGRAM(s) Oral <User Schedule>  ascorbic acid 500 milliGRAM(s) Oral daily  diphenhydrAMINE 25 milliGRAM(s) Oral every 6 hours PRN  diphtheria/tetanus/pertussis (acellular) Vaccine (Adacel) 0.5 milliLiter(s) IntraMuscular once  enoxaparin Injectable 40 milliGRAM(s) SubCutaneous every 24 hours  ferrous    sulfate 325 milliGRAM(s) Oral daily  folic acid 1 milliGRAM(s) Oral daily  guaifenesin/dextromethorphan Oral Liquid 10 milliLiter(s) Oral three times a day PRN  ibuprofen  Tablet. 600 milliGRAM(s) Oral every 6 hours  lactated ringers. 1000 milliLiter(s) IV Continuous <Continuous>  lanolin Ointment 1 Application(s) Topical every 6 hours PRN  magnesium hydroxide Suspension 30 milliLiter(s) Oral two times a day PRN  oxyCODONE    IR 5 milliGRAM(s) Oral once PRN  oxyCODONE    IR 5 milliGRAM(s) Oral every 3 hours PRN  oxytocin Infusion 333.333 milliUNIT(s)/Min IV Continuous <Continuous>  simethicone 80 milliGRAM(s) Chew every 4 hours PRN  zinc sulfate 220 milliGRAM(s) Oral daily        NABOR JENNIFERU  28y  Female    PGY1 Note:    Patient seen and examined bedside. No overnight events. Denies heavy vaginal bleeding. Pain well controlled. Denies headaches, vision changes, dizziness, lightheadedness, CP, SOB, palpitations, RUQ pain. Denies fever, chills, nausea/vomiting. Ambulating without difficulty. Tolerating diet, voiding, passing flatus, no BM. Feels ready to go home today.     Physical Exam  Vital Signs Last 24 Hrs  T(C): 36.8 (06 Jan 2024 07:46), Max: 37.1 (05 Jan 2024 15:18)  T(F): 98.2 (06 Jan 2024 07:46), Max: 98.8 (05 Jan 2024 15:18)  HR: 80 (06 Jan 2024 07:46) (65 - 84)  BP: 105/59 (06 Jan 2024 07:46) (100/55 - 128/54)  RR: 18 (06 Jan 2024 07:46) (18 - 18)      Gen: NAD, sitting comfortably  CV: RRR. No murmurs gallops or rubs.  Pulm: CTAB. No wheezes or rales.  Ext: No calf tenderness, no swelling.   Abd: Nondistended, soft, nontender, BS+, fundus firm, and below umbilicus.   Lochia: Minimal rubra  Wound: Dressing changed. Pfannenstiel skin incision clean, dry intact. Steris in place. No surrounding edema or erythema.        PAST MEDICAL & SURGICAL HISTORY:  No pertinent past medical history      H/O dilation and curettage          Diet: regular    Labs:                        9.8    14.68 )-----------( 247      ( 03 Jan 2024 07:21 )             29.4                         9.8    16.21 )-----------( 250      ( 02 Jan 2024 20:00 )             29.5          acetaminophen     Tablet .. 975 milliGRAM(s) Oral <User Schedule>  ascorbic acid 500 milliGRAM(s) Oral daily  diphenhydrAMINE 25 milliGRAM(s) Oral every 6 hours PRN  diphtheria/tetanus/pertussis (acellular) Vaccine (Adacel) 0.5 milliLiter(s) IntraMuscular once  enoxaparin Injectable 40 milliGRAM(s) SubCutaneous every 24 hours  ferrous    sulfate 325 milliGRAM(s) Oral daily  folic acid 1 milliGRAM(s) Oral daily  guaifenesin/dextromethorphan Oral Liquid 10 milliLiter(s) Oral three times a day PRN  ibuprofen  Tablet. 600 milliGRAM(s) Oral every 6 hours  lactated ringers. 1000 milliLiter(s) IV Continuous <Continuous>  lanolin Ointment 1 Application(s) Topical every 6 hours PRN  magnesium hydroxide Suspension 30 milliLiter(s) Oral two times a day PRN  oxyCODONE    IR 5 milliGRAM(s) Oral once PRN  oxyCODONE    IR 5 milliGRAM(s) Oral every 3 hours PRN  oxytocin Infusion 333.333 milliUNIT(s)/Min IV Continuous <Continuous>  simethicone 80 milliGRAM(s) Chew every 4 hours PRN  zinc sulfate 220 milliGRAM(s) Oral daily

## 2024-01-08 PROBLEM — Z78.9 OTHER SPECIFIED HEALTH STATUS: Chronic | Status: ACTIVE | Noted: 2023-12-28

## 2024-01-09 ENCOUNTER — APPOINTMENT (OUTPATIENT)
Dept: ANTEPARTUM | Facility: CLINIC | Age: 29
End: 2024-01-09

## 2024-01-09 ENCOUNTER — APPOINTMENT (OUTPATIENT)
Dept: OBGYN | Facility: CLINIC | Age: 29
End: 2024-01-09
Payer: MEDICAID

## 2024-01-09 VITALS
TEMPERATURE: 97.9 F | DIASTOLIC BLOOD PRESSURE: 70 MMHG | WEIGHT: 196 LBS | BODY MASS INDEX: 29.7 KG/M2 | OXYGEN SATURATION: 98 % | SYSTOLIC BLOOD PRESSURE: 100 MMHG | HEART RATE: 91 BPM | HEIGHT: 68 IN

## 2024-01-09 DIAGNOSIS — Z13.0 ENCOUNTER FOR SCREENING FOR DISEASES OF THE BLOOD AND BLOOD-FORMING ORGANS AND CERTAIN DISORDERS INVOLVING THE IMMUNE MECHANISM: ICD-10-CM

## 2024-01-09 PROCEDURE — 99024 POSTOP FOLLOW-UP VISIT: CPT

## 2024-01-10 ENCOUNTER — APPOINTMENT (OUTPATIENT)
Dept: OBGYN | Facility: CLINIC | Age: 29
End: 2024-01-10

## 2024-01-10 DIAGNOSIS — Z87.891 PERSONAL HISTORY OF NICOTINE DEPENDENCE: ICD-10-CM

## 2024-01-10 DIAGNOSIS — O41.1220 CHORIOAMNIONITIS, SECOND TRIMESTER, NOT APPLICABLE OR UNSPECIFIED: ICD-10-CM

## 2024-01-10 DIAGNOSIS — E74.39 OTHER DISORDERS OF INTESTINAL CARBOHYDRATE ABSORPTION: ICD-10-CM

## 2024-01-10 DIAGNOSIS — O30.032 TWIN PREGNANCY, MONOCHORIONIC/DIAMNIOTIC, SECOND TRIMESTER: ICD-10-CM

## 2024-01-10 DIAGNOSIS — D50.0 IRON DEFICIENCY ANEMIA SECONDARY TO BLOOD LOSS (CHRONIC): ICD-10-CM

## 2024-01-10 DIAGNOSIS — O42.912 PRETERM PREMATURE RUPTURE OF MEMBRANES, UNSPECIFIED AS TO LENGTH OF TIME BETWEEN RUPTURE AND ONSET OF LABOR, SECOND TRIMESTER: ICD-10-CM

## 2024-01-10 DIAGNOSIS — Z3A.27 27 WEEKS GESTATION OF PREGNANCY: ICD-10-CM

## 2024-01-10 DIAGNOSIS — Z28.21 IMMUNIZATION NOT CARRIED OUT BECAUSE OF PATIENT REFUSAL: ICD-10-CM

## 2024-01-10 DIAGNOSIS — Z28.09 IMMUNIZATION NOT CARRIED OUT BECAUSE OF OTHER CONTRAINDICATION: ICD-10-CM

## 2024-01-10 DIAGNOSIS — O32.2XX2 MATERNAL CARE FOR TRANSVERSE AND OBLIQUE LIE, FETUS 2: ICD-10-CM

## 2024-01-10 LAB
SURGICAL PATHOLOGY STUDY: SIGNIFICANT CHANGE UP
SURGICAL PATHOLOGY STUDY: SIGNIFICANT CHANGE UP

## 2024-01-12 LAB
BASOPHILS # BLD AUTO: 0.06 K/UL
BASOPHILS NFR BLD AUTO: 0.9 %
EOSINOPHIL # BLD AUTO: 0.2 K/UL
EOSINOPHIL NFR BLD AUTO: 2.9 %
HCT VFR BLD CALC: 38.9 %
HGB BLD-MCNC: 12.6 G/DL
IMM GRANULOCYTES NFR BLD AUTO: 0.9 %
LYMPHOCYTES # BLD AUTO: 2.55 K/UL
LYMPHOCYTES NFR BLD AUTO: 36.4 %
MAN DIFF?: NORMAL
MCHC RBC-ENTMCNC: 28.3 PG
MCHC RBC-ENTMCNC: 32.4 G/DL
MCV RBC AUTO: 87.2 FL
MONOCYTES # BLD AUTO: 0.56 K/UL
MONOCYTES NFR BLD AUTO: 8 %
NEUTROPHILS # BLD AUTO: 3.57 K/UL
NEUTROPHILS NFR BLD AUTO: 50.9 %
PLATELET # BLD AUTO: 414 K/UL
RBC # BLD: 4.46 M/UL
RBC # FLD: 13.6 %
WBC # FLD AUTO: 7 K/UL

## 2024-01-16 ENCOUNTER — APPOINTMENT (OUTPATIENT)
Dept: ANTEPARTUM | Facility: CLINIC | Age: 29
End: 2024-01-16

## 2024-01-16 ENCOUNTER — APPOINTMENT (OUTPATIENT)
Dept: OBGYN | Facility: CLINIC | Age: 29
End: 2024-01-16
Payer: MEDICAID

## 2024-01-16 VITALS
TEMPERATURE: 98.7 F | WEIGHT: 195 LBS | HEIGHT: 68 IN | HEART RATE: 83 BPM | OXYGEN SATURATION: 93 % | DIASTOLIC BLOOD PRESSURE: 80 MMHG | BODY MASS INDEX: 29.55 KG/M2 | SYSTOLIC BLOOD PRESSURE: 120 MMHG

## 2024-01-16 DIAGNOSIS — R31.29 OTHER MICROSCOPIC HEMATURIA: ICD-10-CM

## 2024-01-16 LAB
BILIRUB UR QL STRIP: NORMAL
CLARITY UR: CLEAR
COLLECTION METHOD: NORMAL
GLUCOSE UR-MCNC: NORMAL
HCG UR QL: 0.2 EU/DL
HGB UR QL STRIP.AUTO: NORMAL
KETONES UR-MCNC: NORMAL
LEUKOCYTE ESTERASE UR QL STRIP: NORMAL
NITRITE UR QL STRIP: NORMAL
PH UR STRIP: 6
PROT UR STRIP-MCNC: NORMAL
SP GR UR STRIP: 1.02

## 2024-01-16 PROCEDURE — 99024 POSTOP FOLLOW-UP VISIT: CPT

## 2024-01-16 RX ORDER — CEPHALEXIN 500 MG/1
500 CAPSULE ORAL
Qty: 9 | Refills: 0 | Status: ACTIVE | COMMUNITY
Start: 2024-01-16 | End: 1900-01-01

## 2024-01-16 NOTE — HISTORY OF PRESENT ILLNESS
[Postpartum Follow Up] : postpartum follow up [Last Pap Date: ___] : Last Pap Date: [unfilled] [Delivery Date: ___] : on [unfilled] [Primary C/S] : delivered by  section [Complications:___] : no complications [Breastfeeding] : not currently nursing [Currently Experiencing] : The patient is currently experiencing symptoms. [None] : No associated symptoms are reported [Clean/Dry/Intact] : clean, dry and intact [FreeTextEntry1] : C/D/I C/O DYSURIA KEFLEX 500mg TID X 3 DAYS RTO 4wks/ PRN

## 2024-01-22 ENCOUNTER — APPOINTMENT (OUTPATIENT)
Dept: ANTEPARTUM | Facility: CLINIC | Age: 29
End: 2024-01-22

## 2024-01-29 ENCOUNTER — APPOINTMENT (OUTPATIENT)
Dept: ANTEPARTUM | Facility: CLINIC | Age: 29
End: 2024-01-29

## 2024-02-05 ENCOUNTER — APPOINTMENT (OUTPATIENT)
Dept: ANTEPARTUM | Facility: CLINIC | Age: 29
End: 2024-02-05

## 2024-02-12 ENCOUNTER — APPOINTMENT (OUTPATIENT)
Dept: ANTEPARTUM | Facility: CLINIC | Age: 29
End: 2024-02-12

## 2024-02-15 ENCOUNTER — APPOINTMENT (OUTPATIENT)
Dept: OBGYN | Facility: CLINIC | Age: 29
End: 2024-02-15
Payer: MEDICAID

## 2024-02-15 VITALS
BODY MASS INDEX: 29.7 KG/M2 | TEMPERATURE: 98.7 F | HEIGHT: 68 IN | HEART RATE: 108 BPM | SYSTOLIC BLOOD PRESSURE: 100 MMHG | DIASTOLIC BLOOD PRESSURE: 60 MMHG | OXYGEN SATURATION: 98 % | WEIGHT: 196 LBS

## 2024-02-15 DIAGNOSIS — Z98.891 HISTORY OF UTERINE SCAR FROM PREVIOUS SURGERY: ICD-10-CM

## 2024-02-15 DIAGNOSIS — Z13.1 ENCOUNTER FOR SCREENING FOR DIABETES MELLITUS: ICD-10-CM

## 2024-02-15 PROCEDURE — 0503F POSTPARTUM CARE VISIT: CPT

## 2024-02-15 NOTE — HISTORY OF PRESENT ILLNESS
[Postpartum Follow Up] : postpartum follow up [Last Pap Date: ___] : Last Pap Date: [unfilled] [Delivery Date: ___] : on [unfilled] [Primary C/S] : delivered by  section [Multiples: ___] : Delivery History: [unfilled] babies [Wt. ___] : weighing [unfilled] [Twins ___] : babies are [unfilled] [Infant's Name ___] : [unfilled] [Breastfeeding] : not currently nursing [Resumed Menses] : has not resumed her menses [de-identified] : TWIN A 2.4 LBS  TWIN B 2.2LBS [FreeTextEntry1] : BLEEDING RTO 4wks/ PRN

## 2024-02-20 ENCOUNTER — APPOINTMENT (OUTPATIENT)
Dept: ANTEPARTUM | Facility: CLINIC | Age: 29
End: 2024-02-20

## 2024-02-26 ENCOUNTER — APPOINTMENT (OUTPATIENT)
Dept: ANTEPARTUM | Facility: CLINIC | Age: 29
End: 2024-02-26

## 2024-03-04 ENCOUNTER — APPOINTMENT (OUTPATIENT)
Dept: ANTEPARTUM | Facility: CLINIC | Age: 29
End: 2024-03-04

## 2024-03-11 ENCOUNTER — APPOINTMENT (OUTPATIENT)
Dept: ANTEPARTUM | Facility: CLINIC | Age: 29
End: 2024-03-11

## 2024-03-14 ENCOUNTER — APPOINTMENT (OUTPATIENT)
Dept: OBGYN | Facility: CLINIC | Age: 29
End: 2024-03-14
Payer: MEDICAID

## 2024-03-14 VITALS
TEMPERATURE: 98 F | HEART RATE: 85 BPM | SYSTOLIC BLOOD PRESSURE: 100 MMHG | DIASTOLIC BLOOD PRESSURE: 60 MMHG | WEIGHT: 194 LBS | HEIGHT: 67 IN | OXYGEN SATURATION: 98 % | BODY MASS INDEX: 30.45 KG/M2

## 2024-03-14 DIAGNOSIS — Z30.09 ENCOUNTER FOR OTHER GENERAL COUNSELING AND ADVICE ON CONTRACEPTION: ICD-10-CM

## 2024-03-14 LAB
BILIRUB UR QL STRIP: NEGATIVE
CLARITY UR: NORMAL
COLLECTION METHOD: NORMAL
GLUCOSE UR-MCNC: NEGATIVE
HCG UR QL: 0.2 EU/DL
HGB UR QL STRIP.AUTO: NEGATIVE
KETONES UR-MCNC: NEGATIVE
LEUKOCYTE ESTERASE UR QL STRIP: NEGATIVE
NITRITE UR QL STRIP: NEGATIVE
PH UR STRIP: 7
PROT UR STRIP-MCNC: NEGATIVE
SP GR UR STRIP: 1.02

## 2024-03-14 PROCEDURE — 0503F POSTPARTUM CARE VISIT: CPT

## 2024-03-14 RX ORDER — NORETHINDRONE ACETATE AND ETHINYL ESTRADIOL AND FERROUS FUMARATE 1MG-20(21)
1-20 KIT ORAL DAILY
Qty: 1 | Refills: 2 | Status: ACTIVE | COMMUNITY
Start: 2024-03-14 | End: 1900-01-01

## 2024-03-16 LAB
C TRACH RRNA SPEC QL NAA+PROBE: NOT DETECTED
N GONORRHOEA RRNA SPEC QL NAA+PROBE: NOT DETECTED
SOURCE AMPLIFICATION: NORMAL
SOURCE AMPLIFICATION: NORMAL
T VAGINALIS RRNA SPEC QL NAA+PROBE: NOT DETECTED

## 2024-03-18 ENCOUNTER — APPOINTMENT (OUTPATIENT)
Dept: ANTEPARTUM | Facility: CLINIC | Age: 29
End: 2024-03-18

## 2024-03-18 LAB — HPV HIGH+LOW RISK DNA PNL CVX: NOT DETECTED

## 2024-03-19 LAB — CYTOLOGY CVX/VAG DOC THIN PREP: NORMAL

## 2024-03-25 ENCOUNTER — APPOINTMENT (OUTPATIENT)
Dept: ANTEPARTUM | Facility: CLINIC | Age: 29
End: 2024-03-25

## 2024-07-08 ENCOUNTER — APPOINTMENT (OUTPATIENT)
Dept: OBGYN | Facility: CLINIC | Age: 29
End: 2024-07-08
Payer: MEDICAID

## 2024-07-08 VITALS
OXYGEN SATURATION: 98 % | DIASTOLIC BLOOD PRESSURE: 60 MMHG | HEART RATE: 104 BPM | SYSTOLIC BLOOD PRESSURE: 98 MMHG | HEIGHT: 67 IN | TEMPERATURE: 98.2 F

## 2024-07-08 DIAGNOSIS — Z01.83 ENCOUNTER FOR BLOOD TYPING: ICD-10-CM

## 2024-07-08 DIAGNOSIS — N91.2 AMENORRHEA, UNSPECIFIED: ICD-10-CM

## 2024-07-08 LAB
BILIRUB UR QL STRIP: NORMAL
CLARITY UR: CLEAR
COLLECTION METHOD: NORMAL
GLUCOSE UR-MCNC: NORMAL
HCG UR QL: 0.2 EU/DL
HCG UR QL: POSITIVE
HGB UR QL STRIP.AUTO: NORMAL
KETONES UR-MCNC: NORMAL
LEUKOCYTE ESTERASE UR QL STRIP: NORMAL
NITRITE UR QL STRIP: NORMAL
PH UR STRIP: 5.5
PROT UR STRIP-MCNC: NORMAL
QUALITY CONTROL: YES
SP GR UR STRIP: 1.03

## 2024-07-08 PROCEDURE — 81003 URINALYSIS AUTO W/O SCOPE: CPT | Mod: QW

## 2024-07-08 PROCEDURE — 99213 OFFICE O/P EST LOW 20 MIN: CPT

## 2024-07-08 PROCEDURE — 81025 URINE PREGNANCY TEST: CPT

## 2024-07-10 LAB
ABO + RH PNL BLD: NORMAL
HCG SERPL-MCNC: 3750 MIU/ML
PROGEST SERPL-MCNC: 20.32 NG/ML

## 2024-07-15 ENCOUNTER — APPOINTMENT (OUTPATIENT)
Dept: OBGYN | Facility: CLINIC | Age: 29
End: 2024-07-15

## 2024-07-29 ENCOUNTER — APPOINTMENT (OUTPATIENT)
Dept: OBGYN | Facility: CLINIC | Age: 29
End: 2024-07-29

## 2024-08-15 NOTE — OB RN INTRAOPERATIVE NOTE - NS_SKINPREP_OBGYN_ALL_OB
Balta from [facility] pharmacy called states insurance will not pay for Methocarbamol until patient tries Tizanidine. Send in Tizanidine if in agreement   
Patient called asking if alternative can be signed by PCP.   
Chloraprep

## 2024-09-19 ENCOUNTER — APPOINTMENT (OUTPATIENT)
Dept: OBGYN | Facility: CLINIC | Age: 29
End: 2024-09-19
Payer: MEDICAID

## 2024-09-19 VITALS
TEMPERATURE: 98 F | SYSTOLIC BLOOD PRESSURE: 102 MMHG | HEIGHT: 67 IN | BODY MASS INDEX: 31.08 KG/M2 | WEIGHT: 198 LBS | DIASTOLIC BLOOD PRESSURE: 60 MMHG | HEART RATE: 88 BPM | OXYGEN SATURATION: 98 %

## 2024-09-19 DIAGNOSIS — Z01.818 ENCOUNTER FOR OTHER PREPROCEDURAL EXAMINATION: ICD-10-CM

## 2024-09-19 LAB
BILIRUB UR QL STRIP: NORMAL
GLUCOSE UR-MCNC: NORMAL
HGB UR QL STRIP.AUTO: NORMAL
KETONES UR-MCNC: NORMAL
LEUKOCYTE ESTERASE UR QL STRIP: NORMAL
NITRITE UR QL STRIP: NORMAL
PROT UR STRIP-MCNC: NORMAL

## 2024-09-19 PROCEDURE — 81003 URINALYSIS AUTO W/O SCOPE: CPT | Mod: QW

## 2024-09-19 PROCEDURE — 99212 OFFICE O/P EST SF 10 MIN: CPT

## 2024-09-19 NOTE — HISTORY OF PRESENT ILLNESS
[Patient reported PAP Smear was normal] : Patient reported PAP Smear was normal [Y] : Positive pregnancy history [TextBox_4] : 29 year old female pt present for tubal ligation consult.  [Mammogramdate] : 0 [PapSmeardate] : 3/14/2024 [BoneDensityDate] : 0 [ColonoscopyDate] : 0 [LMPDate] : 9/9/2024 [PGHxTotal] : 4 [[facility]] : 2 [PGHxAbortions] : 1 [[facility] Campusiving] : 3 [PGHxMultBirths] : 1

## 2024-09-19 NOTE — PLAN
[FreeTextEntry1] : PT STATES SHE NO LONGER WISHES TO CONCEIVE  TUBAL CONSENT SIGNED  VITAMINS QD C//D/E/ DIET DISCUSSED RTO POST PROCEDURE

## 2024-10-31 ENCOUNTER — OUTPATIENT (OUTPATIENT)
Dept: OUTPATIENT SERVICES | Facility: HOSPITAL | Age: 29
LOS: 1 days | End: 2024-10-31
Payer: MEDICAID

## 2024-10-31 VITALS
OXYGEN SATURATION: 98 % | DIASTOLIC BLOOD PRESSURE: 76 MMHG | TEMPERATURE: 97 F | HEART RATE: 78 BPM | RESPIRATION RATE: 16 BRPM | HEIGHT: 67 IN | SYSTOLIC BLOOD PRESSURE: 107 MMHG

## 2024-10-31 DIAGNOSIS — Z98.891 HISTORY OF UTERINE SCAR FROM PREVIOUS SURGERY: Chronic | ICD-10-CM

## 2024-10-31 DIAGNOSIS — Z98.890 OTHER SPECIFIED POSTPROCEDURAL STATES: Chronic | ICD-10-CM

## 2024-10-31 DIAGNOSIS — Z01.818 ENCOUNTER FOR OTHER PREPROCEDURAL EXAMINATION: ICD-10-CM

## 2024-10-31 DIAGNOSIS — Z98.51 TUBAL LIGATION STATUS: ICD-10-CM

## 2024-10-31 LAB
BASOPHILS # BLD AUTO: 0.04 K/UL — SIGNIFICANT CHANGE UP (ref 0–0.2)
BASOPHILS NFR BLD AUTO: 0.8 % — SIGNIFICANT CHANGE UP (ref 0–1)
EOSINOPHIL # BLD AUTO: 0.08 K/UL — SIGNIFICANT CHANGE UP (ref 0–0.7)
EOSINOPHIL NFR BLD AUTO: 1.5 % — SIGNIFICANT CHANGE UP (ref 0–8)
HCT VFR BLD CALC: 39.8 % — SIGNIFICANT CHANGE UP (ref 37–47)
HGB BLD-MCNC: 12.8 G/DL — SIGNIFICANT CHANGE UP (ref 12–16)
IMM GRANULOCYTES NFR BLD AUTO: 0.2 % — SIGNIFICANT CHANGE UP (ref 0.1–0.3)
LYMPHOCYTES # BLD AUTO: 1.65 K/UL — SIGNIFICANT CHANGE UP (ref 1.2–3.4)
LYMPHOCYTES # BLD AUTO: 31 % — SIGNIFICANT CHANGE UP (ref 20.5–51.1)
MCHC RBC-ENTMCNC: 25.7 PG — LOW (ref 27–31)
MCHC RBC-ENTMCNC: 32.2 G/DL — SIGNIFICANT CHANGE UP (ref 32–37)
MCV RBC AUTO: 79.9 FL — LOW (ref 81–99)
MONOCYTES # BLD AUTO: 0.42 K/UL — SIGNIFICANT CHANGE UP (ref 0.1–0.6)
MONOCYTES NFR BLD AUTO: 7.9 % — SIGNIFICANT CHANGE UP (ref 1.7–9.3)
NEUTROPHILS # BLD AUTO: 3.12 K/UL — SIGNIFICANT CHANGE UP (ref 1.4–6.5)
NEUTROPHILS NFR BLD AUTO: 58.6 % — SIGNIFICANT CHANGE UP (ref 42.2–75.2)
NRBC # BLD: 0 /100 WBCS — SIGNIFICANT CHANGE UP (ref 0–0)
PLATELET # BLD AUTO: 307 K/UL — SIGNIFICANT CHANGE UP (ref 130–400)
PMV BLD: 10.2 FL — SIGNIFICANT CHANGE UP (ref 7.4–10.4)
RBC # BLD: 4.98 M/UL — SIGNIFICANT CHANGE UP (ref 4.2–5.4)
RBC # FLD: 13.6 % — SIGNIFICANT CHANGE UP (ref 11.5–14.5)
WBC # BLD: 5.32 K/UL — SIGNIFICANT CHANGE UP (ref 4.8–10.8)
WBC # FLD AUTO: 5.32 K/UL — SIGNIFICANT CHANGE UP (ref 4.8–10.8)

## 2024-10-31 PROCEDURE — 36415 COLL VENOUS BLD VENIPUNCTURE: CPT

## 2024-10-31 PROCEDURE — 99214 OFFICE O/P EST MOD 30 MIN: CPT | Mod: 25

## 2024-10-31 PROCEDURE — 85025 COMPLETE CBC W/AUTO DIFF WBC: CPT

## 2024-10-31 NOTE — H&P PST ADULT - MUSCULOSKELETAL
normal/ROM intact/no joint warmth/normal gait/strength 5/5 bilateral upper extremities/strength 5/5 bilateral lower extremities

## 2024-10-31 NOTE — H&P PST ADULT - HISTORY OF PRESENT ILLNESS
28 Y/O FEMALE PT TO PAST WITH C/O DESIRE FOR NO MORE CHILDREN S/P C/SECT 1/24    PT NOW FOR SCHEDULED PROCEDURE ( LIGATION FALLOPIAN TUBES, DX LAP) . PT DENIES ANY CP SOB PALP COUGH DYSURIA FEVER URI.  Anesthesia Alert  NO--Difficult Airway  NO--History of neck surgery or radiation  NO--Limited ROM of neck  NO--History of Malignant hyperthermia  NO--Personal or family history of Pseudocholinesterase deficiency.  NO--Prior Anesthesia Complication  NO--Latex Allergy  NO--Loose teeth  NO--History of Rheumatoid Arthritis  NO--ELMER  NO--Bleeding risk  NO--Other_____   28 Y/O FEMALE PT TO PAST WITH C/O DESIRE FOR NO MORE CHILDREN S/P C/SECT ( TWINS)   1/24    PT NOW FOR SCHEDULED PROCEDURE ( LIGATION FALLOPIAN TUBES, DX LAP) . PT DENIES ANY CP SOB PALP COUGH DYSURIA FEVER URI.  Anesthesia Alert  CLASS III  NO--History of neck surgery or radiation  NO--Limited ROM of neck  NO--History of Malignant hyperthermia  NO--Personal or family history of Pseudocholinesterase deficiency.  NO--Prior Anesthesia Complication  NO--Latex Allergy  NO--Loose teeth  NO--History of Rheumatoid Arthritis  NO--ELMER  NO--Bleeding risk  NO--Other_____  RCRI 1  DASI 9 METS

## 2024-11-01 DIAGNOSIS — Z01.818 ENCOUNTER FOR OTHER PREPROCEDURAL EXAMINATION: ICD-10-CM

## 2024-11-01 DIAGNOSIS — Z98.51 TUBAL LIGATION STATUS: ICD-10-CM

## 2024-11-14 ENCOUNTER — OUTPATIENT (OUTPATIENT)
Dept: OUTPATIENT SERVICES | Facility: HOSPITAL | Age: 29
LOS: 1 days | Discharge: HOME | End: 2024-11-14
Payer: MEDICAID

## 2024-11-14 ENCOUNTER — TRANSCRIPTION ENCOUNTER (OUTPATIENT)
Age: 29
End: 2024-11-14

## 2024-11-14 VITALS
RESPIRATION RATE: 18 BRPM | SYSTOLIC BLOOD PRESSURE: 113 MMHG | TEMPERATURE: 98 F | DIASTOLIC BLOOD PRESSURE: 68 MMHG | HEART RATE: 78 BPM | OXYGEN SATURATION: 100 %

## 2024-11-14 VITALS
TEMPERATURE: 98 F | HEART RATE: 72 BPM | DIASTOLIC BLOOD PRESSURE: 71 MMHG | WEIGHT: 201.94 LBS | SYSTOLIC BLOOD PRESSURE: 112 MMHG | RESPIRATION RATE: 18 BRPM | OXYGEN SATURATION: 99 % | HEIGHT: 67 IN

## 2024-11-14 DIAGNOSIS — Z98.890 OTHER SPECIFIED POSTPROCEDURAL STATES: Chronic | ICD-10-CM

## 2024-11-14 DIAGNOSIS — Z98.51 TUBAL LIGATION STATUS: ICD-10-CM

## 2024-11-14 DIAGNOSIS — Z98.891 HISTORY OF UTERINE SCAR FROM PREVIOUS SURGERY: Chronic | ICD-10-CM

## 2024-11-14 PROCEDURE — 49320 DIAG LAPARO SEPARATE PROC: CPT | Mod: 59

## 2024-11-14 PROCEDURE — C9399: CPT

## 2024-11-14 PROCEDURE — 58600 DIVISION OF FALLOPIAN TUBE: CPT

## 2024-11-14 RX ORDER — ONDANSETRON HYDROCHLORIDE 2 MG/ML
4 INJECTION, SOLUTION INTRAMUSCULAR; INTRAVENOUS ONCE
Refills: 0 | Status: COMPLETED | OUTPATIENT
Start: 2024-11-14 | End: 2024-11-14

## 2024-11-14 RX ORDER — ACETAMINOPHEN 500 MG
975 TABLET ORAL ONCE
Refills: 0 | Status: DISCONTINUED | OUTPATIENT
Start: 2024-11-14 | End: 2024-11-14

## 2024-11-14 RX ORDER — HYDROMORPHONE HCL/0.9% NACL/PF 6 MG/30 ML
0.5 PATIENT CONTROLLED ANALGESIA SYRINGE INTRAVENOUS
Refills: 0 | Status: DISCONTINUED | OUTPATIENT
Start: 2024-11-14 | End: 2024-11-14

## 2024-11-14 RX ORDER — HYDROMORPHONE HCL/0.9% NACL/PF 6 MG/30 ML
1 PATIENT CONTROLLED ANALGESIA SYRINGE INTRAVENOUS
Refills: 0 | Status: DISCONTINUED | OUTPATIENT
Start: 2024-11-14 | End: 2024-11-14

## 2024-11-14 RX ADMIN — ONDANSETRON HYDROCHLORIDE 4 MILLIGRAM(S): 2 INJECTION, SOLUTION INTRAMUSCULAR; INTRAVENOUS at 11:44

## 2024-11-14 RX ADMIN — Medication 0.5 MILLIGRAM(S): at 11:44

## 2024-11-14 NOTE — CHART NOTE - NSCHARTNOTEFT_GEN_A_CORE
PACU ANESTHESIA ADMISSION NOTE      Procedure: Laparoscopic fulguration of oviducts      Post op diagnosis:  Request for sterilization        __x__  Patent Airway    __x__  Full return of protective reflexes    __x__  Full recovery from anesthesia / back to baseline status    Vitals:  T(C): 36.7 (11-14-24 @ 09:33), Max: 36.7 (11-14-24 @ 07:50)  HR: 72 (11-14-24 @ 09:33) (72 - 72)  BP: 112/71 (11-14-24 @ 09:33) (112/71 - 112/71)  RR: 18 (11-14-24 @ 09:33) (18 - 18)  SpO2: 99% (11-14-24 @ 09:33) (99% - 99%)    Mental Status:  __x__ Awake   ___x__ Alert   _____ Drowsy   _____ Sedated    Nausea/Vomiting:  __x__ NO  ______Yes,   See Post - Op Orders          Pain Scale (0-10):  ___2__    Treatment: ____ None    __x__ See Post - Op/PCA Orders    Post - Operative Fluids:   ____ Oral   __x__ See Post - Op Orders    Plan: Discharge:   __x__Home       _____Floor     _____Critical Care    _____  Other:_________________    Comments: Patient had smooth intraoperative event, no anesthesia complication.

## 2024-11-14 NOTE — BRIEF OPERATIVE NOTE - OPERATION/FINDINGS
Normal external genitalia, normal vagina and cervix. On laparoscopy, the uterus, ovaries, and fallopian tubes were normal in size, shape, and color. Left sided abdominal adhesions noted. Right sided inguinal hernia.

## 2024-11-14 NOTE — ASU DISCHARGE PLAN (ADULT/PEDIATRIC) - FINANCIAL ASSISTANCE
[facility] provides services at a reduced cost to those who are determined to be eligible through [facility]’s financial assistance program. Information regarding [facility]’s financial assistance program can be found by going to [email or web address] or by calling [phone].

## 2024-11-14 NOTE — ASU DISCHARGE PLAN (ADULT/PEDIATRIC) - CARE PROVIDER_API CALL
Amrik Dobbins  Obstetrics and Gynecology  [address] [place] [id number]  Phone: [phone]  Fax: [phone]  Follow Up Time:

## 2024-11-21 ENCOUNTER — APPOINTMENT (OUTPATIENT)
Dept: OBGYN | Facility: CLINIC | Age: 29
End: 2024-11-21
Payer: MEDICAID

## 2024-11-21 VITALS
SYSTOLIC BLOOD PRESSURE: 102 MMHG | DIASTOLIC BLOOD PRESSURE: 70 MMHG | TEMPERATURE: 98 F | HEIGHT: 67 IN | WEIGHT: 200 LBS | BODY MASS INDEX: 31.39 KG/M2 | HEART RATE: 84 BPM | OXYGEN SATURATION: 99 %

## 2024-11-21 DIAGNOSIS — Z98.51 TUBAL LIGATION STATUS: ICD-10-CM

## 2024-11-21 PROCEDURE — 99024 POSTOP FOLLOW-UP VISIT: CPT

## 2024-12-09 ENCOUNTER — APPOINTMENT (OUTPATIENT)
Dept: OBGYN | Facility: CLINIC | Age: 29
End: 2024-12-09
Payer: MEDICAID

## 2024-12-09 VITALS
OXYGEN SATURATION: 99 % | SYSTOLIC BLOOD PRESSURE: 102 MMHG | WEIGHT: 200 LBS | BODY MASS INDEX: 31.39 KG/M2 | DIASTOLIC BLOOD PRESSURE: 60 MMHG | HEART RATE: 69 BPM | HEIGHT: 67 IN | TEMPERATURE: 97.8 F

## 2024-12-09 DIAGNOSIS — Z98.51 TUBAL LIGATION STATUS: ICD-10-CM

## 2024-12-09 LAB
BILIRUB UR QL STRIP: NEGATIVE
CLARITY UR: NORMAL
COLLECTION METHOD: NORMAL
GLUCOSE UR-MCNC: NEGATIVE
HCG UR QL: 0.2 EU/DL
HGB UR QL STRIP.AUTO: NORMAL
KETONES UR-MCNC: NEGATIVE
LEUKOCYTE ESTERASE UR QL STRIP: NEGATIVE
NITRITE UR QL STRIP: NEGATIVE
PH UR STRIP: 6
PROT UR STRIP-MCNC: NEGATIVE
SP GR UR STRIP: >=1.03

## 2024-12-09 PROCEDURE — 99024 POSTOP FOLLOW-UP VISIT: CPT
